# Patient Record
Sex: MALE | Race: WHITE | NOT HISPANIC OR LATINO | Employment: OTHER | URBAN - METROPOLITAN AREA
[De-identification: names, ages, dates, MRNs, and addresses within clinical notes are randomized per-mention and may not be internally consistent; named-entity substitution may affect disease eponyms.]

---

## 2017-01-11 ENCOUNTER — AMBULATORY - HEALTHEAST (OUTPATIENT)
Dept: CARDIOLOGY | Facility: CLINIC | Age: 57
End: 2017-01-11

## 2017-01-11 ENCOUNTER — OFFICE VISIT - HEALTHEAST (OUTPATIENT)
Dept: CARDIOLOGY | Facility: CLINIC | Age: 57
End: 2017-01-11

## 2017-01-11 DIAGNOSIS — I10 ESSENTIAL HYPERTENSION WITH GOAL BLOOD PRESSURE LESS THAN 130/80: ICD-10-CM

## 2017-01-11 DIAGNOSIS — I51.7 LEFT VENTRICULAR HYPERTROPHY: ICD-10-CM

## 2017-01-11 DIAGNOSIS — I71.21 ASCENDING AORTIC ANEURYSM (H): ICD-10-CM

## 2017-01-11 DIAGNOSIS — I51.7 ASYMMETRIC SEPTAL HYPERTROPHY: ICD-10-CM

## 2017-01-11 ASSESSMENT — MIFFLIN-ST. JEOR: SCORE: 1453.1

## 2017-02-24 ENCOUNTER — COMMUNICATION - HEALTHEAST (OUTPATIENT)
Dept: CARDIOLOGY | Facility: CLINIC | Age: 57
End: 2017-02-24

## 2017-02-24 DIAGNOSIS — I25.10 CAD (CORONARY ARTERY DISEASE): ICD-10-CM

## 2018-01-09 ENCOUNTER — COMMUNICATION - HEALTHEAST (OUTPATIENT)
Dept: CARDIOLOGY | Facility: CLINIC | Age: 58
End: 2018-01-09

## 2018-01-09 DIAGNOSIS — I25.10 CAD (CORONARY ARTERY DISEASE): ICD-10-CM

## 2018-01-10 ENCOUNTER — COMMUNICATION - HEALTHEAST (OUTPATIENT)
Dept: ADMINISTRATIVE | Facility: CLINIC | Age: 58
End: 2018-01-10

## 2018-01-26 ENCOUNTER — COMMUNICATION - HEALTHEAST (OUTPATIENT)
Dept: CARDIOLOGY | Facility: CLINIC | Age: 58
End: 2018-01-26

## 2018-01-26 DIAGNOSIS — R07.9 CHEST PAIN: ICD-10-CM

## 2018-02-09 ENCOUNTER — COMMUNICATION - HEALTHEAST (OUTPATIENT)
Dept: TELEHEALTH | Facility: CLINIC | Age: 58
End: 2018-02-09

## 2018-02-09 ENCOUNTER — HOSPITAL ENCOUNTER (OUTPATIENT)
Dept: CARDIOLOGY | Facility: HOSPITAL | Age: 58
Discharge: HOME | End: 2018-02-09
Attending: INTERNAL MEDICINE

## 2018-02-09 DIAGNOSIS — R07.9 CHEST PAIN: ICD-10-CM

## 2018-02-24 ENCOUNTER — COMMUNICATION - HEALTHEAST (OUTPATIENT)
Dept: CARDIOLOGY | Facility: CLINIC | Age: 58
End: 2018-02-24

## 2018-02-24 DIAGNOSIS — I25.10 CAD (CORONARY ARTERY DISEASE): ICD-10-CM

## 2018-02-27 ENCOUNTER — COMMUNICATION - HEALTHEAST (OUTPATIENT)
Dept: CARDIOLOGY | Facility: CLINIC | Age: 58
End: 2018-02-27

## 2018-02-27 DIAGNOSIS — I25.10 CAD (CORONARY ARTERY DISEASE): ICD-10-CM

## 2018-03-31 ENCOUNTER — COMMUNICATION - HEALTHEAST (OUTPATIENT)
Dept: CARDIOLOGY | Facility: CLINIC | Age: 58
End: 2018-03-31

## 2018-03-31 DIAGNOSIS — I25.10 CAD (CORONARY ARTERY DISEASE): ICD-10-CM

## 2018-04-03 ENCOUNTER — RECORDS - HEALTHEAST (OUTPATIENT)
Dept: ADMINISTRATIVE | Facility: OTHER | Age: 58
End: 2018-04-03

## 2018-06-12 ENCOUNTER — RECORDS - HEALTHEAST (OUTPATIENT)
Dept: ADMINISTRATIVE | Facility: OTHER | Age: 58
End: 2018-06-12

## 2018-08-22 ENCOUNTER — AMBULATORY - HEALTHEAST (OUTPATIENT)
Dept: PULMONOLOGY | Facility: OTHER | Age: 58
End: 2018-08-22

## 2018-08-22 ENCOUNTER — COMMUNICATION - HEALTHEAST (OUTPATIENT)
Dept: PULMONOLOGY | Facility: OTHER | Age: 58
End: 2018-08-22

## 2018-08-22 DIAGNOSIS — R06.02 SOB (SHORTNESS OF BREATH): ICD-10-CM

## 2018-09-12 ENCOUNTER — HOSPITAL ENCOUNTER (OUTPATIENT)
Dept: RESPIRATORY THERAPY | Facility: HOSPITAL | Age: 58
Discharge: HOME | End: 2018-09-12

## 2018-09-12 DIAGNOSIS — R06.02 SOB (SHORTNESS OF BREATH): ICD-10-CM

## 2018-09-12 LAB — HGB BLD-MCNC: 14.3 G/DL (ref 14–18)

## 2018-09-17 ENCOUNTER — OFFICE VISIT - HEALTHEAST (OUTPATIENT)
Dept: PULMONOLOGY | Facility: OTHER | Age: 58
End: 2018-09-17

## 2018-09-17 DIAGNOSIS — R07.89 ATYPICAL CHEST PAIN: ICD-10-CM

## 2018-09-17 ASSESSMENT — MIFFLIN-ST. JEOR: SCORE: 1403.2

## 2018-09-28 ENCOUNTER — COMMUNICATION - HEALTHEAST (OUTPATIENT)
Dept: INTENSIVE CARE | Facility: CLINIC | Age: 58
End: 2018-09-28

## 2019-01-25 ENCOUNTER — AMBULATORY - HEALTHEAST (OUTPATIENT)
Dept: PULMONOLOGY | Facility: OTHER | Age: 59
End: 2019-01-25

## 2019-01-28 ENCOUNTER — AMBULATORY - HEALTHEAST (OUTPATIENT)
Dept: PULMONOLOGY | Facility: OTHER | Age: 59
End: 2019-01-28

## 2019-01-28 DIAGNOSIS — R09.1 PLEURISY: ICD-10-CM

## 2019-02-12 ENCOUNTER — HOSPITAL ENCOUNTER (OUTPATIENT)
Dept: CT IMAGING | Facility: HOSPITAL | Age: 59
Discharge: HOME | End: 2019-02-12
Attending: INTERNAL MEDICINE

## 2019-02-12 DIAGNOSIS — R09.1 PLEURISY: ICD-10-CM

## 2019-02-15 ENCOUNTER — COMMUNICATION - HEALTHEAST (OUTPATIENT)
Dept: PULMONOLOGY | Facility: OTHER | Age: 59
End: 2019-02-15

## 2019-02-18 ENCOUNTER — COMMUNICATION - HEALTHEAST (OUTPATIENT)
Dept: PULMONOLOGY | Facility: OTHER | Age: 59
End: 2019-02-18

## 2019-11-01 ENCOUNTER — RECORDS - HEALTHEAST (OUTPATIENT)
Dept: ADMINISTRATIVE | Facility: OTHER | Age: 59
End: 2019-11-01

## 2019-11-04 ENCOUNTER — OFFICE VISIT - HEALTHEAST (OUTPATIENT)
Dept: CARDIOLOGY | Facility: CLINIC | Age: 59
End: 2019-11-04

## 2019-11-04 DIAGNOSIS — I71.21 ASCENDING AORTIC ANEURYSM (H): ICD-10-CM

## 2019-11-04 DIAGNOSIS — R07.2 PRECORDIAL PAIN: ICD-10-CM

## 2019-11-04 ASSESSMENT — MIFFLIN-ST. JEOR: SCORE: 1416.81

## 2019-12-01 ENCOUNTER — APPOINTMENT (OUTPATIENT)
Dept: GENERAL RADIOLOGY | Facility: CLINIC | Age: 59
End: 2019-12-01
Attending: EMERGENCY MEDICINE
Payer: COMMERCIAL

## 2019-12-01 ENCOUNTER — HOSPITAL ENCOUNTER (EMERGENCY)
Facility: CLINIC | Age: 59
Discharge: HOME | End: 2019-12-01
Attending: EMERGENCY MEDICINE | Admitting: EMERGENCY MEDICINE
Payer: COMMERCIAL

## 2019-12-01 VITALS
SYSTOLIC BLOOD PRESSURE: 158 MMHG | RESPIRATION RATE: 16 BRPM | TEMPERATURE: 98.2 F | DIASTOLIC BLOOD PRESSURE: 93 MMHG | WEIGHT: 145 LBS | HEART RATE: 90 BPM | OXYGEN SATURATION: 98 %

## 2019-12-01 DIAGNOSIS — H10.31 ACUTE CONJUNCTIVITIS OF RIGHT EYE, UNSPECIFIED ACUTE CONJUNCTIVITIS TYPE: ICD-10-CM

## 2019-12-01 DIAGNOSIS — J18.9 COMMUNITY ACQUIRED PNEUMONIA, UNSPECIFIED LATERALITY: ICD-10-CM

## 2019-12-01 LAB
ALBUMIN SERPL-MCNC: 3.2 G/DL (ref 3.4–5)
ALP SERPL-CCNC: 130 U/L (ref 40–150)
ALT SERPL W P-5'-P-CCNC: 32 U/L (ref 0–70)
ANION GAP SERPL CALCULATED.3IONS-SCNC: 4 MMOL/L (ref 3–14)
AST SERPL W P-5'-P-CCNC: 24 U/L (ref 0–45)
BASOPHILS # BLD AUTO: 0.1 10E9/L (ref 0–0.2)
BASOPHILS NFR BLD AUTO: 0.4 %
BILIRUB SERPL-MCNC: 0.6 MG/DL (ref 0.2–1.3)
BUN SERPL-MCNC: 19 MG/DL (ref 7–30)
CALCIUM SERPL-MCNC: 9.3 MG/DL (ref 8.5–10.1)
CHLORIDE SERPL-SCNC: 99 MMOL/L (ref 94–109)
CO2 SERPL-SCNC: 29 MMOL/L (ref 20–32)
CREAT SERPL-MCNC: 1.15 MG/DL (ref 0.66–1.25)
DEPRECATED S PYO AG THROAT QL EIA: NORMAL
DIFFERENTIAL METHOD BLD: ABNORMAL
EOSINOPHIL # BLD AUTO: 0.1 10E9/L (ref 0–0.7)
EOSINOPHIL NFR BLD AUTO: 0.7 %
ERYTHROCYTE [DISTWIDTH] IN BLOOD BY AUTOMATED COUNT: 11.7 % (ref 10–15)
FLUAV+FLUBV AG SPEC QL: NEGATIVE
FLUAV+FLUBV AG SPEC QL: NEGATIVE
GFR SERPL CREATININE-BSD FRML MDRD: 69 ML/MIN/{1.73_M2}
GLUCOSE SERPL-MCNC: 88 MG/DL (ref 70–99)
HCT VFR BLD AUTO: 40.4 % (ref 40–53)
HGB BLD-MCNC: 13 G/DL (ref 13.3–17.7)
IMM GRANULOCYTES # BLD: 0 10E9/L (ref 0–0.4)
IMM GRANULOCYTES NFR BLD: 0.3 %
LYMPHOCYTES # BLD AUTO: 1.4 10E9/L (ref 0.8–5.3)
LYMPHOCYTES NFR BLD AUTO: 11.6 %
MCH RBC QN AUTO: 30 PG (ref 26.5–33)
MCHC RBC AUTO-ENTMCNC: 32.2 G/DL (ref 31.5–36.5)
MCV RBC AUTO: 93 FL (ref 78–100)
MONOCYTES # BLD AUTO: 1.3 10E9/L (ref 0–1.3)
MONOCYTES NFR BLD AUTO: 11.4 %
NEUTROPHILS # BLD AUTO: 8.9 10E9/L (ref 1.6–8.3)
NEUTROPHILS NFR BLD AUTO: 75.6 %
NRBC # BLD AUTO: 0 10*3/UL
NRBC BLD AUTO-RTO: 0 /100
NT-PROBNP SERPL-MCNC: 158 PG/ML (ref 0–900)
PLATELET # BLD AUTO: 323 10E9/L (ref 150–450)
POTASSIUM SERPL-SCNC: 3.8 MMOL/L (ref 3.4–5.3)
PROT SERPL-MCNC: 8 G/DL (ref 6.8–8.8)
RBC # BLD AUTO: 4.34 10E12/L (ref 4.4–5.9)
SODIUM SERPL-SCNC: 132 MMOL/L (ref 133–144)
SPECIMEN SOURCE: NORMAL
SPECIMEN SOURCE: NORMAL
WBC # BLD AUTO: 11.8 10E9/L (ref 4–11)

## 2019-12-01 PROCEDURE — 99284 EMERGENCY DEPT VISIT MOD MDM: CPT | Mod: Z6 | Performed by: EMERGENCY MEDICINE

## 2019-12-01 PROCEDURE — 80053 COMPREHEN METABOLIC PANEL: CPT | Performed by: EMERGENCY MEDICINE

## 2019-12-01 PROCEDURE — 83880 ASSAY OF NATRIURETIC PEPTIDE: CPT | Performed by: EMERGENCY MEDICINE

## 2019-12-01 PROCEDURE — 87081 CULTURE SCREEN ONLY: CPT | Performed by: EMERGENCY MEDICINE

## 2019-12-01 PROCEDURE — 85025 COMPLETE CBC W/AUTO DIFF WBC: CPT | Performed by: EMERGENCY MEDICINE

## 2019-12-01 PROCEDURE — 87804 INFLUENZA ASSAY W/OPTIC: CPT | Mod: 59 | Performed by: EMERGENCY MEDICINE

## 2019-12-01 PROCEDURE — 99284 EMERGENCY DEPT VISIT MOD MDM: CPT | Mod: 25 | Performed by: EMERGENCY MEDICINE

## 2019-12-01 PROCEDURE — 25800030 ZZH RX IP 258 OP 636: Performed by: EMERGENCY MEDICINE

## 2019-12-01 PROCEDURE — 71046 X-RAY EXAM CHEST 2 VIEWS: CPT

## 2019-12-01 PROCEDURE — 87880 STREP A ASSAY W/OPTIC: CPT | Performed by: EMERGENCY MEDICINE

## 2019-12-01 RX ORDER — LEVOFLOXACIN 500 MG/1
500 TABLET, FILM COATED ORAL DAILY
Qty: 10 TABLET | Refills: 0 | Status: SHIPPED | OUTPATIENT
Start: 2019-12-01 | End: 2019-12-11

## 2019-12-01 RX ORDER — GENTAMICIN SULFATE 3 MG/ML
1-2 SOLUTION/ DROPS OPHTHALMIC 4 TIMES DAILY PRN
Qty: 5 ML | Refills: 0 | Status: SHIPPED | OUTPATIENT
Start: 2019-12-01 | End: 2019-12-08

## 2019-12-01 RX ADMIN — SODIUM CHLORIDE 1000 ML: 9 INJECTION, SOLUTION INTRAVENOUS at 14:54

## 2019-12-01 NOTE — ED PROVIDER NOTES
History     Chief Complaint   Patient presents with     Cough     bodyaches, right side chest pain     Abdominal Pain     HPI  Kiko Booker is a 59 year old male who with several days of URI symptoms with nonproductive cough, nasal congestion and sore throat who has developed right eye redness, mattering and drainage, right-sided chest pain, fatigue and malaise.  No fever. No recent travel or known infectious exposures.  No hemoptysis, leg pain or leg swelling.  He does not wear contact lenses and he has no eye pain, photophobia or visual deficit.  No history of glaucoma or eye disease.  He does not receive influenza vaccinations.  He is also recently been constipated and has had intermittent mild abdominal cramping.  No nausea or vomiting.  No signs or symptoms of GI bleeding.  He started using OTC medications for constipation, but he has in the past for intermittent episodes of constipation.    Allergies:  No Known Allergies    Problem List:    Patient Active Problem List    Diagnosis Date Noted     Adjustment disorder with mixed anxiety and depressed mood 02/28/2014     Priority: Medium     Neck pain 04/24/2013     Priority: Medium     Headache 04/24/2013     Priority: Medium     Problem list name updated by automated process. Provider to review and confirm  Problem list name updated by automated process. Provider to review          Past Medical History:    No past medical history on file.    Past Surgical History:    No past surgical history on file.    Family History:    No family history on file.    Social History:  Marital Status:   [5]  Social History     Tobacco Use     Smoking status: No   Substance Use Topics     Alcohol use: Not on file     Drug use: No        Medications:    gentamicin (GARAMYCIN) 0.3 % ophthalmic solution  levofloxacin (LEVAQUIN) 500 MG tablet        Review of Systems  As mentioned above in the history present illness.  All other systems were reviewed and are  negative.    Physical Exam   BP: (!) 160/87  Pulse: 90  Heart Rate: 94  Temp: 98.2  F (36.8  C)  Resp: 16  Weight: 65.8 kg (145 lb)  SpO2: 97 %      Physical Exam  Vitals signs and nursing note reviewed.   Constitutional:       General: He is not in acute distress.     Appearance: Normal appearance. He is well-developed. He is not ill-appearing or diaphoretic.   HENT:      Head: Normocephalic and atraumatic.      Right Ear: External ear normal.      Left Ear: External ear normal.      Nose: Nose normal.      Mouth/Throat:      Mouth: Mucous membranes are moist.   Eyes:      General: No scleral icterus.     Extraocular Movements: Extraocular movements intact.      Conjunctiva/sclera: Conjunctivae normal.   Neck:      Musculoskeletal: Normal range of motion and neck supple.      Trachea: No tracheal deviation.   Cardiovascular:      Rate and Rhythm: Normal rate and regular rhythm.      Heart sounds: Normal heart sounds. No murmur. No friction rub. No gallop.    Pulmonary:      Effort: Pulmonary effort is normal. No tachypnea, accessory muscle usage, prolonged expiration or respiratory distress.      Breath sounds: No stridor or decreased air movement. Examination of the right-lower field reveals rhonchi and rales. Examination of the left-lower field reveals rhonchi and rales. Rhonchi and rales present. No wheezing.   Abdominal:      General: Bowel sounds are normal. There is no distension.      Palpations: Abdomen is soft.      Tenderness: There is no abdominal tenderness.   Musculoskeletal: Normal range of motion.         General: No tenderness.      Right lower leg: No edema.      Left lower leg: No edema.   Skin:     General: Skin is warm and dry.      Coloration: Skin is not pale.      Findings: No erythema or rash.   Neurological:      General: No focal deficit present.      Mental Status: He is alert and oriented to person, place, and time.      Coordination: Coordination normal.   Psychiatric:         Mood and  Affect: Mood normal.         Behavior: Behavior normal.         ED Course        Procedures              Results for orders placed or performed during the hospital encounter of 12/01/19   CBC with platelets differential     Status: Abnormal   Result Value Ref Range    WBC 11.8 (H) 4.0 - 11.0 10e9/L    RBC Count 4.34 (L) 4.4 - 5.9 10e12/L    Hemoglobin 13.0 (L) 13.3 - 17.7 g/dL    Hematocrit 40.4 40.0 - 53.0 %    MCV 93 78 - 100 fl    MCH 30.0 26.5 - 33.0 pg    MCHC 32.2 31.5 - 36.5 g/dL    RDW 11.7 10.0 - 15.0 %    Platelet Count 323 150 - 450 10e9/L    Diff Method Automated Method     % Neutrophils 75.6 %    % Lymphocytes 11.6 %    % Monocytes 11.4 %    % Eosinophils 0.7 %    % Basophils 0.4 %    % Immature Granulocytes 0.3 %    Nucleated RBCs 0 0 /100    Absolute Neutrophil 8.9 (H) 1.6 - 8.3 10e9/L    Absolute Lymphocytes 1.4 0.8 - 5.3 10e9/L    Absolute Monocytes 1.3 0.0 - 1.3 10e9/L    Absolute Eosinophils 0.1 0.0 - 0.7 10e9/L    Absolute Basophils 0.1 0.0 - 0.2 10e9/L    Abs Immature Granulocytes 0.0 0 - 0.4 10e9/L    Absolute Nucleated RBC 0.0    Comprehensive metabolic panel     Status: Abnormal   Result Value Ref Range    Sodium 132 (L) 133 - 144 mmol/L    Potassium 3.8 3.4 - 5.3 mmol/L    Chloride 99 94 - 109 mmol/L    Carbon Dioxide 29 20 - 32 mmol/L    Anion Gap 4 3 - 14 mmol/L    Glucose 88 70 - 99 mg/dL    Urea Nitrogen 19 7 - 30 mg/dL    Creatinine 1.15 0.66 - 1.25 mg/dL    GFR Estimate 69 >60 mL/min/[1.73_m2]    GFR Estimate If Black 80 >60 mL/min/[1.73_m2]    Calcium 9.3 8.5 - 10.1 mg/dL    Bilirubin Total 0.6 0.2 - 1.3 mg/dL    Albumin 3.2 (L) 3.4 - 5.0 g/dL    Protein Total 8.0 6.8 - 8.8 g/dL    Alkaline Phosphatase 130 40 - 150 U/L    ALT 32 0 - 70 U/L    AST 24 0 - 45 U/L   Nt probnp inpatient     Status: None   Result Value Ref Range    N-Terminal Pro BNP Inpatient 158 0 - 900 pg/mL   Rapid strep screen     Status: None   Result Value Ref Range    Specimen Description Throat     Rapid Strep A  Screen       NEGATIVE: No Group A streptococcal antigen detected by immunoassay, await culture report.   Influenza A/B antigen     Status: None   Result Value Ref Range    Influenza A/B Agn Specimen Nasopharyngeal     Influenza A Negative NEG^Negative    Influenza B Negative NEG^Negative   Beta strep group A culture     Status: None   Result Value Ref Range    Specimen Description Throat     Special Requests Specimen collected in eSwab transport (white cap)     Culture Micro No Beta Streptococcus isolated        CHEST TWO VIEW   12/1/2019 3:11 PM      HISTORY: Cough and dyspnea.     COMPARISON: None.                                                                IMPRESSION: Interstitial opacities seen in the bilateral lower lungs,  likely representing subsegmental atelectasis. Atypical infection would  be less likely but remains in the differential diagnosis, correlate  with clinical symptoms. Tortuous descending thoracic aorta. Enlarged  cardiomediastinal silhouette. Multilevel degenerative changes in the spine.            I independently reviewed the X-rays: Agree with the Radiologist's interpretation, bibasilar infiltrates.    Medications   0.9% sodium chloride BOLUS (0 mLs Intravenous Stopped 12/1/19 5760)       Assessments & Plan (with Medical Decision Making)   59-year-old male with uncomplicated community-acquired pneumonia without respiratory distress or hypoxia.  He appears stable for discharge home with Rx Levaquin for 10 days and gentamicin ophthalmic drops for right conjunctivitis.  He was provided instructions for supportive care and will return as needed for worsened condition or worsening symptoms, or new problems or concerns.  Recommended recheck in his primary care clinic in the near future and provided him a copy of his chest x-ray on disc to facilitate his outpatient follow-up.      I have reviewed the nursing notes.    I have reviewed the findings, diagnosis, plan and need for follow up with the  patient.    Discharge Medication List as of 12/1/2019  3:44 PM      START taking these medications    Details   gentamicin (GARAMYCIN) 0.3 % ophthalmic solution Place 1-2 drops into the right eye 4 times daily as needed (for 5-7 days as needed until symptoms resolve), Disp-5 mL, R-0, E-PrescribePlease dispense 7-day supply      levofloxacin (LEVAQUIN) 500 MG tablet Take 1 tablet (500 mg) by mouth daily for 10 days, Disp-10 tablet, R-0, E-Prescribe             Final diagnoses:   Community acquired pneumonia, unspecified laterality   Acute conjunctivitis of right eye, unspecified acute conjunctivitis type       12/1/2019   [facility] EMERGENCY DEPARTMENT     Jaciel Keene MD  12/05/19 5595

## 2019-12-01 NOTE — ED NOTES
IV discontinued, Pt d/c instructions reviewed and received. There are no unanswered questions at the time of discharge. Pt escorted to lobby for discharge.

## 2019-12-01 NOTE — ED NOTES
"Pt comes in with wife for urgent care. Started to not feel Weds. Developed sore throat on Thursday, had ear pain at one point, but not currently. Today has \" goopy\" right eye. Denies vision changes. Also notes abd discomfort but does not recall last BM and states \" could be due to that\" Has had cough, nonproductive, but worse with talking. Did not get influenza vaccine. Pt in room, oriented to room and call light. Call light within reach.  "

## 2019-12-01 NOTE — ED AVS SNAPSHOT
[facility] Emergency Department  [address] [address]  Phone:  [phone]  Fax:  [phone]                                    Kiko Booker   MRN: [id number]    Department:  [facility] Emergency Department   Date of Visit:  12/1/2019           After Visit Summary Signature Page    I have received my discharge instructions, and my questions have been answered. I have discussed any challenges I see with this plan with the nurse or doctor.    ..........................................................................................................................................  Patient/Patient Representative Signature      ..........................................................................................................................................  Patient Representative Print Name and Relationship to Patient    ..................................................               ................................................  Date                                   Time    ..........................................................................................................................................  Reviewed by Signature/Title    ...................................................              ..............................................  Date                                               Time          22EPIC Rev 08/18

## 2019-12-03 LAB
BACTERIA SPEC CULT: NORMAL
Lab: NORMAL
SPECIMEN SOURCE: NORMAL

## 2019-12-06 ENCOUNTER — RECORDS - HEALTHEAST (OUTPATIENT)
Dept: ADMINISTRATIVE | Facility: OTHER | Age: 59
End: 2019-12-06

## 2019-12-28 ENCOUNTER — COMMUNICATION - HEALTHEAST (OUTPATIENT)
Dept: FAMILY MEDICINE | Facility: CLINIC | Age: 59
End: 2019-12-28

## 2019-12-30 ENCOUNTER — COMMUNICATION - HEALTHEAST (OUTPATIENT)
Dept: CARDIOLOGY | Facility: CLINIC | Age: 59
End: 2019-12-30

## 2020-01-08 ENCOUNTER — HOSPITAL ENCOUNTER (OUTPATIENT)
Dept: CARDIOLOGY | Facility: HOSPITAL | Age: 60
Discharge: HOME | End: 2020-01-08

## 2020-01-08 DIAGNOSIS — I51.7 ENLARGED CARDIAC VENTRICLE: ICD-10-CM

## 2020-01-08 LAB
AORTIC ROOT: 4 CM
AORTIC VALVE MEAN VELOCITY: 132 CM/S
AV CUSP SEPERATION: 1.9 CM
AV CUSP SEPERATION: 1.9 CM
AV DIMENSIONLESS INDEX VTI: 0.8
AV MEAN GRADIENT: 7 MMHG
AV PEAK GRADIENT: 10.4 MMHG
AV VALVE AREA: 3.2 CM2
BSA FOR ECHO PROCEDURE: 1.77 M2
CV ECHO HEIGHT: 65 IN
CV ECHO WEIGHT: 151 LBS
DOP CALC AO PEAK VEL: 161 CM/S
DOP CALC AO VTI: 35.8 CM
DOP CALC LVOT AREA: 4.15 CM2
DOP CALC LVOT DIAMETER: 2.3 CM
DOP CALC LVOT STROKE VOLUME: 114.2 CM3
DOP CALC MV VTI: 21.4 CM
DOP CALCLVOT PEAK VEL VTI: 27.5 CM
EJECTION FRACTION: 73 % (ref 55–75)
FRACTIONAL SHORTENING: 25 % (ref 28–44)
INTERVENTRICULAR SEPTUM IN END DIASTOLE: 1.4 CM (ref 0.6–1)
IVS/PW RATIO: 1.2
LA AREA 1: 18.4 CM2
LA AREA 2: 21.7 CM2
LEFT ATRIUM LENGTH: 4.46 CM
LEFT ATRIUM SIZE: 3.2 CM
LEFT ATRIUM VOLUME INDEX: 43 ML/M2
LEFT ATRIUM VOLUME: 76.1 ML
LEFT VENTRICLE CARDIAC INDEX: 5.3 L/MIN/M2
LEFT VENTRICLE CARDIAC OUTPUT: 9.4 L/MIN
LEFT VENTRICLE DIASTOLIC VOLUME INDEX: 35 CM3/M2 (ref 34–74)
LEFT VENTRICLE DIASTOLIC VOLUME: 62 CM3 (ref 62–150)
LEFT VENTRICLE HEART RATE: 82 BPM
LEFT VENTRICLE MASS INDEX: 90.4 G/M2
LEFT VENTRICLE SYSTOLIC VOLUME INDEX: 9.6 CM3/M2 (ref 11–31)
LEFT VENTRICLE SYSTOLIC VOLUME: 17 CM3 (ref 21–61)
LEFT VENTRICULAR INTERNAL DIMENSION IN DIASTOLE: 3.6 CM (ref 4.2–5.8)
LEFT VENTRICULAR INTERNAL DIMENSION IN SYSTOLE: 2.7 CM (ref 2.5–4)
LEFT VENTRICULAR MASS: 160.1 G
LEFT VENTRICULAR OUTFLOW TRACT MEAN GRADIENT: 5 MMHG
LEFT VENTRICULAR OUTFLOW TRACT MEAN VELOCITY: 109 CM/S
LEFT VENTRICULAR POSTERIOR WALL IN END DIASTOLE: 1.2 CM (ref 0.6–1)
LV STROKE VOLUME INDEX: 64.5 ML/M2
MITRAL VALVE DECELERATION SLOPE: 6980 MM/S2
MITRAL VALVE E/A RATIO: 0.8
MITRAL VALVE MEAN INFLOW VELOCITY: 56.1 CM/S
MITRAL VALVE PEAK VELOCITY: 102 CM/S
MITRAL VALVE PRESSURE HALF-TIME: 43 MS
MV AREA VTI: 5.34 CM2
MV AVERAGE E/E' RATIO: 9.9 CM/S
MV DECELERATION TIME: 169 MS
MV E'TISSUE VEL-LAT: 7.8 CM/S
MV E'TISSUE VEL-MED: 6.34 CM/S
MV LATERAL E/E' RATIO: 9
MV MEAN GRADIENT: 2 MMHG
MV MEDIAL E/E' RATIO: 11.1
MV PEAK A VELOCITY: 93.3 CM/S
MV PEAK E VELOCITY: 70.1 CM/S
MV PEAK GRADIENT: 4.2 MMHG
MV VALVE AREA BY CONTINUITY EQUATION: 5.3 CM2
MV VALVE AREA PRESSURE 1/2 METHOD: 5.1 CM2
NUC REST DIASTOLIC VOLUME INDEX: 2416 LBS
NUC REST SYSTOLIC VOLUME INDEX: 65 IN
TRICUSPID VALVE ANULAR PLANE SYSTOLIC EXCURSION: 2.9 CM

## 2020-01-08 ASSESSMENT — MIFFLIN-ST. JEOR: SCORE: 1416.81

## 2020-01-27 ENCOUNTER — COMMUNICATION - HEALTHEAST (OUTPATIENT)
Dept: CARDIOLOGY | Facility: CLINIC | Age: 60
End: 2020-01-27

## 2020-02-03 ENCOUNTER — RECORDS - HEALTHEAST (OUTPATIENT)
Dept: ADMINISTRATIVE | Facility: OTHER | Age: 60
End: 2020-02-03

## 2020-02-03 ENCOUNTER — AMBULATORY - HEALTHEAST (OUTPATIENT)
Dept: CARDIOLOGY | Facility: CLINIC | Age: 60
End: 2020-02-03

## 2020-02-10 ENCOUNTER — RECORDS - HEALTHEAST (OUTPATIENT)
Dept: RADIOLOGY | Facility: CLINIC | Age: 60
End: 2020-02-10

## 2020-02-11 ENCOUNTER — OFFICE VISIT - HEALTHEAST (OUTPATIENT)
Dept: CARDIOLOGY | Facility: CLINIC | Age: 60
End: 2020-02-11

## 2020-02-11 DIAGNOSIS — R07.9 NONSPECIFIC CHEST PAIN: ICD-10-CM

## 2020-02-11 DIAGNOSIS — I71.21 ASCENDING AORTIC ANEURYSM (H): ICD-10-CM

## 2020-02-11 LAB
ATRIAL RATE - MUSE: 72 BPM
DIASTOLIC BLOOD PRESSURE - MUSE: NORMAL
INTERPRETATION ECG - MUSE: NORMAL
P AXIS - MUSE: 35 DEGREES
PR INTERVAL - MUSE: 162 MS
QRS DURATION - MUSE: 86 MS
QT - MUSE: 370 MS
QTC - MUSE: 405 MS
R AXIS - MUSE: -19 DEGREES
SYSTOLIC BLOOD PRESSURE - MUSE: NORMAL
T AXIS - MUSE: 44 DEGREES
VENTRICULAR RATE- MUSE: 72 BPM

## 2020-02-11 ASSESSMENT — MIFFLIN-ST. JEOR: SCORE: 1421.35

## 2020-02-12 ENCOUNTER — AMBULATORY - HEALTHEAST (OUTPATIENT)
Dept: CARDIOLOGY | Facility: CLINIC | Age: 60
End: 2020-02-12

## 2020-02-12 ENCOUNTER — HOSPITAL ENCOUNTER (OUTPATIENT)
Dept: CARDIOLOGY | Facility: HOSPITAL | Age: 60
Discharge: HOME | End: 2020-02-12
Attending: INTERNAL MEDICINE

## 2020-02-12 DIAGNOSIS — I10 ESSENTIAL HYPERTENSION: ICD-10-CM

## 2020-02-12 LAB
CV STRESS CURRENT BP HE: NORMAL
CV STRESS CURRENT HR HE: 102
CV STRESS CURRENT HR HE: 103
CV STRESS CURRENT HR HE: 104
CV STRESS CURRENT HR HE: 111
CV STRESS CURRENT HR HE: 113
CV STRESS CURRENT HR HE: 113
CV STRESS CURRENT HR HE: 115
CV STRESS CURRENT HR HE: 117
CV STRESS CURRENT HR HE: 118
CV STRESS CURRENT HR HE: 122
CV STRESS CURRENT HR HE: 126
CV STRESS CURRENT HR HE: 128
CV STRESS CURRENT HR HE: 129
CV STRESS CURRENT HR HE: 132
CV STRESS CURRENT HR HE: 133
CV STRESS CURRENT HR HE: 135
CV STRESS CURRENT HR HE: 137
CV STRESS CURRENT HR HE: 139
CV STRESS CURRENT HR HE: 140
CV STRESS CURRENT HR HE: 141
CV STRESS CURRENT HR HE: 143
CV STRESS CURRENT HR HE: 144
CV STRESS CURRENT HR HE: 144
CV STRESS CURRENT HR HE: 145
CV STRESS CURRENT HR HE: 84
CV STRESS CURRENT HR HE: 86
CV STRESS CURRENT HR HE: 90
CV STRESS CURRENT HR HE: 90
CV STRESS CURRENT HR HE: 91
CV STRESS CURRENT HR HE: 91
CV STRESS CURRENT HR HE: 95
CV STRESS CURRENT HR HE: 96
CV STRESS CURRENT HR HE: 98
CV STRESS DEVIATION TIME HE: NORMAL
CV STRESS ECHO PERCENT HR HE: NORMAL
CV STRESS EXERCISE STAGE HE: NORMAL
CV STRESS FINAL RESTING BP HE: NORMAL
CV STRESS FINAL RESTING HR HE: 91
CV STRESS MAX HR HE: 145
CV STRESS MAX TREADMILL GRADE HE: 14
CV STRESS MAX TREADMILL SPEED HE: 3.4
CV STRESS PEAK DIA BP HE: NORMAL
CV STRESS PEAK SYS BP HE: NORMAL
CV STRESS PHASE HE: NORMAL
CV STRESS PROTOCOL HE: NORMAL
CV STRESS RESTING PT POSITION HE: NORMAL
CV STRESS RESTING PT POSITION HE: NORMAL
CV STRESS ST DEVIATION AMOUNT HE: NORMAL
CV STRESS ST DEVIATION ELEVATION HE: NORMAL
CV STRESS ST EVELATION AMOUNT HE: NORMAL
CV STRESS TEST TYPE HE: NORMAL
CV STRESS TOTAL STAGE TIME MIN 1 HE: NORMAL
ECHO EJECTION FRACTION ESTIMATED: 65 %
RATE PRESSURE PRODUCT: NORMAL
STRESS ECHO BASELINE DIASTOLIC HE: 94
STRESS ECHO BASELINE HR: 86
STRESS ECHO BASELINE SYSTOLIC BP: 172
STRESS ECHO CALCULATED PERCENT HR: 90 %
STRESS ECHO LAST STRESS DIASTOLIC BP: 88
STRESS ECHO LAST STRESS HR: 144
STRESS ECHO LAST STRESS SYSTOLIC BP: 214
STRESS ECHO POST ESTIMATED WORKLOAD: 10.3
STRESS ECHO POST EXERCISE DUR MIN: 8
STRESS ECHO POST EXERCISE DUR SEC: 58
STRESS ECHO TARGET HR: 161

## 2020-02-17 ENCOUNTER — OFFICE VISIT - HEALTHEAST (OUTPATIENT)
Dept: PULMONOLOGY | Facility: OTHER | Age: 60
End: 2020-02-17

## 2020-02-17 ENCOUNTER — RECORDS - HEALTHEAST (OUTPATIENT)
Dept: RADIOLOGY | Facility: CLINIC | Age: 60
End: 2020-02-17

## 2020-02-17 DIAGNOSIS — J18.9 COMMUNITY ACQUIRED PNEUMONIA, UNSPECIFIED LATERALITY: ICD-10-CM

## 2020-02-17 DIAGNOSIS — R93.89 ABNORMAL CT SCAN: ICD-10-CM

## 2020-02-19 ENCOUNTER — COMMUNICATION - HEALTHEAST (OUTPATIENT)
Dept: PULMONOLOGY | Facility: OTHER | Age: 60
End: 2020-02-19

## 2020-07-02 ENCOUNTER — RECORDS - HEALTHEAST (OUTPATIENT)
Dept: ADMINISTRATIVE | Facility: OTHER | Age: 60
End: 2020-07-02

## 2020-07-10 ENCOUNTER — RECORDS - HEALTHEAST (OUTPATIENT)
Dept: LAB | Facility: HOSPITAL | Age: 60
End: 2020-07-10

## 2020-07-10 ENCOUNTER — HOSPITAL ENCOUNTER (OUTPATIENT)
Dept: ULTRASOUND IMAGING | Facility: HOSPITAL | Age: 60
Discharge: HOME | End: 2020-07-10
Attending: INTERNAL MEDICINE

## 2020-07-10 DIAGNOSIS — I10 ESSENTIAL HYPERTENSION: ICD-10-CM

## 2020-07-10 DIAGNOSIS — N18.30 CHRONIC KIDNEY DISEASE, STAGE 3 (MODERATE): ICD-10-CM

## 2020-07-10 DIAGNOSIS — N17.8 OTHER ACUTE KIDNEY FAILURE (H): ICD-10-CM

## 2020-07-10 DIAGNOSIS — I71.21 ANEURYSM OF ASCENDING AORTA (H): ICD-10-CM

## 2020-07-10 DIAGNOSIS — R79.89 SERUM CREATININE RAISED: ICD-10-CM

## 2020-07-10 LAB
25(OH)D3 SERPL-MCNC: 67.4 NG/ML (ref 30–80)
ALBUMIN SERPL-MCNC: 4.3 G/DL (ref 3.5–5)
ALBUMIN SERPL-MCNC: 4.3 G/DL (ref 3.5–5)
ALBUMIN UR-MCNC: NEGATIVE MG/DL
ANION GAP SERPL CALCULATED.3IONS-SCNC: 10 MMOL/L (ref 5–18)
APPEARANCE UR: CLEAR
BILIRUB UR QL STRIP: NEGATIVE
BUN SERPL-MCNC: 24 MG/DL (ref 8–22)
CALCIUM SERPL-MCNC: 9.3 MG/DL (ref 8.5–10.5)
CHLORIDE BLD-SCNC: 103 MMOL/L (ref 98–107)
CO2 SERPL-SCNC: 26 MMOL/L (ref 22–31)
COLOR UR AUTO: COLORLESS
CREAT SERPL-MCNC: 1.67 MG/DL (ref 0.7–1.3)
CREAT UR-MCNC: 15.4 MG/DL
CREAT UR-MCNC: 16.3 MG/DL
GFR SERPL CREATININE-BSD FRML MDRD: 42 ML/MIN/1.73M2
GLUCOSE BLD-MCNC: 94 MG/DL (ref 70–125)
GLUCOSE UR STRIP-MCNC: NEGATIVE MG/DL
HGB BLD-MCNC: 13.5 G/DL (ref 14–18)
HGB UR QL STRIP: NEGATIVE
KETONES UR STRIP-MCNC: NEGATIVE MG/DL
LEUKOCYTE ESTERASE UR QL STRIP: NEGATIVE
MICROALBUMIN UR-MCNC: <0.5 MG/DL (ref 0–1.99)
MICROALBUMIN/CREAT UR: NORMAL MG/G{CREAT}
NITRATE UR QL: NEGATIVE
PH UR STRIP: 6 [PH] (ref 4.5–8)
PHOSPHATE SERPL-MCNC: 3.3 MG/DL (ref 2.5–4.5)
POTASSIUM BLD-SCNC: 4.5 MMOL/L (ref 3.5–5)
PROTEIN, RANDOM URINE - HISTORICAL: <7 MG/DL
PROTEIN/CREAT RATIO, RANDOM UR: NORMAL
PTH-INTACT SERPL-MCNC: 136 PG/ML (ref 10–86)
SODIUM SERPL-SCNC: 139 MMOL/L (ref 136–145)
SP GR UR STRIP: 1 (ref 1–1.03)
UROBILINOGEN UR STRIP-ACNC: NORMAL

## 2021-04-18 ENCOUNTER — HEALTH MAINTENANCE LETTER (OUTPATIENT)
Age: 61
End: 2021-04-18

## 2021-04-23 ENCOUNTER — IMMUNIZATION (OUTPATIENT)
Dept: FAMILY MEDICINE | Facility: CLINIC | Age: 61
End: 2021-04-23
Payer: COMMERCIAL

## 2021-04-23 PROCEDURE — 91301 PR COVID VAC MODERNA 100 MCG/0.5 ML IM: CPT

## 2021-04-23 PROCEDURE — 0011A PR COVID VAC MODERNA 100 MCG/0.5 ML IM: CPT

## 2021-05-14 ENCOUNTER — COMMUNICATION - HEALTHEAST (OUTPATIENT)
Dept: CARDIOLOGY | Facility: CLINIC | Age: 61
End: 2021-05-14

## 2021-05-18 ENCOUNTER — RECORDS - HEALTHEAST (OUTPATIENT)
Dept: ADMINISTRATIVE | Facility: OTHER | Age: 61
End: 2021-05-18

## 2021-05-21 ENCOUNTER — IMMUNIZATION (OUTPATIENT)
Dept: FAMILY MEDICINE | Facility: CLINIC | Age: 61
End: 2021-05-21
Attending: FAMILY MEDICINE
Payer: COMMERCIAL

## 2021-05-21 PROCEDURE — 0012A PR COVID VAC MODERNA 100 MCG/0.5 ML IM: CPT

## 2021-05-21 PROCEDURE — 91301 PR COVID VAC MODERNA 100 MCG/0.5 ML IM: CPT

## 2021-05-28 ENCOUNTER — RECORDS - HEALTHEAST (OUTPATIENT)
Dept: CARDIOLOGY | Facility: CLINIC | Age: 61
End: 2021-05-28

## 2021-05-28 ENCOUNTER — RECORDS - HEALTHEAST (OUTPATIENT)
Dept: ADMINISTRATIVE | Facility: OTHER | Age: 61
End: 2021-05-28

## 2021-05-30 VITALS — BODY MASS INDEX: 26.49 KG/M2 | HEIGHT: 65 IN | WEIGHT: 159 LBS

## 2021-06-01 ENCOUNTER — RECORDS - HEALTHEAST (OUTPATIENT)
Dept: ADMINISTRATIVE | Facility: CLINIC | Age: 61
End: 2021-06-01

## 2021-06-01 VITALS — HEIGHT: 65 IN | WEIGHT: 148 LBS | BODY MASS INDEX: 24.66 KG/M2

## 2021-06-02 ENCOUNTER — OFFICE VISIT - HEALTHEAST (OUTPATIENT)
Dept: CARDIOLOGY | Facility: CLINIC | Age: 61
End: 2021-06-02

## 2021-06-02 DIAGNOSIS — I42.2 HYPERTROPHIC CARDIOMYOPATHY (H): ICD-10-CM

## 2021-06-02 DIAGNOSIS — E78.5 DYSLIPIDEMIA, GOAL LDL BELOW 100: ICD-10-CM

## 2021-06-02 DIAGNOSIS — R07.9 EXERTIONAL CHEST PAIN: ICD-10-CM

## 2021-06-02 DIAGNOSIS — I10 ESSENTIAL HYPERTENSION: ICD-10-CM

## 2021-06-02 DIAGNOSIS — I71.21 ASCENDING AORTIC ANEURYSM (H): ICD-10-CM

## 2021-06-02 ASSESSMENT — MIFFLIN-ST. JEOR: SCORE: 1439.49

## 2021-06-03 VITALS — WEIGHT: 151 LBS | HEIGHT: 65 IN | BODY MASS INDEX: 25.16 KG/M2

## 2021-06-03 VITALS
RESPIRATION RATE: 16 BRPM | HEIGHT: 65 IN | HEART RATE: 80 BPM | SYSTOLIC BLOOD PRESSURE: 146 MMHG | BODY MASS INDEX: 25.16 KG/M2 | DIASTOLIC BLOOD PRESSURE: 82 MMHG | WEIGHT: 151 LBS

## 2021-06-03 NOTE — PATIENT INSTRUCTIONS - HE
Kiko,    It was a pleasure to see you today at [facility].    Please take isordil dinitrate 5 mg by mouth an hour or more before you exercise.     My nurse or I will call you with the stress test results; please don't take the isordil that day.    Please call us if you have any questions or concerns about your heart.      Uriah Dinh M.D.

## 2021-06-04 VITALS
DIASTOLIC BLOOD PRESSURE: 92 MMHG | HEART RATE: 88 BPM | HEIGHT: 65 IN | SYSTOLIC BLOOD PRESSURE: 148 MMHG | RESPIRATION RATE: 16 BRPM | WEIGHT: 152 LBS | BODY MASS INDEX: 25.33 KG/M2

## 2021-06-04 VITALS
RESPIRATION RATE: 20 BRPM | BODY MASS INDEX: 25.94 KG/M2 | OXYGEN SATURATION: 99 % | DIASTOLIC BLOOD PRESSURE: 84 MMHG | HEART RATE: 80 BPM | SYSTOLIC BLOOD PRESSURE: 138 MMHG | WEIGHT: 155.9 LBS

## 2021-06-04 NOTE — TELEPHONE ENCOUNTER
Left detailed message for patient informing him of Dr. Dinh's response and requested call back if he would like to proceed with stress test and/or has additional questions.  mg

## 2021-06-04 NOTE — TELEPHONE ENCOUNTER
"Rec'd return call from patient who stated he attempted to use Isordil for \"chest pains\" prior to exercising as recommended by Dr. Dinh but sx did not change or relieved.  Patient attempted 6 days in a row with no changes, only developed headaches which were intolerable.  Patient stated he has had sx \"for a few years\" which do resolve within 1-3min after stopping exercise.     Informed patient that update would be forwarded to Dr. Dinh and he would be contacted with any new recommendations - understanding verbalized.    Please review patient update r.e. trial of Isordil - no f/u sched or pending - per your 11-4-19 visit note \"Exercise nuclear stress test now\",  but no order placed - any new orders?  mg  "

## 2021-06-04 NOTE — TELEPHONE ENCOUNTER
Medication Request    Medication name:   Ventolin HFA 90 MCG Inh  2 puffs PRN Q 6 hr #36 w/refills    Pharmacy Name and Location: [facility]/PHARMACY #[id number] - [place] [id number]    Reason for request:   Pharmacy requesting this med and is it not listed on patients active med list.  Please advise if refill is appropriate and notify the pharmacy of the outcome.    When did you use medication last?:  Unknown    Patient offered appointment:    med request.     Okay to leave a detailed message: yes

## 2021-06-04 NOTE — TELEPHONE ENCOUNTER
This patient is not a [facility] Patient. Reached out to the pharmacy and left a message asking that they submit request to the correct provider.  Karolyn Trent

## 2021-06-04 NOTE — TELEPHONE ENCOUNTER
Message rec'd 12-31-19 @ 1545:        Please remove the isordil from his med list and add it to the intolerance list with headache as the side effect.     I am not sure why we did not do the stress test; perhaps he declined and I removed the order but did not update my note. If he would like to do a stress test now, please put in the order. The good news is that his CTA in 2016 showed only mild plaquing.     Gerson Cedeno MD

## 2021-06-04 NOTE — TELEPHONE ENCOUNTER
----- Message from Patricia Oshea sent at 12/30/2019  1:42 PM CST -----  General phone call:    Caller: Patient  Primary cardiologist: Dr. Dinh  Detailed reason for call: Medication to trial Isosorbide patient called in and indicates it isn't helping.  Patient was having headaches while medication.  The longest period he took medication was 6 days   New or active symptoms?   Best phone number: [phone]  Best time to contact: Anytime  Ok to leave a detailed message? Yes  Device? no    Additional Info:

## 2021-06-04 NOTE — TELEPHONE ENCOUNTER
"Per Dr. Dinh's 11-4-19 visit note \"Trial of isosorbide dinitrate 5 mg one hour prior to exercising. Exercise nuclear stress test now.\" - does not appear that no order was placed at visit.    Left message for patient requesting call back with further information.  mg  "

## 2021-06-05 NOTE — TELEPHONE ENCOUNTER
----- Message from Ela Lord sent at 1/27/2020 11:24 AM CST -----  General phone call:  PATIENT IS CALLING FOR HIS ECHO RESULTS, PLEASE CALL  Caller:patient  Primary cardiologist: Dr Dinh  Detailed reason for call: see above  New or active symptoms? no  Best phone number: [phone]  Best time to contact: any time  Ok to leave a detailed message? yes  Device? no    Additional Info:

## 2021-06-05 NOTE — TELEPHONE ENCOUNTER
Return call to patient - informed him that Lori Mejias NP ordered echo not Dr. Dinh so he would need to contact her for results - patient verbalized understanding.  mg

## 2021-06-06 NOTE — PATIENT INSTRUCTIONS - HE
It was a pleasure seeing you today.    I am glad that your feeling better after the recent pneumonia diagnosis.     The follow-up CT scan shows mild resolving areas from the prior pneumonia, and 2 mm lung nodules - these do not require follow-up. These changes are so mild I do not feel obligated to repeat a CT scan at this time, the more important thing is that clinically you are feeling better.    I can see you back as needed.      Fanny Caicedo MD  Pulmonary and Critical Care Medicine  [facility]  Office: [phone]  Pager: [phone]

## 2021-06-06 NOTE — PROGRESS NOTES
Pulmonary Clinic Outpatient Consultation    Assessment and Plan:   59 year old male with a history of reflux/GERD, previously evaluated for atypical chest pain with exercise relieved by rest and NSAIDs. He has had thorough cardiac and GI evaluations that have been normal/re-assuring. He had normal lung function tests, and a normal CT PE scan - normal lung parenchyma, no PE.    Today he was re-referred for follow-up after diagnosis and treatment of CAP. Follow-up CT scan was reviewed, with a few scattered areas of very scant ground glass opacities and 2 mm lung nodules. He has clinically improved to baseline from a pneumonia standpoint. Continues to have the atypical exertional CP.    PLAN:    CT scan consistent with resolving PNA with only mild residual changes. Lung nodules at 2 mm in a low risk patient do not require follow-up. I do not feel the very mild changes from resolving infection that has clinically responded to treatment are compelling to warrant repeat CT scan at this time. Discussed that option with the patient, he is comfortable forgoing this.    Has had full pulmonary evaluation for chest pain prior, which is normal. Unless new clinical symptoms develop, there is no additional testing to be done at this time. Though understandably he is frustrated, it is reassuring that he has had normal cardiac and pulmonary evaluations. Clinically this seems more consistent w a musculoskeletal process given its response to NSAIDs.      I can see him back as needed.      Fanny Caicedo MD  Pulmonary and Critical Care Medicine  [facility]  Office: [phone]  Pager: [phone]    -------------------------    CCx: Follow-up after CAP    HPI:   Last seen by me 9/2018. Sent back for follow-up after treatment for CAP in Dec, when he had cough, low grade temps, treated with 10 days levaquin.    Had f/u CT scan 1/14/20, which had mild abnormalities - reviewed below.    Today he notes he feels back to  "baseline, has recovered from PNA. He continues to have the atypical exertional CP. He has since had additional cardiology testing and follow-up, all of which has been negative/re-assuring.    ---------------  58 year old male with a history of reflux/GERD, presenting for evaluation of chronic atypical chest pain with exercise. He has had thorough cardiac and GI evaluations that have not revealed a causative etiology.    From a pulmonary standpoint, he has had normal pulmonary function tests, prior imaging in 2016 (CT scan) was also normal. Given his persistent chest pain with exertion, with difficulty teasing out a pleuritic component, or associated shortness of breath, I think it would be prudent to rule out any pulmonary emboli with a CT PE. This would also allow another look at the lung parenchyma, since he has not been imaged in over 2 years and symptoms persist.     ROS:  A 12-system review was obtained and was negative with the exception of the symptoms endorsed in the history of present illness.    PMH:  Past Medical History:   Diagnosis Date     ASHLEY positive 12/29/2014     Ascending aortic aneurysm; 4.0 cm by MRA 12/06/2016    3.8 cm by CTA March 12, 2019     Asymmetric septal hypertrophy (H) 01/11/2017    Mild by MRI; no further workup needed per Dr. Gardner.\"The findings indicates a focal hypertrophic cardiomyopathy in basal anteroseptal wall (1.9 cm).\"     Coronary atherosclerosis with mild plaquing on CTA 2/11/2016     Essential hypertension 1/11/2017     GE reflux      Lyme disease 2010    he had an iv port to treat Lyme disease wt chronic antibiotics for six months by Dr. Lilian Alexander at [facility] [place] with chronic lyme disease - prior long term IV abx    Physical Exam:  /84   Pulse 80   Resp 20   Wt 155 lb 14.4 oz (70.7 kg)   SpO2 99% Comment: RA  BMI 25.94 kg/m    Gen: Alert, oriented, no distress  HEENT: nasal turbinates are unremarkable, no oropharyngeal lesions, " no cervical or supraclavicular lymphadenopathy  CV: RRR, no M/G/R  Resp: CTAB, no focal crackles or wheezes  Abd: soft, nontender, no palpable organomegaly  Skin: no apparent rashes  Ext: no cyanosis, clubbing or edema  Neuro: alert, nonfocal    Labs:  Reviewed    Imaging studies:  Reviewed and interpreted:    1/14/20 CT Chest:   Images reviewed in NIL, report reviewed: notable for several 2 mm lung nodules, mild bronchiectasis, several areas of very faint ground glass opacity, barely appreciable. Read as resolving bronchitis/bronchiolitis. No adenopathy.    2/12/19 CT PE previously reviewed and unremarkable.    2016 Cardiac Stress:  EF 62% normal RV  Negative for ischemia  Focal hypertrophic cardiomyopathy in anteroseptal wall    9/12/18 PFT's   FEV1/FVC is 80 and is normal.  FEV1 is 101% predicted and is normal.  FVC is 99% predicted and normal.  There was no improvement in spirometry after a single inhaled dose of bronchodilator.  TLC is 102% predicted and is normal.  RV is 103% predicted and is normal.  DLCO is 111% predicted and is normal when it   is corrected for hemoglobin.     Impression:  Full Pulmonary Function Test is normal.

## 2021-06-06 NOTE — TELEPHONE ENCOUNTER
Was not able to reach [place] brennan in person. Left detailed VM message with my phone number to call back if questions.    Per : No, he has no indication to take inhaled steroids or any inhalers for his atypical chest pain. He had normal PFTs, no diagnosis of asthma or COPD.

## 2021-06-06 NOTE — TELEPHONE ENCOUNTER
Call from Kiko asking if he should continue to take the Dulera he has been using at home??  He still has some left if he should continue this?

## 2021-06-06 NOTE — PATIENT INSTRUCTIONS - HE
Kiko,    It was a pleasure to see you today at [facility].    Please check your blood pressure once a month with an automated cuff or with a medical provider. If it continues to be elevated you should resume treatment for it with Dr. Gonzales.    My nurse or I will call you with the newly ordered test results.    Please call us if you have any questions or concerns about your heart.      Uriah Dinh M.D.

## 2021-06-11 ENCOUNTER — RECORDS - HEALTHEAST (OUTPATIENT)
Dept: CARDIOLOGY | Facility: CLINIC | Age: 61
End: 2021-06-11

## 2021-06-17 NOTE — TELEPHONE ENCOUNTER
Telephone Encounter by Ria Quevedo RN at 5/14/2021  4:21 PM     Author: Ria Quevedo RN Service: -- Author Type: Registered Nurse    Filed: 5/14/2021  4:26 PM Encounter Date: 5/14/2021 Status: Signed    : Ria Quevedo RN (Registered Nurse)       Noted. Message sent to scheduling to call patient and arrange consult.    ----------------------------------------------------------  Gerson Dinh MD  You 3 minutes ago (4:16 PM)     I spoke with Kiko by telephone. He said that his cor CTA was normal. He does have very mild hypertrophic changes on MRI, so please set him up as a new consult with Dr. Dave or Dr. De La Vega for hypertrophic cardiomyopathy and chest pain. After speaking with me, he does not wish to go to the [facility] first off.     S    Message text       You  Gerson Dinh MD 47 minutes ago (3:32 PM)     Sorry about that. He would like to see someone at the [facility].   Thank you - Ria    Message text       Gerson Dinh MD  You 57 minutes ago (3:23 PM)     I would be happy to review the cor CTA report from [facility]; does he want to see someone other than me? Such as here in the [facility] or someone at the [facility]?     S

## 2021-06-17 NOTE — TELEPHONE ENCOUNTER
Left detailed message with the clinic direct number to call with questions. Follow up is planned March 2022 with MRI prior.

## 2021-06-17 NOTE — TELEPHONE ENCOUNTER
"Dr. Dinh ,  Do you have any specific recommendations or provider suggestions for patient to have a second opinion?  Thank you - Ria  ----------------------------------------------------------------------  Contacted patient for his questions/concerns.  Patient has had intermittent chest pain for 5 to 10 years.  Chest pain occurs while exercising and he cannot find out why - all diagnostic tests are unremarkable.  Patient had a CorCTA 5/13/21 at [facility] that was ordered by his PCP. Informed patient he will need to request the results be forwarded for review to Dr. Dinh if he wishes.  Patient is seeking a provider for a second opinion and wonders if Dr. Dinh has any suggestions. Patient states he is a \"hard case to crack\". Assured patient an update will be sent to Dr. Dinh and patient will be contacted if any new recommendations or suggestions are given.    "

## 2021-06-17 NOTE — TELEPHONE ENCOUNTER
----- Message from Ashley Ying V sent at 5/14/2021 12:28 PM CDT -----  General phone call: WOULD LIKE SUGGESTION OF ANOTHER CARDIOLOGIST TO GET SECOND OPINION ON HEART PROBLE.  PLEASE CALL BACK    Caller: NATALIE  Primary cardiologist: LUIS  Detailed reason for call: SEE ABOVE  New or active symptoms? NO  Best phone number: [phone]  Best time to contact: ANYTIME  Ok to leave a detailed message? YES  Device? NO    Additional Info:

## 2021-06-20 NOTE — LETTER
Letter by Fanny Caicedo MD at      Author: Fanny Caicedo MD Service: -- Author Type: --    Filed:  Encounter Date: 2/17/2020 Status: (Other)         Lissette Gonzales MD  [address]  [place] [address]                                  February 17, 2020    Patient: Kiko Tovar   MR Number: [id number]   YOB: 1960   Date of Visit: 2/17/2020     Dear Dr. Christian MD:    Thank you for referring Kiko Tovar to me for evaluation. Below are the relevant portions of my assessment and plan of care.    If you have questions, please do not hesitate to call me. I look forward to following Kiko along with you.    Sincerely,        Fanny Caicedo MD          CC  No Recipients  Fanny Caicedo MD  2/17/2020 12:22 PM  Sign when Signing Visit  Pulmonary Clinic Outpatient Consultation    Assessment and Plan:   59 year old male with a history of reflux/GERD, previously evaluated for atypical chest pain with exercise relieved by rest and NSAIDs. He has had thorough cardiac and GI evaluations that have been normal/re-assuring. He had normal lung function tests, and a normal CT PE scan - normal lung parenchyma, no PE.    Today he was re-referred for follow-up after diagnosis and treatment of CAP. Follow-up CT scan was reviewed, with a few scattered areas of very scant ground glass opacities and 2 mm lung nodules. He has clinically improved to baseline from a pneumonia standpoint. Continues to have the atypical exertional CP.    PLAN:    CT scan consistent with resolving PNA with only mild residual changes. Lung nodules at 2 mm in a low risk patient do not require follow-up. I do not feel the very mild changes from resolving infection that has clinically responded to treatment are compelling to warrant repeat CT scan at this time. Discussed that option with the patient, he is comfortable forgoing this.    Has had full pulmonary evaluation for chest pain prior, which is normal. Unless  new clinical symptoms develop, there is no additional testing to be done at this time. Though understandably he is frustrated, it is reassuring that he has had normal cardiac and pulmonary evaluations. Clinically this seems more consistent w a musculoskeletal process given its response to NSAIDs.      I can see him back as needed.      Fanny Caicedo MD  Pulmonary and Critical Care Medicine  [facility]  Office: [phone]  Pager: [phone]    -------------------------    CCx: Follow-up after CAP    HPI:   Last seen by me 9/2018. Sent back for follow-up after treatment for CAP in Dec, when he had cough, low grade temps, treated with 10 days levaquin.    Had f/u CT scan 1/14/20, which had mild abnormalities - reviewed below.    Today he notes he feels back to baseline, has recovered from PNA. He continues to have the atypical exertional CP. He has since had additional cardiology testing and follow-up, all of which has been negative/re-assuring.    ---------------  58 year old male with a history of reflux/GERD, presenting for evaluation of chronic atypical chest pain with exercise. He has had thorough cardiac and GI evaluations that have not revealed a causative etiology.    From a pulmonary standpoint, he has had normal pulmonary function tests, prior imaging in 2016 (CT scan) was also normal. Given his persistent chest pain with exertion, with difficulty teasing out a pleuritic component, or associated shortness of breath, I think it would be prudent to rule out any pulmonary emboli with a CT PE. This would also allow another look at the lung parenchyma, since he has not been imaged in over 2 years and symptoms persist.     ROS:  A 12-system review was obtained and was negative with the exception of the symptoms endorsed in the history of present illness.    PMH:  Past Medical History:   Diagnosis Date   ? ASHLEY positive 12/29/2014   ? Ascending aortic aneurysm; 4.0 cm by MRA 12/06/2016    3.8 cm by  "CTA March 12, 2019   ? Asymmetric septal hypertrophy (H) 01/11/2017    Mild by MRI; no further workup needed per Dr. Gardner.\"The findings indicates a focal hypertrophic cardiomyopathy in basal anteroseptal wall (1.9 cm).\"   ? Coronary atherosclerosis with mild plaquing on CTA 2/11/2016   ? Essential hypertension 1/11/2017   ? GE reflux    ? Lyme disease 2010    he [place] port to treat L[place] disease wth chronic antibiotics [place] six months [place] [facility] [place] - prior long term IV abx    Physical Exam:  /84   Pulse 80   Resp 20   Wt 155 lb 14.4 oz (70.7 kg)   SpO2 99% Comment: RA  BMI 25.94 kg/m     Gen: Alert, oriented, no distress  HEENT: nasal turbinates are unremarkable, no oropharyngeal lesions, no cervical or supraclavicular lymphadenopathy  CV: RRR, no M/G/R  Resp: CTAB, no focal crackles or wheezes  Abd: soft, nontender, no palpable organomegaly  Skin: no apparent rashes  Ext: no cyanosis, clubbing or edema  Neuro: alert, nonfocal    Labs:  Reviewed    Imaging studies:  Reviewed and interpreted:    1/14/20 CT Chest:   Images reviewed in [facility]IL, report reviewed: notable for several 2 mm lung nodules, mild bronchiectasis, several areas of very faint ground glass opacity, barely appreciable. Read as resolving bronchitis/bronchiolitis. No adenopathy.    2/12/19 CT PE previously reviewed and unremarkable.    2016 Cardiac Stress:  EF 62% normal RV  Negative for ischemia  Focal hypertrophic cardiomyopathy in anteroseptal wall    9/12/18 PFT's   FEV1/FVC is 80 and is normal.  FEV1 is 101% predicted and is normal.  FVC is 99% predicted and normal.  There was no improvement in spirometry after a single inhaled dose of bronchodilator.  TLC is 102% predicted and is normal.  RV is 103% predicted and is normal.  DLCO is 111% predicted and is normal when it   is corrected for hemoglobin.     Impression:  Full Pulmonary Function Test is " normal.

## 2021-06-20 NOTE — PROGRESS NOTES
Cpft with pre and post spirometry done using albuterol 2.5 mg neb.  Ats standards met, patient mg well, results scanned to patients chart

## 2021-06-20 NOTE — PROGRESS NOTES
Pulmonary Clinic Outpatient Consultation    Assessment and Plan:   58 year old male with a history of reflux, presenting for evaluation of chronic atypical chest pain with exercise. He has had thorough cardiac and GI evaluations that have not revealed a causative etiology.    From a pulmonary standpoint, he has had normal pulmonary function tests, prior imaging in 2016 (CT scan) was also normal. Given his persistent chest pain with exertion, with difficulty teasing out a pleuritic component, or associated shortness of breath, I think it would be prudent to rule out any pulmonary emboli with a CT PE. This would also allow another look at the lung parenchyma, since he has not been imaged in over 2 years and symptoms persist. Other considerations include atypical presentation of bronchospasm, or exercise-induced vocal cord dysfunction.     We discussed today my recommendation to obtain a CT PE as a first step. My suspicion for PE is not high, but I think this diagnosis should be ruled out. He is concerned about the cost of any additional testing, as he has not met his insurance deductible. He would like to call his insurance company before proceeding with any tests. I recommended that we schedule a follow-up appointment, but he declined, and would like to first consult with his naturopathic provider and trial any other medicines for his symptoms before returning to see me.     PLAN:    I recommended CT PE - he would prefer to discuss costs with his insurance prior to proceeding with testing    He declined follow-up appointment    He will call me if he wants to proceed with CT scan, and follow-up for further evaluation        I appreciate the opportunity to participate in the care of Mr. Tovar.  Please feel free to contact me at any time.    Fanny Caicedo MD  Pulmonary and Critical Care Medicine  [facility]  Office: [phone]  Pager: [phone]    -------------------------    CCx: Chest pain    HPI:    He notes that he started having pain in the chest with exercise (hiking) 2-3 years ago. This occurs with nearly every hike - pain is central-left sided, dull in nature, can extend to the throat area but otherwise non-radiating. The pain does not occur at rest. Has had to stop hiking due to pain, it is usually relieved by rest, has no other alleviating/exacerbating factors. He is uncertain if it is pleuritic in nature, does not think there is associated shortness of breath. Feels the symptoms have worsened over the last 2-3 years. He has not tried/is uncertain if the pain occurs with other activities, generally only occurs with hiking. Denies wheeze, denies cough but notes frequent throat coughing, but no post-nasal drip.    He had previous full cardiac, and GI evaluations that were unremarkable/did not reveal an etiology for the chest pain. Has no personal hx of tobacco, some 2nd hand exposure, could not quantify. No personal or family history of blood clots.  The pain is causing him to limit exercise, wants to be able to run/exercise.      ROS:  A 12-system review was obtained and was negative with the exception of the symptoms endorsed in the history of present illness.    PMH:  Past Medical History:   Diagnosis Date     ASHLEY positive 12/29/2014     Ascending aortic aneurysm; 4.0 cm by MRA 12/6/2016     Coronary atherosclerosis with mild plaquing on CTA 2/11/2016     Essential hypertension 1/11/2017     GE reflux    Dx with chronic lyme disease - prior long term IV abx    PSH:  No past surgical history on file.     Allergies:  No Known Allergies    Family HX:  Family History   Problem Relation Age of Onset     Chronic Kidney Disease Mother      Hypertension Father      Peripheral vascular disease Father 82     No Medical Problems Sister      Cancer Brother      laryngeal and esophageal cancer     No Medical Problems Brother      No Medical Problems Brother      Social Hx:  Social History     Social History      Marital status:      Spouse name: N/A     Number of children: 0     Years of education: 16     Occupational History     private , composer and musician Self Employed     Social History Main Topics     Smoking status: Never Smoker     Smokeless tobacco: Never Used     Alcohol use Yes      Comment: rarely     Drug use: Not on file     Sexual activity: Not on file     Other Topics Concern     Not on file     Social History Narrative    Lives alone; bachelors in music from [employer]. He swims for 35 minutes or hikes 30-45 minutes three times a week.   No known environmental or occupational exposures    Current Meds:  Current Outpatient Prescriptions   Medication Sig Dispense Refill     amitriptyline (ELAVIL) 100 MG tablet Take 100 mg by mouth bedtime.       atorvastatin (LIPITOR) 20 MG tablet Take 1 tablet (20 mg total) by mouth daily. 30 tablet 0     cholecalciferol, vitamin D3, 5,000 unit Tab Take 1 tablet by mouth daily. 1-2 tablets per day per chiropractors recommendations       gabapentin (NEURONTIN) 300 MG capsule Take 300 mg by mouth daily.       MAGNESIUM ORAL Take 1 tablet by mouth daily. Takes 1 tsp daily of magnesium chl sol 12.5%       OM-3-EPA-DHA-FISH OIL-FLAX-E ORAL Take 2 tablets by mouth 2 (two) times a day. 860mg and 580mg respectively       No current facility-administered medications for this visit.        Physical Exam:  There were no vitals taken for this visit.  Gen: Alert, oriented, no distress  HEENT: nasal turbinates are unremarkable, no oropharyngeal lesions, no cervical or supraclavicular lymphadenopathy  CV: RRR, no M/G/R. No palpable/reproducible chest pain  Resp: CTAB, no focal crackles or wheezes  Abd: soft, nontender, no palpable organomegaly  Skin: no apparent rashes  Ext: no cyanosis, clubbing or edema  Neuro: alert, nonfocal    Labs:  Reviewed  Hgb 14    Imaging studies:  No recent chest imaging -     2/2016 CT Chest:  Normal study    2016 Cardiac  Stress:  EF 62% normal RV  Negative for ischemia  Focal hypertrophic cardiomyopathy in anteroseptal wall    9/12/18 PFT's   FEV1/FVC is 80 and is normal.  FEV1 is 101% predicted and is normal.  FVC is 99% predicted and normal.  There was no improvement in spirometry after a single inhaled dose of bronchodilator.  TLC is 102% predicted and is normal.  RV is 103% predicted and is normal.  DLCO is 111% predicted and is normal when it   is corrected for hemoglobin.     Impression:  Full Pulmonary Function Test is normal.

## 2021-06-24 NOTE — TELEPHONE ENCOUNTER
Called Kiko to let him know his CT scan did not show any blood clots. The lungs looked fine. There is no explanation for his symptoms based on this CT scan. He should follow up with Dr. guerrero as scheduled to discuss next steps.   He is not sure what  he next steps are and if it is worth following up with pulmonary. Would like to know what else Dr. Guerrero may do at a follow up. Will update Dr. Guerrero.

## 2021-06-24 NOTE — TELEPHONE ENCOUNTER
Reviewed results of recent, negative CT scan. These were  communicated to the patient by Dr. Harris. He has no scheduled follow-up with me at this time (had declined). Called to offer follow-up to discuss symptoms any additional testing, unable to reach patient and left VM requesting call back.        Fanny Caicedo MD  Pulmonary and Critical Care Medicine  [facility]  Office: [phone]  Pager: [phone]

## 2021-06-25 NOTE — PROGRESS NOTES
Progress Notes by Aldo Gardner MD at 1/11/2017 10:30 AM     Author: Aldo Gardner MD Service: -- Author Type: Physician    Filed: 1/11/2017 11:58 AM Encounter Date: 1/11/2017 Status: Signed    : Aldo Gardner MD (Physician)           Click to link to [facility] HEART CARE CONSULTATION NOTE    Thank you, Dr. Gonzales, for asking the [facility] team to see Kiko Messina in consultation at [facility] to evaluate chest pain.      Assessment/Recommendations   Assessment:    1.  Chest pain, atypical for angina with no evidence of significant ischemia induced with stress testing or MRMichael  Unlikely to be arrhythmic in nature.  He does have esophageal reflux and this may represent exercise-induced bronchospasm.  We will check a 24-hour Holter monitor to make sure there are no arrhythmias associated with his symptoms.  2.  Hypertension, borderline, with mild dilatation of the aortic root.  I would favor initiating treatment with propranolol LA 60 mg daily.   3.  Asymmetric septal hypertrophy.  This is mild and with only mild suggested by cardiac MR, does not merit further intervention at this time.    Plan:  1.  Advised continue moderate regular exercise    2.  24-hour Holter monitor to look for arrhythmias associated with symptoms  3.  After Holter monitor, start propranolol LA 60 mg daily    Follow-up 6 months       History of Present Illness    Mr. Kiko Messina is a 56 y.o. male presenting with a 2-3 year history of substernal chest pain with throat burning.  This pain has occurred with hiking or brisk walks it is consistently related to exertion and may be more frequent than what it was 2 years ago.  4 Evaluation of this had a number of tests performed.    First was a graded exercise test, on which he was able to walk 13 minutes with no chest pain.  There was no electrographic evidence of ischemia.  He subsequently had a CT coronary angiogram  that showed no significant plaque, but a mild dilatation of the ascending aorta.  This was confirmed on echocardiogram that also showed a mild amount of basal septal hypertrophy.  In December a cardiac MRI again showed no evidence of infarction or ischemia, and only a mild amount of late gadolinium enhancement suggesting a mild amount of patchy scar was demonstrated in the left ventricular septum.  This is a low risk for arrhythmic events.    He has a timesthis with this little activity as climbing steps.  The discomfort resolves within a minute.  He said no syncope or near-syncopal spells.  He did have syncopal episode 15 or 20 years ago associated with emotional stress.    There is no family history of sudden cardiac death.  He has gained about 19 pounds in the last year although he is dressed more warmly today than on previous measurements.  He works as a percussion composer,  , and musician.  He seldom has stage fright      Exercise: Aerobic work out 3-4 times a week  Cardiodiagnostics:   Echocardiogram 3/2016: Summary  Regular rhythm at 60 beats per minute.  Normal left ventricular cavity size and systolic function.  Left ventricular ejection fraction is visually estimated to be 55%.  Asymmetric left ventricular septal hypertrophy.  Aortic root is borderline enlarged.    Graded exercise test 8/2015:13 minutes on a Finn protocol  CONCLUSION:  1. Very good exercise tolerance for age.  2. Very mild chest discomfort noted with exercise.  3. Stress electrocardiogram was negative for inducible ischemia.    2/2016: Coronary CT angiogram   CONCLUSIONS:  1. Total Agatston calcium score of 20 suggesting a low risk of future coronary events.  2. Mild coronary atherosclerosis with no obstructive lesions identified.  3. Mildly dilated ascending aorta.    Cardiac MR 12/2016:  IMPRESSIONS:   1. Lexiscan stress cardiac MRI is negative for inducible myocardial ischemia.   2. Lexiscan stress ECG is negative for  inducible myocardial ischemia.  3. No previous myocardial infarction is identified.  4. Cardiac MRI images indicated that there is a focal hypertrophic cardiomyopathy at basal anterior septal wall (1.9 cm in thickness) with mild patchy gadolinium enhancement.   5. Normal left ventricular size and function. The calculated left ventricular ejection fraction is 62%.  6. Normal right ventricular size and function.  7. No obvious valvular disease.  8. Black blood images indicated mildly enlarged mid ascending aorta, measured 4.0 cm in size  Comments: Based cardiac MRI image findings, there is a low risk of cardiac event from mild patchy enhancement. Hypertrophic cardiomyopathy is also focus in basal anteroseptal wall.    ECG: Personally reviewed. {5 December 2016: Normal Sinus rhythm, normal ECG      Physical Examination Review of Systems   Vitals:    01/11/17 1042   BP: 130/78   Pulse: 94   Resp: 16     Body mass index is 26.46 kg/(m^2).  Wt Readings from Last 3 Encounters:   01/11/17 159 lb (72.1 kg)   11/02/16 155 lb (70.3 kg)   02/24/16 145 lb (65.8 kg)     General Appearance:   no distress, normal body habitus   ENT/Mouth: membranes moist, no oral lesions or bleeding gums.      EYES:  no scleral icterus, normal conjunctivae   Neck: no carotid bruits or thyromegaly   Chest/Lungs:   lungs are clear to auscultation, no rales or wheezing, equal chest wall expansion    Cardiovascular:   Regular. Normal first and second heart sounds with no murmurs, rubs, or gallops; the carotid, radial and posterior tibial pulses are intact    Abdomen:  no organomegaly, masses, bruits, or tenderness; bowel sounds are present   Extremities: no cyanosis or clubbing, edema    Skin: no xanthelasma, warm.    Neurologic: normal gait, normal  bilateral, no tremors     Psychiatric: alert and oriented x3, calm     General: WNL  Eyes: WNL  Ears/Nose/Throat: WNL  Lungs: WNL  Heart: WNL  Stomach: WNL  Bladder: WNL  Muscle/Joints: WNL  Skin:  WNL  Nervous System: WNL  Mental Health: WNL     Blood: WNL     Medical History  Surgical History Family History Social History   Past Medical History   Diagnosis Date   ? ASHLEY positive 12/29/2014   ? Ascending aortic aneurysm; 4.0 cm by MRA 12/6/2016   ? Coronary atherosclerosis with mild plaquing on CTA 2/11/2016   ? GE reflux     History reviewed. No pertinent past surgical history. Family History   Problem Relation Age of Onset   ? Chronic Kidney Disease Mother    ? Hypertension Father    ? No Medical Problems Sister    ? Cancer Brother      laryngeal and esophageal cancer   ? No Medical Problems Brother    ? No Medical Problems Brother     Social History     Social History   ? Marital status:      Spouse name: N/A   ? Number of children: 0   ? Years of education: 16     Occupational History   ? private , composer and musician Self Employed     Social History Main Topics   ? Smoking status: Never Smoker   ? Smokeless tobacco: Never Used   ? Alcohol use Yes      Comment: rarely   ? Drug use: Not on file   ? Sexual activity: Not on file     Other Topics Concern   ? Not on file     Social History Narrative    Lives alone; bachelors in music from [employer] [place]. He swims for 35 minutes or hikes 30-45 minutes three times a week.          Medications  Allergies   Current Outpatient Prescriptions   Medication Sig Dispense Refill   ? amitriptyline (ELAVIL) 100 MG tablet Take 100 mg by mouth bedtime.     ? aspirin 81 MG EC tablet Take 1 tablet (81 mg total) by mouth daily. 100 tablet 3   ? atorvastatin (LIPITOR) 20 MG tablet TAKE 1 TABLET (20 MG TOTAL) BY MOUTH BEDTIME. *NEED LABS FOR REFILLS 90 tablet 2   ? cholecalciferol, vitamin D3, 5,000 unit Tab Take 1 tablet by mouth daily. 1-2 tablets per day per chiropractors recommendations     ? clonazePAM (KLONOPIN) 0.5 MG tablet Take 0.5 mg by mouth daily.     ? gabapentin (NEURONTIN) 300 MG capsule Take 300 mg by mouth daily.     ? MAGNESIUM ORAL  Take 1 tablet by mouth daily. Takes 1 tsp daily of magnesium chl sol 12.5%     ? OM-3-EPA-DHA-FISH OIL-FLAX-E ORAL Take 2 tablets by mouth 2 (two) times a day. 860mg and 580mg respectively       No current facility-administered medications for this visit.       No Known Allergies      Lab Results    Chemistry/lipid CBC Cardiac Enzymes/BNP/TSH   Lab Results   Component Value Date    CHOL 226 (H) 03/10/2016    HDL 55 03/10/2016    LDLCALC 157 (H) 03/10/2016    TRIG 72 03/10/2016    CREATININE 1.00 12/29/2014    Lab Results   Component Value Date    WBC 5.0 12/29/2014    HGB 13.2 (L) 10/17/2016    HCT 46.5 12/29/2014    MCV 92 12/29/2014     12/29/2014    No results found for: CKTOTAL, CKMB, CKMBINDEX, TROPONINI, BNP, TSH       Aldo Gardner MD [facility]

## 2021-06-28 NOTE — PROGRESS NOTES
Progress Notes by Gerson Dinh MD at 2/11/2020 10:30 AM     Author: Gerson Dinh MD Service: -- Author Type: Physician    Filed: 2/11/2020 11:28 AM Encounter Date: 2/11/2020 Status: Signed    : Gerson Dinh MD (Physician)             Assessment:    1. Nonspecific chest pain  Echo Stress Exercise    ECG Clinic - Today   2. Ascending aortic aneurysm; 4.0 cm by MRA         Plan:    1. Kiko stopped propranolol for hypertension a few years ago; I advised him to check his blood pressure once a month with an automated cuff at home or with a medical provider and to resume treatment for hypertension under the supervision of Dr. Lissette Gonzales if his blood pressure is persistently elevated.  2. He did not schedule the exercise nuclear stress test that I recommended in November; he agrees to schedule an exercise echo stress test at this time.  I counseled him that I was optimistic it would once again be normal.  If it is, I do not have any other cardiac testing to recommend at this time.    An After Visit Summary was printed and given to the patient.    This visit comprised 40 minutes of time of which more than 50% was spent in counseling and care coordination.    Subjective:    Kiko Tovar returned for an unplanned follow up visit.  His friend, Fani, accompanied him to the visit.    Kiko continues to exercise by hiking vigorously for 35 minutes for 5 times a week.  He states he has a high resting heart rate.  He wonders if his heart rate or mildly enlarged thoracic aorta are causing his discomfort.  He is on both Elavil and Neurontin for sleep.  He states they were prescribed by Dr. Toscano, a sleep specialist, of the [facility].  He recalls being on ibuprofen in the past and states his chest pains were better.  The chest pain occurs mostly with exertion and he points to the left upper anterior pectoral area.  He states they resolve with sitting.  There are no associated symptoms such as nausea,  "diaphoresis or dyspnea.  He was evaluated for the same problem a few years ago and had a negative stress test and then underwent CT coronary angiography which showed only mild plaquing.  He was placed on a statin and low-dose aspirin at that time.    Past Medical History:    Patient Active Problem List   Diagnosis   ? Ascending aortic aneurysm; 4.0 cm by MRA   ? Essential hypertension   ? Adjustment disorder with mixed anxiety and depressed mood       Past Medical History:   Diagnosis Date   ? ASHLEY positive 2014   ? Ascending aortic aneurysm; 4.0 cm by MRA 2016   ? Asymmetric septal hypertrophy (H) 2017    Mild by MRI; no further workup needed per Dr. Gardner.\"The findings indicates a focal hypertrophic cardiomyopathy in basal anteroseptal wall (1.9 cm).\"   ? Coronary atherosclerosis with mild plaquing on CTA 2016   ? Essential hypertension 2017   ? GE reflux    ? Lyme disease     he had an iv port to treat Lyme disease wth chronic antibiotics for six months by Dr. Lilian Alexander at [facility]       Past Surgical History:   Procedure Laterality Date   ? IR PORT PLACEMENT >5 YEARS     ? IR PORT REMOVAL          reports that he has never smoked. He has never used smokeless tobacco. He reports current alcohol use.    Family History   Problem Relation Age of Onset   ? Chronic Kidney Disease Mother    ? Hypertension Father    ? Peripheral vascular disease Father 82   ? Dementia Father    ? Other Father 82         one week after a fall at home;  in    ? No Medical Problems Sister    ? Cancer Brother         laryngeal and esophageal cancer   ? No Medical Problems Brother    ? No Medical Problems Brother        Outpatient Encounter Medications as of 2020   Medication Sig Dispense Refill   ? amitriptyline (ELAVIL) 100 MG tablet Take 100 mg by mouth bedtime.     ? aspirin 81 MG EC tablet Take 81 mg by mouth daily.     ? atorvastatin (LIPITOR) 20 MG " "tablet Take 1 tablet (20 mg total) by mouth daily. 30 tablet 0   ? DULERA 100-5 mcg/actuation HFAA inhaler Inhale 1 puff 2 (two) times a day.     ? flaxseed oil Oil Take by mouth daily.     ? gabapentin (NEURONTIN) 100 MG capsule Take 100 mg by mouth at bedtime. Take with 600 mg capsule to equal 700 mg at bedtime     ? gabapentin (NEURONTIN) 300 MG capsule Take 600 mg by mouth at bedtime. Take with 100 mg capsule to equal 700 mg at bedtime           ? LUTEIN ORAL Take by mouth daily.     ? MAGNESIUM CHLORIDE ORAL Take by mouth daily.     ? melatonin 1 mg Tab tablet Take 1 mg by mouth at bedtime as needed.     ? [DISCONTINUED] cholecalciferol, vitamin D3, 5,000 unit Tab Take 1 tablet by mouth daily. 1-2 tablets per day per chiropractors recommendations     ? [DISCONTINUED] glutamine, bulk, (L-GLUTAMINE) Powd Use As Directed. 5-10 grams     ? [DISCONTINUED] MAGNESIUM ORAL Take 1 tablet by mouth daily. Takes 1 tsp daily of magnesium chl sol 12.5%     ? [DISCONTINUED] OM-3-EPA-DHA-FISH OIL-FLAX-E ORAL Take 2 tablets by mouth 2 (two) times a day. 860mg and 580mg respectively       No facility-administered encounter medications on file as of 2/11/2020.        Review of Systems:     General: WNL  Eyes: WNL  Ears/Nose/Throat: WNL  Lungs: WNL  Heart: Chest Pain, Arm Pain  Stomach: WNL  Bladder: Frequent Urination at Night  Muscle/Joints: Joint Pain  Skin: WNL  Nervous System: WNL  Mental Health: Confusion     Blood: WNL    Objective:     BP (!) 148/92 (Patient Site: Left Arm, Patient Position: Sitting, Cuff Size: Adult Regular)   Pulse 88   Resp 16   Ht 5' 5\" (1.651 m)   Wt 152 lb (68.9 kg)   BMI 25.29 kg/m    Wt Readings from Last 5 Encounters:   02/11/20 152 lb (68.9 kg)   01/08/20 151 lb (68.5 kg)   11/04/19 151 lb (68.5 kg)   09/17/18 148 lb (67.1 kg)   01/11/17 159 lb (72.1 kg)        Physical Exam:    GENERAL APPEARANCE: alert, no apparent distress  EYES: no scleral icterus  NECK: no carotid bruits or adenopathy, " jugular venous pressure is less than 5 cm  CHEST: symmetric, the lungs are clear to auscultation  CARDIOVASCULAR: regular rhythm without murmur or gallop  EXTREMITIES: no cyanosis, clubbing or edema  SKIN: no xanthelasma  NEUROLOGIC: normal gait and coordination  PSYCHIATRIC: affect is lightly flat and mood is discerned    Cardiac Testing:    EKG: Sinus rhythm, normal EKG per my personal review.    MR Cardiac Stress With and Without Contr (Order [id number])   Imaging   Date: 2016 Department: [facility] Ordering/Authorizing: Gerson Dinh MD   Study Result     [facility] Cardiac MRI Report  Cardiac MRI Lexiscan Stress with and without Contrast        Patient: Kiko Messina   MRN: [id number]  : 1960  Primary Care Provider: Lissette Gonzales MD  Ordering Physician: Gerson Dinh MD  Date of Service: 2016  Location: [facility]      Indication: Chronic chest pain     TECHNIQUE:  ECG gated MRI of the heart without and with IV contrast (30 mL Magnevist of IV gadolinium) performed on a Siemens Aera 1.5 Belkis MRI scanner.  After obtaining  images, cardiac imaging planes in the short axis and four-chamber axis were obtained using stress perfusion, resting perfusion and cine sequencing using stress MRI protocol.  Intravenous gadolinium was injected during stress (0.075 mmol/Kg) and resting perfusion imaging (0.075 mmol/Kg) followed by delayed enhancement imaging.  0.4 mg of intravenous Lexiscan was administered over 15 seconds and stress images were preformed.  The patient's heart rate varied from 68 beat per minute to 120 beats per minute.  Blood pressure varied from 138/86 to 96/71 mm Hg.  Post stress ECG was preformed with no inducible ischemia noted.       Axial HASTE and steady state free precession images, multiplanar steady-state free procession cine images, multiplanar gradient echo cine images and T1 and T2 Turbo spin-echo images with and without Fat  saturation suppression and late delayed gadolinium enhancement images were also obtained.  Physical myocardial measurements were obtained by the physician using [employer] workstation.     FINDINGS:  LEFT VENTRICLE:  Left ventricle is in normal size with a focal thickened myocardium in basal anteroseptal wall with maximal measurement of 1.9 cm. The rest wall is in normal range.  The tagging images indicated less deformity of thickened myocardium when compared normal wall.   Normal myocardial mass or thrombus.  No obvious regional wall motion abnormalities.       Lexiscan stress cardiac MRI demonstrated that there is no perfusion defect on both stress and rest images. There is no new segmental wall motion abnormality on stress images.  There is no indication of inducible myocardial ischemia.     The late delayed gadolinium enhancement images demonstrated that there is weak gadolinium enhancement in thickened myocardium.     The findings indicates a focal hypertrophic cardiomyopathy in basal anteroseptal wall.      LV EF:  62 % (normal 56 to 78%)     Absolute Volume:   LV EDV:  110 mL (normal 77 to 195 mL)  LV ESV:  42 mL (normal 19 to 72 mL)   LV SV:  68 mL (normal  51 to 133 mL)   CO:  4.5 L/minute (normal 2.8 to 8.8 L/minute)  Myocardial Mass:  112 g (normal 118 to 238 g)  Index Volumes: Height: 68 inches, Weight: 149 pounds  LV EDV Index: 61 mL/sq-m (normal 47 to 92 mL/sq-m)  LV ESV Index: 23 mL/sq-m (normal 13 to 30 mL/sq-m)  LV SV Index:  38 mL/sq-m (normal 32 to 62 ml/sq-m)  Cl:  2.5 L/minute/sq-m (normal 1.7 to 4.2 L/minute/sq-m)  Myocardial Mass:  62 (70 to 113 g/m2)     RIGHT VENTRICLE:  Right ventricle is normal size with normal right ventricular systolic function.  No aneurysmal wall or regional wall motion abnormalities.      ATRIA:  Normal left atrial size. Normal right atrial size.  No atrial septal defect.   AORTIC VALVE:  Tricuspid valve.  No aortic insufficiency. No significant aortic stenosis.  MITRAL  VALVE:  Normal structure. No significant mitral valve stenosis.  No mitral regurgitation.  TRICUSPID VALVE:  No structure abnormality. No tricuspid regurgitation.     PULMONIC VALVE:  No significant pulmonic stenosis or regurgitation.    PERICARDIUM:  No pericardial effusion.    CORONARY ARTERIES:  Normal right coronary and left coronary artery origin.   AORTA:  Block blood images indicated mildly enlarged mid ascending aorta, measured 4.0 cm in size.       EXTRACARDIAC STRUCTURES:  Please refer to radiology interpretation for extracardiac structures     IMPRESSIONS:       1.  Lexiscan stress cardiac MRI is negative for inducible myocardial ischemia.   2.  Lexiscan stress ECG is negative for inducible myocardial ischemia.  3.  No previous myocardial infarction is identified.  4.  Cardiac MRI images indicated that there is a focal hypertrophic cardiomyopathy at basal anterior septal wall (1.9 cm in thickness) with mild patchy gadolinium enhancement.   5.  Normal left ventricular size and function. The calculated left ventricular ejection fraction is 62%.  6.  Normal right ventricular size and function.  7.  No obvious valvular disease.  8.  Black blood images indicated mildly enlarged mid ascending aorta, measured 4.0 cm in size        Comments: Based cardiac MRI image findings, there is a low risk of cardiac event from mild patchy enhancement. Hypertrophic cardiomyopathy is also focus in basal anteroseptal wall.         Echocardiogram:   Results for orders placed during the hospital encounter of 01/08/20   Echo Complete [ECH10] 01/08/2020    Narrative 1. Normal left ventricular size and systolic performance with a visually   estimated ejection fraction of 65-70%.   2. There is mild concentric increase in left ventricular wall thickness.   3. No significant valvular heart disease is identified on this study.   4. The left atrium is of normal size.     When compared to the prior real-time echocardiogram dated 10 March  "2016,   there has been little appreciable interval change.           Results for orders placed during the hospital encounter of 08/21/15   Stress Test Graded [[id number]] 08/21/2015    Narrative PRIMARY CARE PHYSICIAN:  Lissette Bahena MD    ORDERING PHYSICIAN:  Lissette Bahena MD    INDICATION:  Chest pain.    PROCEDURE:  Graded exercise stress test.    STRESS SUMMARY:  The patient exercised for 13 minutes and 0 seconds   according to the  Finn protocol, stopping due to dyspnea and fatigue.  He reported   \\"flashes of mild  chest discomfort\\" during exercise, rated 1/10.  The resting heart rate   was 76 beats  per minute and increased to 156 beats per minute at peak exercise,   achieving 95% of  his maximum predicted heart rate.  The resting blood pressure was 138/86,   reaching  158/78 at peak exercise.  The resting EKG demonstrates normal sinus rhythm   without  ST abnormalities.  There were no ST changes with exercise.  No   stress-induced  arrhythmias.    CONCLUSION:  1.  Very good exercise tolerance for age.  2.  Very mild chest discomfort noted with exercise.  3.  Stress electrocardiogram was negative for inducible ischemia.      VIJAY GONZALEZ MD  ta  D 08/21/2015 16:54:21  T 08/21/2015 19:22:32  R 08/21/2015 19:22:32  [id number]      cc: LISSETTE BAHENA MD          Imaging:    No results found.    Lab Results:    Lab Results   Component Value Date    CREATININE 1.00 12/29/2014     Lab Results   Component Value Date    CHOL 226 (H) 03/10/2016    TRIG 72 03/10/2016    HDL 55 03/10/2016     Creatinine (mg/dL)   Date Value   12/29/2014 1.00     LDL Calculated (mg/dL)   Date Value   03/10/2016 157 (H)     Lab Results   Component Value Date    WBC 5.0 12/29/2014    HGB 14.3 09/12/2018    HCT 46.5 12/29/2014    MCV 92 12/29/2014     12/29/2014           Much or all of the text in this note was generated through the use of the Dragon Dictate voice-to-text software. Errors in spelling or words " which seem out of context are unintentional. Sound alike errors, in particular, may have escaped editing.

## 2021-06-28 NOTE — PROGRESS NOTES
Progress Notes by Gerson Dinh MD at 11/4/2019  1:10 PM     Author: Gerson Dinh MD Service: -- Author Type: Physician    Filed: 11/4/2019  1:48 PM Encounter Date: 11/4/2019 Status: Signed    : Gerson Dinh MD (Physician)             Assessment:    1. Ascending aortic aneurysm; 4.0 cm by MRA     2. Precordial pain  NM Exercise Stress Test    isosorbide dinitrate (ISORDIL) 5 MG tablet       Plan:    1. Trial of isosorbide dinitrate 5 mg one hour prior to exercising.  2. Exercise nuclear stress test now.    An After Visit Summary was printed and given to the patient.    Subjective:    Kiko Tovar returned for a planned triannual follow up visit.    His CT scan done for other reasons earlier this year showed stable mild dilation of the ascending aorta.     He still has exertional chest pain that is substernal and may radiate to the lower neck. There are no associated symptoms such as nausea or dyspnea. The pain stops within one minute of stopping exercise. His heart rate may rise as high as 145 bpm.  The discomfort never occurs at rest nor does it awaken him from sleep.    Past Medical History:    Patient Active Problem List   Diagnosis   ? Ascending aortic aneurysm; 4.0 cm by MRA   ? Essential hypertension   ? Adjustment disorder with mixed anxiety and depressed mood       Past Medical History:   Diagnosis Date   ? ASHLEY positive 12/29/2014   ? Ascending aortic aneurysm; 4.0 cm by MRA 12/6/2016   ? Asymmetric septal hypertrophy (H) 1/11/2017    Mild by MRI; no further workup needed per Dr. Gardner.   ? Coronary atherosclerosis with mild plaquing on CTA 2/11/2016   ? Essential hypertension 1/11/2017   ? GE reflux    ? Lyme disease 2010    he had an iv port to treat Lyme disease [facility] chronic antibiotics for six months by Dr. Lilian Alexander at [facility]       Past Surgical History:   Procedure Laterality Date   ? IR PORT PLACEMENT >5 YEARS  2010   ? IR PORT REMOVAL  2011         reports that he has never smoked. He has never used smokeless tobacco. He reports current alcohol use.    Family History   Problem Relation Age of Onset   ? Chronic Kidney Disease Mother    ? Hypertension Father    ? Peripheral vascular disease Father 82   ? No Medical Problems Sister    ? Cancer Brother         laryngeal and esophageal cancer   ? No Medical Problems Brother    ? No Medical Problems Brother        Outpatient Encounter Medications as of 11/4/2019   Medication Sig Dispense Refill   ? amitriptyline (ELAVIL) 100 MG tablet Take 100 mg by mouth bedtime.     ? aspirin 81 MG EC tablet Take 81 mg by mouth daily.     ? atorvastatin (LIPITOR) 20 MG tablet Take 1 tablet (20 mg total) by mouth daily. 30 tablet 0   ? flaxseed oil Oil Take by mouth daily.     ? gabapentin (NEURONTIN) 100 MG capsule Take 100 mg by mouth at bedtime. Take with 600 mg capsule to equal 700 mg at bedtime     ? gabapentin (NEURONTIN) 300 MG capsule Take 600 mg by mouth at bedtime. Take with 100 mg capsule to equal 700 mg at bedtime           ? LUTEIN ORAL Take by mouth daily.     ? MAGNESIUM CHLORIDE ORAL Take by mouth daily.     ? MAGNESIUM ORAL Take 1 tablet by mouth daily. Takes 1 tsp daily of magnesium chl sol 12.5%     ? melatonin 1 mg Tab tablet Take 1 mg by mouth at bedtime as needed.     ? OM-3-EPA-DHA-FISH OIL-FLAX-E ORAL Take 2 tablets by mouth 2 (two) times a day. 860mg and 580mg respectively     ? cholecalciferol, vitamin D3, 5,000 unit Tab Take 1 tablet by mouth daily. 1-2 tablets per day per chiropractors recommendations     ? glutamine, bulk, (L-GLUTAMINE) Powd Use As Directed. 5-10 grams     ? isosorbide dinitrate (ISORDIL) 5 MG tablet Take 1 tablet (5 mg total) by mouth daily. Take one tablet one or more hours before exercise. Hold it the day of the stress test. 30 tablet 11     No facility-administered encounter medications on file as of 11/4/2019.        Review of Systems:     General: WNL  Eyes: WNL  Ears/Nose/Throat:  "Hearing Loss  Lungs: Cough, Snoring  Heart: Chest Pain  Stomach: Constipation  Bladder: Frequent Urination at Night  Muscle/Joints: Joint Pain  Skin: WNL  Nervous System: WNL  Mental Health: Confusion, Anxiety, Depression     Blood: WNL    Objective:     /82 (Patient Site: Right Arm, Patient Position: Sitting, Cuff Size: Adult Regular)   Pulse 80   Resp 16   Ht 5' 5\" (1.651 m)   Wt 151 lb (68.5 kg)   BMI 25.13 kg/m    Wt Readings from Last 5 Encounters:   11/04/19 151 lb (68.5 kg)   09/17/18 148 lb (67.1 kg)   01/11/17 159 lb (72.1 kg)   11/02/16 155 lb (70.3 kg)   02/24/16 145 lb (65.8 kg)        Physical Exam:    GENERAL APPEARANCE: alert, no apparent distress  EYES: no scleral icterus  NECK: no carotid bruits or adenopathy, jugular venous pressure is less than 5 cm  CHEST: symmetric, the lungs are clear to auscultation  CARDIOVASCULAR: regular rhythm without murmur or gallop  EXTREMITIES: no cyanosis, clubbing or edema  SKIN: no xanthelasma  NEUROLOGIC: normal gait and coordination   PSYCHIATRIC: mood and affect are normal    Cardiac Testing:    Echocardiogram:   Results for orders placed during the hospital encounter of 03/10/16   Echo Complete [ECH10] 03/10/2016    Narrative Transthoracic Echocardiography Report (TTE)     Demographics      Patient Name     JERRY SWANN Date of Study         03/10/2016      MRN              [id number]        Room Number      Account Number   [id number]      Accession Number [id number]      Date of Birth    1960       Referring Physician   MARGARITA SMITH MD      Age              55 year(s)       Sonographer           [id number]      Gender           Male             Interpreting          [facility] OUTPATIENT                                     Physician             RAJWINDER RIVERA MD     Procedure    Type of Study      TTE procedure:ECHO COMPLETE.     Procedure Date  Date: 03/10/2016 Start: 10:31 AM    Study Location: [facility]  Technical Quality: Adequate " visualization    Patient Status: Routine    Height: 65 inches Weight: 140 pounds BSA: 1.7 m^2 BMI: 23.3 kg/m^2    HR: 58 bpm BP: 138/78 mmHg    Indications  Cardiac murmur.     Conclusions      Summary   Regular rhythm at 60 beats per minute.   Normal left ventricular cavity size and systolic function.   Left ventricular ejection fraction is visually estimated to be 55%.   Asymmetric left ventricular septal hypertrophy.   Aortic root is borderline enlarged.      Findings      Left Ventricle   Normal left ventricular cavity size and systolic function.   Left ventricular ejection fraction is visually estimated to be 55%.   No LVOT obstruction evident.   Sigmoid septal configuration is noted; this is a normal variant in the   elderly.   Asymmetric left ventricular septal hypertrophy.      Right Ventricle   Normal right ventricular size and systolic function.      Left Atrium   Normal left atrial size.      Right Atrium   Normal right atrial size.      Great Vessels   Aortic root is borderline enlarged.      Aortic Valve   Trileaflet aortic valve with normal function.      Mitral Valve   Structurally normal mitral valve.   Mild mitral regurgitation is present.      Tricuspid Valve   Tricuspid valve is structurally normal.   Mild tricuspid regurgitation.   Normal right ventricular systolic pressure.      Pulmonic Valve   The pulmonic valve is not well visualized.      Pericardial Effusion   No pericardial effusion.     M-Mode/2D Measurements & Calculations      LV Diastolic      LV Systolic Dimension: 3.2 cm   LA Dimension: 3.2 cmAO   Dimension: 4.2 cm LV Volume Diastolic: 82 ml      Root Dimension: 3.7 cm   LV FS:23.8 %      LV Volume Systolic: 35 ml   LV PW Diastolic:  LV EDV/LV EDV Index: 82 ml/48   0.9 cm            m^2LV ESV/LV ESV Index: 35   Septum Diastolic: ml/21 m^2   1.1 cm            EF Calculated: 57.3 %   CO: 4.04 l/min                                    LA/Aorta: 0.86   CI: 2.38 l/m*m^2  LV Length: 7.35  "cm      LV Area           LVOT: 2 cm   Diastolic: 24.6   cm^2   LV Area Systolic:   16.5 cm^2     Doppler Measurements & Calculations      MV Peak E-Wave: 78.5                 LVOT Peak Velocity: 108 cm/s   cm/s                                 LVOT Mean Velocity: 74.5 cm/s   MV Peak A-Wave: 81.4                 LVOT Peak Gradient: 5 mmHgLVOT Mean   cm/s                                 Gradient: 3 mmHg   MV E/A Ratio: 0.96       LVOT VTI:   MV Peak Gradient: 2.46   22.2 cm   mmHg                                        TR Velocity:220 cm/s   MV Deceleration Time:                TR Gradient:19.36 mmHg   141 msec                 E/E': 8.6      MV E' Septal Velocity:   7.41 cm/s   MV E' Lateral Velocity:   9.16 cm/s      Signature      ----------------------------------------------------------------   Electronically signed by RAJWINDER RIVERA MD(Interpreting   physician) on 03/10/2016 05:19 PM   ----------------------------------------------------------------             Results for orders placed during the hospital encounter of 08/21/15   Stress Test Graded [DATOIL06] 08/21/2015    Narrative PRIMARY CARE PHYSICIAN:  Lissette Bar MD    ORDERING PHYSICIAN:  Lissette Bar MD    INDICATION:  Chest pain.    PROCEDURE:  Graded exercise stress test.    STRESS SUMMARY:  The patient exercised for 13 minutes and 0 seconds   according to the  Finn protocol, stopping due to dyspnea and fatigue.  He reported   \\"flashes of mild  chest discomfort\\" during exercise, rated 1/10.  The resting heart rate   was 76 beats  per minute and increased to 156 beats per minute at peak exercise,   achieving 95% of  his maximum predicted heart rate.  The resting blood pressure was 138/86,   reaching  158/78 at peak exercise.  The resting EKG demonstrates normal sinus rhythm   without  ST abnormalities.  There were no ST changes with exercise.  No   stress-induced  arrhythmias.    CONCLUSION:  1.  Very good exercise tolerance for " age.  2.  Very mild chest discomfort noted with exercise.  3.  Stress electrocardiogram was negative for inducible ischemia.      VIJAY GONZALEZ MD  ta  D 08/21/2015 16:54:21  T 08/21/2015 19:22:32  R 08/21/2015 19:22:32  [id number]      cc: RIKKI BAHENA MD             Imaging:    [facility] RADIOLOGY     EXAM: CTA CHEST PE RUN  LOCATION: [facility]  DATE/TIME: 2/12/2019 12:13 PM     INDICATION: Pleurisy, dyspnea with exertion pleurisy, dyspnea with exertion  COMPARISON: None.  TECHNIQUE: Helical acquisition through the chest was performed during the arterial phase of contrast enhancement using IV contrast. 2D and 3D reconstructions were performed by the CT technologist. Dose reduction techniques were used.   IV CONTRAST: Iohexol (Omni) 100 mL     FINDINGS:  ANGIOGRAM CHEST: Pulmonary arteries are normal caliber and negative for pulmonary emboli. Normal caliber thoracic aorta with no dissection or aneurysm. Mild ectasia ascending thoracic aorta measuring 3.8 cm. No aneurysms.     RV/LV RATIO: N/A     LUNGS AND PLEURA: Lungs are clear. Nothing to explain the patient's pain. No pleural effusions.     MEDIASTINUM: Negative. No lymphadenopathy.     LIMITED UPPER ABDOMEN: Negative.     MUSCULOSKELETAL: Negative.     IMPRESSION:   CONCLUSION:  1.  No significant findings on CTA chest to explain the patient's pain.     2.  Mild ectasia ascending thoracic aorta (3.8 cm) but no aneurysms.    Lab Results:    Lab Results   Component Value Date    CREATININE 1.00 12/29/2014     Lab Results   Component Value Date    CHOL 226 (H) 03/10/2016    TRIG 72 03/10/2016    HDL 55 03/10/2016     Creatinine (mg/dL)   Date Value   12/29/2014 1.00     LDL Calculated (mg/dL)   Date Value   03/10/2016 157 (H)     Lab Results   Component Value Date    WBC 5.0 12/29/2014    HGB 14.3 09/12/2018    HCT 46.5 12/29/2014    MCV 92 12/29/2014     12/29/2014           Much or all of the text in this note was generated through  the use of the Dragon Dictate voice-to-text software. Errors in spelling or words which seem out of context are unintentional. Sound alike errors, in particular, may have escaped editing.

## 2021-06-29 ENCOUNTER — HOSPITAL ENCOUNTER (OUTPATIENT)
Dept: CT IMAGING | Facility: CLINIC | Age: 61
Discharge: HOME | End: 2021-06-29
Attending: INTERNAL MEDICINE
Payer: COMMERCIAL

## 2021-06-29 DIAGNOSIS — R07.9 EXERTIONAL CHEST PAIN: ICD-10-CM

## 2021-06-29 LAB
CREAT BLD-MCNC: 1.5 MG/DL (ref 0.7–1.3)
GFR SERPL CREATININE-BSD FRML MDRD: 48 ML/MIN/1.73M2

## 2021-06-29 ASSESSMENT — MIFFLIN-ST. JEOR: SCORE: 1412.28

## 2021-07-04 NOTE — PROGRESS NOTES
Progress Notes by Gretchen Dave MD at 2021 10:50 AM     Author: Gretchen Dave MD Service: -- Author Type: Physician    Filed: 2021 12:13 PM Encounter Date: 2021 Status: Signed    : Gretchen Dave MD (Physician)           Click to link to [facility] NOTE    Thank you, Dr. Gonzales, for asking me to see Kiko Tovar in consultation at [facility] to evaluate chest pain.      Assessment/Plan:   1.  Exertional chest pain: The patient had intermittent chest pain for long time, but now become more associated with exertion, relieved by rest.  He does have a several risk of factors including his age, hypertension, dyslipidemia.  His father  of massive heart attack.  His previous stress echo in 2020 was negative for inducible myocardial ischemia.  After discussion, coronary CT angiogram is requested for evaluation of exertional chest pain to rule out significant coronary artery disease due to multiple risk of factors.  Meantime also evaluate mid ascending aortic aneurysm.  His chest pain also could be related to hypertrophic cardiomyopathy.  If the patient has no significant CAD and stable ascending aortic aneurysm, and then consider cardiac MRI for the next step.    2.  Hypertrophic cardiomyopathy: It is located basal anterior septal segments with small amount of patchy gadolinium enhancement, low risk for sudden cardiac arrest.  The patient is on carvedilol.  Consider cardiac MRI study after coronary CT angiogram.    3.  Essential hypertension: His blood pressure is controlled with carvedilol.    4.  Dyslipidemia: Continue atorvastatin 20 mg at bedtime.    5.  Ascending aortic aneurysm 40 mm: As mentioned above, coronary CT angiogram can evaluate ascending aorta.    Discussed the plan with the patient and his wife, agreed with the plan.  All the questions are answered.    Thank you for the opportunity to be involved in the care of Kiko Tovar.  If you have any questions, please feel free to contact me.  I will see the patient again in 2 months.    Much or all of the text in this note was generated through the use of Dragon Dictate voice-to-text software. Errors in spelling or words which seem out of context are unintentional.   Sound alike errors, in particular, may have escaped editing.       History of Present Illness:   It is my pleasure to see Kiko Tovar at the [facility] clinic for evaluation of Consult. Kiko Tovar is a 61 y.o. male with a medical history of hypertrophic cardiomyopathy, mid ascending aortic aneurysm, essential hypertension, dyslipidemia and anxiety.    The patient presents to cardiology clinic for evaluation of chest pain.  He described his chest pain as located left anterior chest, dull pain, 3-5 over 10 intensity, no radiation, more frequently associated with exertion, relieved by rest in 1 to 2 minutes.  He has noticed that he has intermittent chest pain in the past, but now more associated with exertion.  He has no shortness of breath, palpitations, dizziness, orthopnea, PND or leg edema.  He had a syncopal episode 10 to 15 years ago, not recently.  His blood pressure and heart rate are controlled.    Past Medical History:     Patient Active Problem List   Diagnosis   ? Ascending aortic aneurysm; 4.0 cm by MRA   ? Essential hypertension   ? Adjustment disorder with mixed anxiety and depressed mood       Past Surgical History:     Past Surgical History:   Procedure Laterality Date   ? IR PORT PLACEMENT >5 YEARS     ? IR PORT REMOVAL         Family History:     Family History   Problem Relation Age of Onset   ? Chronic Kidney Disease Mother    ? Hypertension Father    ? Peripheral vascular disease Father 82   ? Dementia Father    ? Other Father 82         one week after a fall at home;  in    ? No Medical Problems Sister    ? Cancer Brother         laryngeal and esophageal cancer   ? No  "Medical Problems Brother    ? No Medical Problems Brother        Social History:    reports that he has never smoked. He has never used smokeless tobacco. He reports current alcohol use.    Review of Systems:   General: WNL  Eyes: WNL  Ears/Nose/Throat: WNL  Lungs: WNL  Heart: Chest Pain  Stomach: Constipation  Bladder: Frequent Urination at Night  Muscle/Joints: Joint Pain  Skin: WNL  Nervous System: WNL  Mental Health: Confusion     Blood: WNL    Meds:     Current Outpatient Medications:   ?  amitriptyline (ELAVIL) 100 MG tablet, Take 100 mg by mouth bedtime., Disp: , Rfl:   ?  aspirin 81 MG EC tablet, Take 81 mg by mouth daily., Disp: , Rfl:   ?  atorvastatin (LIPITOR) 20 MG tablet, Take 1 tablet (20 mg total) by mouth daily., Disp: 30 tablet, Rfl: 0  ?  carvediloL (COREG) 3.125 MG tablet, Take 3.125 mg by mouth 2 (two) times a day with meals., Disp: , Rfl:   ?  DULERA 100-5 mcg/actuation HFAA inhaler, Inhale 1 puff 2 (two) times a day., Disp: , Rfl:   ?  gabapentin (NEURONTIN) 100 MG capsule, Take 100 mg by mouth at bedtime. Take with 600 mg capsule to equal 700 mg at bedtime, Disp: , Rfl:   ?  gabapentin (NEURONTIN) 300 MG capsule, Take 600 mg by mouth at bedtime. Take with 100 mg capsule to equal 700 mg at bedtime   , Disp: , Rfl:   ?  LUTEIN ORAL, Take by mouth daily., Disp: , Rfl:   ?  MAGNESIUM CHLORIDE ORAL, Take by mouth daily., Disp: , Rfl:   ?  melatonin 1 mg Tab tablet, Take 1 mg by mouth at bedtime as needed., Disp: , Rfl:   ?  senna (SENOKOT) 8.6 mg tablet, Take 1 tablet by mouth daily., Disp: , Rfl:   ?  vibegron 75 mg Tab, Take 75 mg by mouth daily., Disp: , Rfl:      Allergies:   Isordil [isosorbide dinitrate]    Objective:      Physical Exam  156 lb (70.8 kg)  5' 5\" (1.651 m)  Body mass index is 25.96 kg/m .  /80 (Patient Site: Left Arm, Patient Position: Sitting, Cuff Size: Adult Large)   Pulse 65   Resp 14   Ht 5' 5\" (1.651 m)   Wt 156 lb (70.8 kg)   BMI 25.96 kg/m      General " Appearance:   Awake, Alert, No acute distress.   HEENT:  Pupil equal, reactive to light. No scleral icterus; the mucous membranes were moist. No oral ulcers or thrush.    Neck: No cervical bruits. No JVD. No thyromegaly. No lymph node enlargement or tenderness.   Chest: The spine was straight. The chest was symmetric.   Lungs:   Respirations unlabored. Lungs are clear to auscultation. No crackles. No wheezing.   Cardiovascular:   RRR, normal first and second heart sounds with no murmurs. No rubs or gallops.    Abdomen:  Soft. No tenderness. Non-distended. Bowels sounds are present   Extremities: Equal posterior tibial pulses. No leg edema.   Skin: No rashes or ulcers. Warm, Dry.   Musculoskeletal: No tenderness. No deformity.   Neurologic: Mood and affect are appropriate. No focal deficits.         EKG:  Personally reivewed  Sinus arrhythmia with poor R wave progression  Otherwise normal ECG  When compared to previous ECG  No significant change    Cardiac Imaging Studies  Exercise stress echo on 2-:    The stress electrocardiogram is negative for inducible ischemic EKG changes.    The patient's exercise tolerance was normal.    Left ventricle ejection fraction is normal. The estimated left ventricular ejection fraction is 65%.    Stress echocardiogram is negative for inducible ischemia.    Blood pressure demonstrated a hypertensive response to exercise.    Chest pain with exercise with no ECG or Echo evidence of ischemia    Mild concentric LVH seen on echo imaging    Stress cardiac MRI on 12-5-2016:  1.  Lexiscan stress cardiac MRI is negative for inducible myocardial ischemia.   2.  Lexiscan stress ECG is negative for inducible myocardial ischemia.  3.  No previous myocardial infarction is identified.  4.  Cardiac MRI images indicated that there is a focal hypertrophic cardiomyopathy at basal anterior septal wall (1.9 cm in thickness) with mild patchy gadolinium enhancement.   5.  Normal left ventricular  size and function. The calculated left ventricular ejection fraction is 62%.  6.  Normal right ventricular size and function.  7.  No obvious valvular disease.  8.  Black blood images indicated mildly enlarged mid ascending aorta, measured 4.0 cm in size     Comments: Based cardiac MRI image findings, there is a low risk of cardiac event from mild patchy enhancement. Hypertrophic cardiomyopathy is also focus in basal anteroseptal wall.    Lab Review   Lab Results   Component Value Date     07/10/2020    K 4.5 07/10/2020     07/10/2020    CO2 26 07/10/2020    BUN 24 (H) 07/10/2020    CREATININE 1.67 (H) 07/10/2020    CALCIUM 9.3 07/10/2020     Lab Results   Component Value Date    WBC 5.0 12/29/2014    HGB 13.5 (L) 07/10/2020    HCT 46.5 12/29/2014    MCV 92 12/29/2014     12/29/2014     Lab Results   Component Value Date    CHOL 226 (H) 03/10/2016    TRIG 72 03/10/2016    HDL 55 03/10/2016

## 2021-07-06 VITALS
HEART RATE: 65 BPM | BODY MASS INDEX: 25.99 KG/M2 | HEIGHT: 65 IN | RESPIRATION RATE: 14 BRPM | WEIGHT: 156 LBS | DIASTOLIC BLOOD PRESSURE: 80 MMHG | SYSTOLIC BLOOD PRESSURE: 124 MMHG

## 2021-07-06 VITALS — BODY MASS INDEX: 24.99 KG/M2 | HEIGHT: 65 IN | WEIGHT: 150 LBS

## 2021-08-04 ENCOUNTER — OFFICE VISIT (OUTPATIENT)
Dept: CARDIOLOGY | Facility: CLINIC | Age: 61
End: 2021-08-04
Payer: COMMERCIAL

## 2021-08-04 VITALS
DIASTOLIC BLOOD PRESSURE: 70 MMHG | RESPIRATION RATE: 16 BRPM | BODY MASS INDEX: 26.33 KG/M2 | OXYGEN SATURATION: 100 % | HEART RATE: 70 BPM | HEIGHT: 65 IN | WEIGHT: 158 LBS | SYSTOLIC BLOOD PRESSURE: 126 MMHG

## 2021-08-04 DIAGNOSIS — I77.810 ASCENDING AORTA DILATION (H): ICD-10-CM

## 2021-08-04 DIAGNOSIS — I42.2 HYPERTROPHIC CARDIOMYOPATHY (H): Primary | ICD-10-CM

## 2021-08-04 DIAGNOSIS — R07.9 CHEST PAIN, UNSPECIFIED TYPE: ICD-10-CM

## 2021-08-04 DIAGNOSIS — I10 ESSENTIAL HYPERTENSION: ICD-10-CM

## 2021-08-04 DIAGNOSIS — I25.10 ATHEROSCLEROSIS OF NATIVE CORONARY ARTERY OF NATIVE HEART WITHOUT ANGINA PECTORIS: ICD-10-CM

## 2021-08-04 DIAGNOSIS — E78.5 DYSLIPIDEMIA, GOAL LDL BELOW 100: ICD-10-CM

## 2021-08-04 PROCEDURE — 99215 OFFICE O/P EST HI 40 MIN: CPT | Performed by: INTERNAL MEDICINE

## 2021-08-04 RX ORDER — MAGNESIUM CHLORIDE
0.5 CRYSTALS MISCELLANEOUS 2 TIMES DAILY
COMMUNITY

## 2021-08-04 RX ORDER — GABAPENTIN 300 MG/1
600 CAPSULE ORAL EVERY EVENING
COMMUNITY

## 2021-08-04 RX ORDER — GABAPENTIN 100 MG/1
100 CAPSULE ORAL EVERY EVENING
COMMUNITY

## 2021-08-04 RX ORDER — AMITRIPTYLINE HYDROCHLORIDE 100 MG/1
100 TABLET ORAL DAILY
COMMUNITY
End: 2021-12-07

## 2021-08-04 RX ORDER — SENNOSIDES A AND B 8.6 MG/1
2 TABLET, FILM COATED ORAL DAILY PRN
COMMUNITY
End: 2023-10-24

## 2021-08-04 RX ORDER — ATORVASTATIN CALCIUM 20 MG/1
20 TABLET, FILM COATED ORAL DAILY
COMMUNITY
End: 2023-10-05

## 2021-08-04 RX ORDER — CARVEDILOL 3.12 MG/1
3.12 TABLET ORAL
COMMUNITY
Start: 2021-06-11 | End: 2021-09-27

## 2021-08-04 ASSESSMENT — MIFFLIN-ST. JEOR: SCORE: 1448.56

## 2021-08-04 NOTE — PROGRESS NOTES
Click to link to [facility]     [facility] HEART CARE NOTE       Assessment/Plan:   1.  Chest pain: The patient's chest pain most likely is secondary to hypertrophic cardiomyopathy.  His coronary CT angiogram was reported minimal coronary artery atherosclerosis, no obstructive lesion.  His GI evaluation was not impressive.  We discussed his coronary CT angiogram findings.  Recommended cardiac MRI for further evaluation of hypertrophic cardiomyopathy.    2.  Hypertrophic cardiomyopathy: It is located basal anterior septal segments with small amount of patchy gadolinium enhancement, low risk for sudden cardiac arrest.  The patient is on carvedilol.    The patient did not have evaluation of for hypertrophic cardiomyopathy since 2015.  Cardiac MRI is requested to reassess hypertrophic cardiomyopathy and also to get risk stratification.     3.  Essential hypertension: His blood pressure is controlled with carvedilol.     4.  Dyslipidemia: Continue atorvastatin 20 mg at bedtime.     5.  Ascending aortic aneurysm 40 mm: Cardiac MRI can reevaluate aortic dilation.  His coronary CTA did not report aortic root size.    Thank you for the opportunity to be involved in the care of Kiko Tovar. If you have any questions, please feel free to contact me.  I will see the patient again in 6 weeks.    Much or all of the text in this note was generated through the use of Dragon Dictate voice-to-text software. Errors in spelling or words which seem out of context are unintentional.   Sound alike errors, in particular, may have escaped editing.       History of Present Illness:   It is my pleasure to see Kiko Tovar at the [facility] for routine cardiology follow up.  Kiko Tovar is a 61 year old male with a medical history of hypertrophic cardiomyopathy, mid ascending aortic aneurysm, essential hypertension, dyslipidemia and anxiety.     The patient was evaluated for intermittent chest  pain by coronary CT angiogram.  He described his chest pain as located left anterior chest, dull pain, 3-5 over 10 intensity, no radiation, more frequently associated with exertion, relieved by rest in 1 to 2 minutes.  The frequency and severity of chest pain has similar to before.  He has no shortness of breath, palpitations, dizziness, orthopnea, PND or leg edema.  He had no syncope over last few years.  His blood pressure and heart rate are controlled.    He had a coronary CT angiogram on 2021 which was a reported total coronary calcium score 35, minimal coronary artery disease, no obstructive lesion.    Past Medical History:     Patient Active Problem List   Diagnosis     Neck pain     Headache     Adjustment disorder with mixed anxiety and depressed mood       Past Surgical History:     Past Surgical History:   Procedure Laterality Date     IR MISCELLANEOUS PROCEDURE  2010     IR PORT PLACEMENT >5 YEARS  2010     IR PORT REMOVAL         Family History:     Family History   Problem Relation Age of Onset     Chronic Kidney Disease Mother      Hypertension Father      Peripheral Vascular Disease Father 82.00     Dementia Father      Other - See Comments Father 82.00         one week after a fall at home;  in      No Known Problems Sister      Cancer Brother         laryngeal and esophageal cancer     No Known Problems Brother      No Known Problems Brother         Social History:    reports that he has never smoked. He has never used smokeless tobacco. He reports current alcohol use.    Review of Systems:        Meds:     Current Outpatient Medications:      amitriptyline (ELAVIL) 100 MG tablet, Take 100 mg by mouth daily, Disp: , Rfl:      aspirin (ASA) 81 MG EC tablet, Take 81 mg by mouth daily, Disp: , Rfl:      atorvastatin (LIPITOR) 20 MG tablet, Take 20 mg by mouth daily, Disp: , Rfl:      carvedilol (COREG) 3.125 MG tablet, Take 3.125 mg by mouth Take 3 tabs in the am and 2  "tabs in the pm, Disp: , Rfl:      cholecalciferol 125 MCG (5000 UT) CAPS, Take 1 capsule by mouth, Disp: , Rfl:      gabapentin (NEURONTIN) 100 MG capsule, Take 100 mg by mouth daily Take 100 mg tab with 2 300mg tabs to total 700mg daily, Disp: , Rfl:      gabapentin (NEURONTIN) 300 MG capsule, Take 600 mg by mouth daily, Disp: , Rfl:      Magnesium Chloride POWD, 0.5 teaspoonful 2 times daily as needed, Disp: , Rfl:      melatonin 1 MG TABS tablet, Take 1 mg by mouth, Disp: , Rfl:      senna (SENOKOT) 8.6 MG tablet, Take 2 tablets by mouth daily as needed, Disp: , Rfl:      UNABLE TO FIND, Take 2 capsules by mouth daily MEDICATION NAME: Super Aloe 250 (250mg), Disp: , Rfl:     Allergies:   Amlodipine and Isordil [isosorbide]      Objective:      Physical Exam  71.7 kg (158 lb)  1.651 m (5' 5\")  Body mass index is 26.29 kg/m .  /70 (BP Location: Left arm, Patient Position: Sitting, Cuff Size: Adult Regular)   Pulse 70   Resp 16   Ht 1.651 m (5' 5\")   Wt 71.7 kg (158 lb)   SpO2 100%   BMI 26.29 kg/m      General Appearance:   Awake, Alert, No acute distress.   HEENT:  Pupil equal and reactive to light. No scleral icterus; the mucous membranes were moist.   Neck: No cervical bruits. No JVD. No thyromegaly.     Chest: The spine was straight. The chest was symmetric.   Lungs:   Respirations unlabored; Lungs are clear to auscultation. No crackles. No wheezing.   Cardiovascular:   Regular rhythm and rate, normal first and second heart sounds with no murmurs. No rubs or gallops.    Abdomen:  Soft. No tenderness. Non-distended. Bowels sounds are present   Extremities: Equal tibial pulses. No leg edema.   Skin: No rashes or ulcers. Warm, Dry.   Musculoskeletal: No tenderness. No deformity.   Neurologic: Mood and affect are appropriate. No focal deficits.         EKG:  Personally reivewed  Sinus arrhythmia with poor R wave progression  Otherwise normal ECG  When compared to previous ECG  No significant " change     Cardiac Imaging Studies  Exercise stress echo on 2-:    The stress electrocardiogram is negative for inducible ischemic EKG changes.    The patient's exercise tolerance was normal.    Left ventricle ejection fraction is normal. The estimated left ventricular ejection fraction is 65%.    Stress echocardiogram is negative for inducible ischemia.    Blood pressure demonstrated a hypertensive response to exercise.    Chest pain with exercise with no ECG or Echo evidence of ischemia    Mild concentric LVH seen on echo imaging     Coronary CTA on 6-:    The total Agatston calcium score is 35. A calcium score in this range places the individual in the 29th percentile when compared to an age and gender matched control group and implies a moderate risk of cardiac events in the next ten years.    Minimal coronary disease with no obstructive plaque identified.    Stress cardiac MRI on 12-5-2016:  1.  Lexiscan stress cardiac MRI is negative for inducible myocardial ischemia.   2.  Lexiscan stress ECG is negative for inducible myocardial ischemia.  3.  No previous myocardial infarction is identified.  4.  Cardiac MRI images indicated that there is a focal hypertrophic cardiomyopathy at basal anterior septal wall (1.9 cm in thickness) with mild patchy gadolinium enhancement.   5.  Normal left ventricular size and function. The calculated left ventricular ejection fraction is 62%.  6.  Normal right ventricular size and function.  7.  No obvious valvular disease.  8.  Black blood images indicated mildly enlarged mid ascending aorta, measured 4.0 cm in size     Comments: Based cardiac MRI image findings, there is a low risk of cardiac event from mild patchy enhancement. Hypertrophic cardiomyopathy is also focus in basal anteroseptal wall.    Lab Review   Lab Results   Component Value Date     07/10/2020     12/01/2019    CO2 26 07/10/2020    CO2 29 12/01/2019    BUN 24 07/10/2020    BUN 19  12/01/2019     Lab Results   Component Value Date    WBC 11.8 12/01/2019    HGB 13.5 07/10/2020    HGB 13.0 12/01/2019    HCT 40.4 12/01/2019    MCV 93 12/01/2019     12/01/2019     Lab Results   Component Value Date    CHOL 226 (H) 03/10/2016     Lab Results   Component Value Date    HDL 55 03/10/2016     No components found for: LDLCALC  Lab Results   Component Value Date    TRIG 72 03/10/2016

## 2021-08-04 NOTE — LETTER
8/4/2021    Lissette Gonzales MD  [facility] [address]  [place] [address]    RE: Kiko Tovar       Dear Colleague,    I had the pleasure of seeing Kiko Tovar in the [facility].        Click to link to [facility]     [facility] HEART CARE NOTE       Assessment/Plan:   1.  Chest pain: The patient's chest pain most likely is secondary to hypertrophic cardiomyopathy.  His coronary CT angiogram was reported minimal coronary artery atherosclerosis, no obstructive lesion.  His GI evaluation was not impressive.  We discussed his coronary CT angiogram findings.  Recommended cardiac MRI for further evaluation of hypertrophic cardiomyopathy.    2.  Hypertrophic cardiomyopathy: It is located basal anterior septal segments with small amount of patchy gadolinium enhancement, low risk for sudden cardiac arrest.  The patient is on carvedilol.    The patient did not have evaluation of for hypertrophic cardiomyopathy since 2015.  Cardiac MRI is requested to reassess hypertrophic cardiomyopathy and also to get risk stratification.     3.  Essential hypertension: His blood pressure is controlled with carvedilol.     4.  Dyslipidemia: Continue atorvastatin 20 mg at bedtime.     5.  Ascending aortic aneurysm 40 mm: Cardiac MRI can reevaluate aortic dilation.  His coronary CTA did not report aortic root size.    Thank you for the opportunity to be involved in the care of Kiko Tovar. If you have any questions, please feel free to contact me.  I will see the patient again in 6 weeks.    Much or all of the text in this note was generated through the use of Dragon Dictate voice-to-text software. Errors in spelling or words which seem out of context are unintentional.   Sound alike errors, in particular, may have escaped editing.       History of Present Illness:   It is my pleasure to see Kiko Tovar at the [facility] clinic for routine cardiology follow up.  Kiko Tovar is a 61 year old male with a medical history of hypertrophic cardiomyopathy, mid ascending aortic aneurysm, essential hypertension, dyslipidemia and anxiety.     The patient was evaluated for intermittent chest pain by coronary CT angiogram.  He described his chest pain as located left anterior chest, dull pain, 3-5 over 10 intensity, no radiation, more frequently associated with exertion, relieved by rest in 1 to 2 minutes.  The frequency and severity of chest pain has similar to before.  He has no shortness of breath, palpitations, dizziness, orthopnea, PND or leg edema.  He had no syncope over last few years.  His blood pressure and heart rate are controlled.    He had a coronary CT angiogram on 2021 which was a reported total coronary calcium score 35, minimal coronary artery disease, no obstructive lesion.    Past Medical History:     Patient Active Problem List   Diagnosis     Neck pain     Headache     Adjustment disorder with mixed anxiety and depressed mood       Past Surgical History:     Past Surgical History:   Procedure Laterality Date     IR MISCELLANEOUS PROCEDURE  2010     IR PORT PLACEMENT >5 YEARS  2010     IR PORT REMOVAL         Family History:     Family History   Problem Relation Age of Onset     Chronic Kidney Disease Mother      Hypertension Father      Peripheral Vascular Disease Father 82.00     Dementia Father      Other - See Comments Father 82.00         one week after a fall at home;  in      No Known Problems Sister      Cancer Brother         laryngeal and esophageal cancer     No Known Problems Brother      No Known Problems Brother         Social History:    reports that he has never smoked. He has never used smokeless tobacco. He reports current alcohol use.    Review of Systems:        Meds:     Current Outpatient Medications:      amitriptyline (ELAVIL) 100 MG tablet, Take 100 mg by mouth  "daily, Disp: , Rfl:      aspirin (ASA) 81 MG EC tablet, Take 81 mg by mouth daily, Disp: , Rfl:      atorvastatin (LIPITOR) 20 MG tablet, Take 20 mg by mouth daily, Disp: , Rfl:      carvedilol (COREG) 3.125 MG tablet, Take 3.125 mg by mouth Take 3 tabs in the am and 2 tabs in the pm, Disp: , Rfl:      cholecalciferol 125 MCG (5000 UT) CAPS, Take 1 capsule by mouth, Disp: , Rfl:      gabapentin (NEURONTIN) 100 MG capsule, Take 100 mg by mouth daily Take 100 mg tab with 2 300mg tabs to total 700mg daily, Disp: , Rfl:      gabapentin (NEURONTIN) 300 MG capsule, Take 600 mg by mouth daily, Disp: , Rfl:      Magnesium Chloride POWD, 0.5 teaspoonful 2 times daily as needed, Disp: , Rfl:      melatonin 1 MG TABS tablet, Take 1 mg by mouth, Disp: , Rfl:      senna (SENOKOT) 8.6 MG tablet, Take 2 tablets by mouth daily as needed, Disp: , Rfl:      UNABLE TO FIND, Take 2 capsules by mouth daily MEDICATION NAME: Super Aloe 250 (250mg), Disp: , Rfl:     Allergies:   Amlodipine and Isordil [isosorbide]      Objective:      Physical Exam  71.7 kg (158 lb)  1.651 m (5' 5\")  Body mass index is 26.29 kg/m .  /70 (BP Location: Left arm, Patient Position: Sitting, Cuff Size: Adult Regular)   Pulse 70   Resp 16   Ht 1.651 m (5' 5\")   Wt 71.7 kg (158 lb)   SpO2 100%   BMI 26.29 kg/m      General Appearance:   Awake, Alert, No acute distress.   HEENT:  Pupil equal and reactive to light. No scleral icterus; the mucous membranes were moist.   Neck: No cervical bruits. No JVD. No thyromegaly.     Chest: The spine was straight. The chest was symmetric.   Lungs:   Respirations unlabored; Lungs are clear to auscultation. No crackles. No wheezing.   Cardiovascular:   Regular rhythm and rate, normal first and second heart sounds with no murmurs. No rubs or gallops.    Abdomen:  Soft. No tenderness. Non-distended. Bowels sounds are present   Extremities: Equal tibial pulses. No leg edema.   Skin: No rashes or ulcers. Warm, Dry. "   Musculoskeletal: No tenderness. No deformity.   Neurologic: Mood and affect are appropriate. No focal deficits.         EKG:  Personally reivewed  Sinus arrhythmia with poor R wave progression  Otherwise normal ECG  When compared to previous ECG  No significant change     Cardiac Imaging Studies  Exercise stress echo on 2-:    The stress electrocardiogram is negative for inducible ischemic EKG changes.    The patient's exercise tolerance was normal.    Left ventricle ejection fraction is normal. The estimated left ventricular ejection fraction is 65%.    Stress echocardiogram is negative for inducible ischemia.    Blood pressure demonstrated a hypertensive response to exercise.    Chest pain with exercise with no ECG or Echo evidence of ischemia    Mild concentric LVH seen on echo imaging     Coronary CTA on 6-:    The total Agatston calcium score is 35. A calcium score in this range places the individual in the 29th percentile when compared to an age and gender matched control group and implies a moderate risk of cardiac events in the next ten years.    Minimal coronary disease with no obstructive plaque identified.    Stress cardiac MRI on 12-5-2016:  1.  Lexiscan stress cardiac MRI is negative for inducible myocardial ischemia.   2.  Lexiscan stress ECG is negative for inducible myocardial ischemia.  3.  No previous myocardial infarction is identified.  4.  Cardiac MRI images indicated that there is a focal hypertrophic cardiomyopathy at basal anterior septal wall (1.9 cm in thickness) with mild patchy gadolinium enhancement.   5.  Normal left ventricular size and function. The calculated left ventricular ejection fraction is 62%.  6.  Normal right ventricular size and function.  7.  No obvious valvular disease.  8.  Black blood images indicated mildly enlarged mid ascending aorta, measured 4.0 cm in size     Comments: Based cardiac MRI image findings, there is a low risk of cardiac event from  mild patchy enhancement. Hypertrophic cardiomyopathy is also focus in basal anteroseptal wall.    Lab Review   Lab Results   Component Value Date     07/10/2020     12/01/2019    CO2 26 07/10/2020    CO2 29 12/01/2019    BUN 24 07/10/2020    BUN 19 12/01/2019     Lab Results   Component Value Date    WBC 11.8 12/01/2019    HGB 13.5 07/10/2020    HGB 13.0 12/01/2019    HCT 40.4 12/01/2019    MCV 93 12/01/2019     12/01/2019     Lab Results   Component Value Date    CHOL 226 (H) 03/10/2016     Lab Results   Component Value Date    HDL 55 03/10/2016     No components found for: LDLCALC  Lab Results   Component Value Date    TRIG 72 03/10/2016                   Thank you for allowing me to participate in the care of your patient.      Sincerely,     Gretchen Dave MD     [facility]  cc:   No referring provider defined for this encounter.

## 2021-09-09 ENCOUNTER — HOSPITAL ENCOUNTER (OUTPATIENT)
Dept: MRI IMAGING | Facility: HOSPITAL | Age: 61
End: 2021-09-09
Attending: INTERNAL MEDICINE
Payer: COMMERCIAL

## 2021-09-09 DIAGNOSIS — I42.2 HYPERTROPHIC CARDIOMYOPATHY (H): ICD-10-CM

## 2021-09-09 PROCEDURE — 255N000002 HC RX 255 OP 636: Performed by: INTERNAL MEDICINE

## 2021-09-09 PROCEDURE — 999N000122 MR MYOCARDIUM  OVERREAD

## 2021-09-09 PROCEDURE — 75561 CARDIAC MRI FOR MORPH W/DYE: CPT

## 2021-09-09 PROCEDURE — 75561 CARDIAC MRI FOR MORPH W/DYE: CPT | Mod: 26 | Performed by: INTERNAL MEDICINE

## 2021-09-09 PROCEDURE — 75565 CARD MRI VELOC FLOW MAPPING: CPT

## 2021-09-09 PROCEDURE — A9585 GADOBUTROL INJECTION: HCPCS | Performed by: INTERNAL MEDICINE

## 2021-09-09 PROCEDURE — 75565 CARD MRI VELOC FLOW MAPPING: CPT | Mod: 26 | Performed by: INTERNAL MEDICINE

## 2021-09-09 RX ORDER — GADOBUTROL 604.72 MG/ML
13 INJECTION INTRAVENOUS ONCE
Status: COMPLETED | OUTPATIENT
Start: 2021-09-09 | End: 2021-09-09

## 2021-09-09 RX ADMIN — GADOBUTROL 13 ML: 604.72 INJECTION INTRAVENOUS at 09:30

## 2021-09-27 ENCOUNTER — OFFICE VISIT (OUTPATIENT)
Dept: CARDIOLOGY | Facility: CLINIC | Age: 61
End: 2021-09-27
Payer: COMMERCIAL

## 2021-09-27 ENCOUNTER — TELEPHONE (OUTPATIENT)
Dept: CARDIOLOGY | Facility: CLINIC | Age: 61
End: 2021-09-27

## 2021-09-27 VITALS
RESPIRATION RATE: 16 BRPM | BODY MASS INDEX: 26.16 KG/M2 | DIASTOLIC BLOOD PRESSURE: 80 MMHG | WEIGHT: 157 LBS | HEIGHT: 65 IN | HEART RATE: 68 BPM | SYSTOLIC BLOOD PRESSURE: 124 MMHG

## 2021-09-27 DIAGNOSIS — I10 ESSENTIAL HYPERTENSION: ICD-10-CM

## 2021-09-27 DIAGNOSIS — I42.2 HYPERTROPHIC CARDIOMYOPATHY (H): Primary | ICD-10-CM

## 2021-09-27 DIAGNOSIS — I25.10 CORONARY ATHEROSCLEROSIS DUE TO CALCIFIED CORONARY LESION: ICD-10-CM

## 2021-09-27 DIAGNOSIS — I42.2 HYPERTROPHIC CARDIOMYOPATHY (H): ICD-10-CM

## 2021-09-27 DIAGNOSIS — E78.5 DYSLIPIDEMIA, GOAL LDL BELOW 100: ICD-10-CM

## 2021-09-27 DIAGNOSIS — I77.810 ASCENDING AORTA DILATION (H): ICD-10-CM

## 2021-09-27 DIAGNOSIS — I25.84 CORONARY ATHEROSCLEROSIS DUE TO CALCIFIED CORONARY LESION: ICD-10-CM

## 2021-09-27 PROCEDURE — 99215 OFFICE O/P EST HI 40 MIN: CPT | Performed by: INTERNAL MEDICINE

## 2021-09-27 RX ORDER — CARVEDILOL 12.5 MG/1
12.5 TABLET ORAL 2 TIMES DAILY WITH MEALS
Qty: 60 TABLET | Refills: 11 | Status: SHIPPED | OUTPATIENT
Start: 2021-09-27 | End: 2021-11-01

## 2021-09-27 RX ORDER — CARVEDILOL 12.5 MG/1
12.5 TABLET ORAL 2 TIMES DAILY WITH MEALS
Qty: 60 TABLET | Refills: 11 | Status: SHIPPED | OUTPATIENT
Start: 2021-09-27 | End: 2021-09-27

## 2021-09-27 ASSESSMENT — MIFFLIN-ST. JEOR: SCORE: 1444.03

## 2021-09-27 NOTE — TELEPHONE ENCOUNTER
"Med resent, with \"take 3 tablets in the AM and 2 tablets in the PM\" removed from instruction.        Pt should be on carvedilol 12.5 mg twice daily per 9/27/21 office visit note:  carvedilol is increased to 12.5 mg twice a day.  If his symptoms is not improved with increasing carvedilol, consider exercise stress echo to evaluate LVOT gradient.   "

## 2021-09-27 NOTE — LETTER
9/27/2021    Lissette Gonzales MD  [facility] [address]  [place] [address]    RE: Kiko Tovar       Dear Colleague,    I had the pleasure of seeing Kiko Tovar in the [facility].              Assessment/Plan:   1.  Hypertrophic cardiomyopathy: He has typical hypertrophic cardiomyopathy patent, thickened basal anterior septum 19 mm in thickness with minimal amount of patchy delayed gadolinium enhancement.  The patient has no other risk of factors of sudden cardiac arrest.  He has no LVOT obstruction at rest imaging scan.  No indication for ICD placement for primary prevention at this time.    He complains of exertional chest pain which could be related to LVOT obstruction when he was doing exercise.  He had coronary CT angiogram recently which was reported minimal calcified coronary atherosclerosis.  No obstructive lesion to cause chest pain.  After discussion, carvedilol is increased to 12.5 mg twice a day.  If his symptoms is not improved with increasing carvedilol, consider exercise stress echo to evaluate LVOT gradient.  The patient agreed with the plan.    2.  Calcified coronary atherosclerosis: The patient has total coronary calcium score 35.  Less likely his chest pain is caused by coronary artery disease.  Continue risk factor modification.  Continue atorvastatin.    3.  Essential hypertension: His blood pressure is controlled with carvedilol.     4.  Dyslipidemia: Continue atorvastatin 20 mg at bedtime.  Check lipid profile at Dr. Gonzales's office.     5.  Ascending aortic aneurysm 40 mm:  Continue to follow-up with.    Thank you for the opportunity to be involved in the care of Kiko Tovar. If you have any questions, please feel free to contact me.  I will see the patient again in 6 months and as needed.    Much or all of the text in this note was generated through the use of Dragon Dictate voice-to-text software.  Errors in spelling or words which seem out of context are unintentional. Sound alike errors, in particular, may have escaped editing.       History of Present Illness:   It is my pleasure to see Kiko Tovar at the [facility] clinic for routine cardiology follow up.  Kiko Tovar is a 61 year old male with a medical history of hypertrophic cardiomyopathy, mildly dilated mid ascending aorta, essential hypertension, dyslipidemia and anxiety.    The patient states that he still has intermittent exertional chest pain.  He describes his chest pain as located upper anterior chest, mild in severity, dull pain, no radiation, typically associated with exertion, relieved by rest in 1 to 2 minutes.  The frequency and severity of his chest pain has been stable.  He has no shortness of breath, palpitations, dizziness, orthopnea, PND or leg edema.  He has been doing exercise with after syncopal episode.  His blood pressure and heart rate are controlled.    He had a cardiac MRI on September 9, 2021 which was reported hypertrophic cardiomyopathy with maximal thickening in basal anterior septal 19 mm, no LVOT obstruction, no other significant findings, less than 1% of myocardial fibrotic tissue/scar.    Past Medical History:     Patient Active Problem List   Diagnosis     Neck pain     Headache     Adjustment disorder with mixed anxiety and depressed mood     Atherosclerosis of native coronary artery of native heart without angina pectoris     Hypertrophic cardiomyopathy (H)     Chest pain, unspecified type     Essential hypertension     Dyslipidemia, goal LDL below 100     Ascending aorta dilation (H)       Past Surgical History:     Past Surgical History:   Procedure Laterality Date     IR MISCELLANEOUS PROCEDURE  1/27/2010     IR PORT PLACEMENT >5 YEARS  2010     IR PORT REMOVAL  2011       Family History:     Family History   Problem Relation Age of Onset     Chronic Kidney Disease Mother       Hypertension Father      Peripheral Vascular Disease Father 82.00     Dementia Father      Other - See Comments Father 82.00         one week after a fall at home;  in      No Known Problems Sister      Cancer Brother         laryngeal and esophageal cancer     No Known Problems Brother      No Known Problems Brother         Social History:    reports that he has never smoked. He has never used smokeless tobacco. He reports current alcohol use.    Review of Systems:   12 systems are reviewed negative except for in HPI.    Meds:     Current Outpatient Medications:      amitriptyline (ELAVIL) 25 MG tablet, Take 75 mg by mouth At Bedtime, Disp: , Rfl:      aspirin (ASA) 81 MG EC tablet, Take 81 mg by mouth daily, Disp: , Rfl:      atorvastatin (LIPITOR) 20 MG tablet, Take 20 mg by mouth daily, Disp: , Rfl:      carvedilol (COREG) 12.5 MG tablet, Take 1 tablet (12.5 mg) by mouth 2 times daily (with meals) Take 3 tabs in the am and 2 tabs in the pm, Disp: 60 tablet, Rfl: 11     gabapentin (NEURONTIN) 100 MG capsule, Take 100 mg by mouth daily Take 100 mg tab with 2 300mg tabs to total 700mg daily, Disp: , Rfl:      gabapentin (NEURONTIN) 300 MG capsule, Take 600 mg by mouth daily, Disp: , Rfl:      Magnesium Chloride POWD, 0.5 teaspoonful 2 times daily as needed, Disp: , Rfl:      melatonin 1 MG TABS tablet, Take 1 mg by mouth, Disp: , Rfl:      senna (SENOKOT) 8.6 MG tablet, Take 2 tablets by mouth daily as needed, Disp: , Rfl:      UNABLE TO FIND, Take 2 capsules by mouth daily MEDICATION NAME: Super Aloe 250 (250mg), Disp: , Rfl:      Vitamin D-Vitamin K (VITAMIN K2-VITAMIN D3 PO), Take 5,000 Units by mouth daily, Disp: , Rfl:      amitriptyline (ELAVIL) 100 MG tablet, Take 100 mg by mouth daily, Disp: , Rfl:      cholecalciferol 125 MCG (5000 UT) CAPS, Take 1 capsule by mouth, Disp: , Rfl:     Allergies:   Amlodipine and Isordil [isosorbide]      Objective:      Physical Exam  71.2 kg (157 lb)  1.651 m  "(5' 5\")  Body mass index is 26.13 kg/m .  /80 (BP Location: Right arm, Patient Position: Sitting, Cuff Size: Adult Regular)   Pulse 68   Resp 16   Ht 1.651 m (5' 5\")   Wt 71.2 kg (157 lb)   BMI 26.13 kg/m      General Appearance:   Awake, Alert, No acute distress.   HEENT:  Pupil equal and reactive to light. No scleral icterus; the mucous membranes were moist.   Neck: No cervical bruits. No JVD. No thyromegaly.     Chest: The spine was straight. The chest was symmetric.   Lungs:   Respirations unlabored; Lungs are clear to auscultation. No crackles. No wheezing.   Cardiovascular:   Regular rhythm and rate, normal first and second heart sounds with no murmurs. No rubs or gallops.    Abdomen:  Soft. No tenderness. Non-distended. Bowels sounds are present   Extremities: Equal tibial pulses. No leg edema.   Skin: No rashes or ulcers. Warm, Dry.   Musculoskeletal: No tenderness. No deformity.   Neurologic: Mood and affect are appropriate. No focal deficits.         EKG:  Personally reivewed  Sinus arrhythmia with poor R wave progression  Otherwise normal ECG  When compared to previous ECG  No significant change     Cardiac Imaging Studies  Exercise stress echo on 2-:    The stress electrocardiogram is negative for inducible ischemic EKG changes.    The patient's exercise tolerance was normal.    Left ventricle ejection fraction is normal. The estimated left ventricular ejection fraction is 65%.    Stress echocardiogram is negative for inducible ischemia.    Blood pressure demonstrated a hypertensive response to exercise.    Chest pain with exercise with no ECG or Echo evidence of ischemia    Mild concentric LVH seen on echo imaging     Coronary CTA on 6-:    The total Agatston calcium score is 35. A calcium score in this range places the individual in the 29th percentile when compared to an age and gender matched control group and implies a moderate risk of cardiac events in the next ten " years.    Minimal coronary disease with no obstructive plaque identified.     Cardiac MRI on 9-9-2021:  1.  Normal left ventricular size and systolic function.  The quantified left ventricular ejection fraction  is 74%. Moderate asymmetric septal hypertrophy of the basal anteroseptal wall with maximal diameter of 19  mm.   Small area of diffuse myocardial fibrosis in the basal anteroseptal wall segment (1% of total myocardium).  No evidence of significant LVOT gradient.  No PATRICIA.   2.  Normal right ventricular size and systolic function.    3.  No significant valvular abnormalities.    Stress cardiac MRI on 12-5-2016:  1.  Lexiscan stress cardiac MRI is negative for inducible myocardial ischemia.   2.  Lexiscan stress ECG is negative for inducible myocardial ischemia.  3.  No previous myocardial infarction is identified.  4.  Cardiac MRI images indicated that there is a focal hypertrophic cardiomyopathy at basal anterior septal wall (1.9 cm in thickness) with mild patchy gadolinium enhancement.   5.  Normal left ventricular size and function. The calculated left ventricular ejection fraction is 62%.  6.  Normal right ventricular size and function.  7.  No obvious valvular disease.  8.  Black blood images indicated mildly enlarged mid ascending aorta, measured 4.0 cm in size     Comments: Based cardiac MRI image findings, there is a low risk of cardiac event from mild patchy enhancement. Hypertrophic cardiomyopathy is also focus in basal anteroseptal wall.    Lab Review   Lab Results   Component Value Date     07/10/2020     12/01/2019    CO2 26 07/10/2020    CO2 29 12/01/2019    BUN 24 07/10/2020    BUN 19 12/01/2019     Lab Results   Component Value Date    WBC 11.8 12/01/2019    HGB 13.5 07/10/2020    HGB 13.0 12/01/2019    HCT 40.4 12/01/2019    MCV 93 12/01/2019     12/01/2019     Lab Results   Component Value Date    CHOL 226 (H) 03/10/2016     Lab Results   Component Value Date    HDL 55  03/10/2016                 Thank you for allowing me to participate in the care of your patient.      Sincerely,     Gretchen Dave MD     [facility]  cc:   No referring provider defined for this encounter.

## 2021-09-27 NOTE — PROGRESS NOTES
Assessment/Plan:   1.  Hypertrophic cardiomyopathy: He has typical hypertrophic cardiomyopathy patent, thickened basal anterior septum 19 mm in thickness with minimal amount of patchy delayed gadolinium enhancement.  The patient has no other risk of factors of sudden cardiac arrest.  He has no LVOT obstruction at rest imaging scan.  No indication for ICD placement for primary prevention at this time.    He complains of exertional chest pain which could be related to LVOT obstruction when he was doing exercise.  He had coronary CT angiogram recently which was reported minimal calcified coronary atherosclerosis.  No obstructive lesion to cause chest pain.  After discussion, carvedilol is increased to 12.5 mg twice a day.  If his symptoms is not improved with increasing carvedilol, consider exercise stress echo to evaluate LVOT gradient.  The patient agreed with the plan.    2.  Calcified coronary atherosclerosis: The patient has total coronary calcium score 35.  Less likely his chest pain is caused by coronary artery disease.  Continue risk factor modification.  Continue atorvastatin.    3.  Essential hypertension: His blood pressure is controlled with carvedilol.     4.  Dyslipidemia: Continue atorvastatin 20 mg at bedtime.  Check lipid profile at Dr. Gonzales's office.     5.  Ascending aortic aneurysm 40 mm:  Continue to follow-up with.    Thank you for the opportunity to be involved in the care of Kiko Tovar. If you have any questions, please feel free to contact me.  I will see the patient again in 6 months and as needed.    Much or all of the text in this note was generated through the use of Dragon Dictate voice-to-text software. Errors in spelling or words which seem out of context are unintentional. Sound alike errors, in particular, may have escaped editing.       History of Present Illness:   It is my pleasure to see Kiko HUE CeeLa at the [facility] for routine  cardiology follow up.  Kiko Tovar is a 61 year old male with a medical history of hypertrophic cardiomyopathy, mildly dilated mid ascending aorta, essential hypertension, dyslipidemia and anxiety.    The patient states that he still has intermittent exertional chest pain.  He describes his chest pain as located upper anterior chest, mild in severity, dull pain, no radiation, typically associated with exertion, relieved by rest in 1 to 2 minutes.  The frequency and severity of his chest pain has been stable.  He has no shortness of breath, palpitations, dizziness, orthopnea, PND or leg edema.  He has been doing exercise with after syncopal episode.  His blood pressure and heart rate are controlled.    He had a cardiac MRI on 2021 which was reported hypertrophic cardiomyopathy with maximal thickening in basal anterior septal 19 mm, no LVOT obstruction, no other significant findings, less than 1% of myocardial fibrotic tissue/scar.    Past Medical History:     Patient Active Problem List   Diagnosis     Neck pain     Headache     Adjustment disorder with mixed anxiety and depressed mood     Atherosclerosis of native coronary artery of native heart without angina pectoris     Hypertrophic cardiomyopathy (H)     Chest pain, unspecified type     Essential hypertension     Dyslipidemia, goal LDL below 100     Ascending aorta dilation (H)       Past Surgical History:     Past Surgical History:   Procedure Laterality Date     IR MISCELLANEOUS PROCEDURE  2010     IR PORT PLACEMENT >5 YEARS  2010     IR PORT REMOVAL         Family History:     Family History   Problem Relation Age of Onset     Chronic Kidney Disease Mother      Hypertension Father      Peripheral Vascular Disease Father 82.00     Dementia Father      Other - See Comments Father 82.00         one week after a fall at home;  in      No Known Problems Sister      Cancer Brother         laryngeal and esophageal cancer     No  "Known Problems Brother      No Known Problems Brother         Social History:    reports that he has never smoked. He has never used smokeless tobacco. He reports current alcohol use.    Review of Systems:   12 systems are reviewed negative except for in HPI.    Meds:     Current Outpatient Medications:      amitriptyline (ELAVIL) 25 MG tablet, Take 75 mg by mouth At Bedtime, Disp: , Rfl:      aspirin (ASA) 81 MG EC tablet, Take 81 mg by mouth daily, Disp: , Rfl:      atorvastatin (LIPITOR) 20 MG tablet, Take 20 mg by mouth daily, Disp: , Rfl:      carvedilol (COREG) 12.5 MG tablet, Take 1 tablet (12.5 mg) by mouth 2 times daily (with meals) Take 3 tabs in the am and 2 tabs in the pm, Disp: 60 tablet, Rfl: 11     gabapentin (NEURONTIN) 100 MG capsule, Take 100 mg by mouth daily Take 100 mg tab with 2 300mg tabs to total 700mg daily, Disp: , Rfl:      gabapentin (NEURONTIN) 300 MG capsule, Take 600 mg by mouth daily, Disp: , Rfl:      Magnesium Chloride POWD, 0.5 teaspoonful 2 times daily as needed, Disp: , Rfl:      melatonin 1 MG TABS tablet, Take 1 mg by mouth, Disp: , Rfl:      senna (SENOKOT) 8.6 MG tablet, Take 2 tablets by mouth daily as needed, Disp: , Rfl:      UNABLE TO FIND, Take 2 capsules by mouth daily MEDICATION NAME: Super Aloe 250 (250mg), Disp: , Rfl:      Vitamin D-Vitamin K (VITAMIN K2-VITAMIN D3 PO), Take 5,000 Units by mouth daily, Disp: , Rfl:      amitriptyline (ELAVIL) 100 MG tablet, Take 100 mg by mouth daily, Disp: , Rfl:      cholecalciferol 125 MCG (5000 UT) CAPS, Take 1 capsule by mouth, Disp: , Rfl:     Allergies:   Amlodipine and Isordil [isosorbide]      Objective:      Physical Exam  71.2 kg (157 lb)  1.651 m (5' 5\")  Body mass index is 26.13 kg/m .  /80 (BP Location: Right arm, Patient Position: Sitting, Cuff Size: Adult Regular)   Pulse 68   Resp 16   Ht 1.651 m (5' 5\")   Wt 71.2 kg (157 lb)   BMI 26.13 kg/m      General Appearance:   Awake, Alert, No acute distress. "   HEENT:  Pupil equal and reactive to light. No scleral icterus; the mucous membranes were moist.   Neck: No cervical bruits. No JVD. No thyromegaly.     Chest: The spine was straight. The chest was symmetric.   Lungs:   Respirations unlabored; Lungs are clear to auscultation. No crackles. No wheezing.   Cardiovascular:   Regular rhythm and rate, normal first and second heart sounds with no murmurs. No rubs or gallops.    Abdomen:  Soft. No tenderness. Non-distended. Bowels sounds are present   Extremities: Equal tibial pulses. No leg edema.   Skin: No rashes or ulcers. Warm, Dry.   Musculoskeletal: No tenderness. No deformity.   Neurologic: Mood and affect are appropriate. No focal deficits.         EKG:  Personally reivewed  Sinus arrhythmia with poor R wave progression  Otherwise normal ECG  When compared to previous ECG  No significant change     Cardiac Imaging Studies  Exercise stress echo on 2-:    The stress electrocardiogram is negative for inducible ischemic EKG changes.    The patient's exercise tolerance was normal.    Left ventricle ejection fraction is normal. The estimated left ventricular ejection fraction is 65%.    Stress echocardiogram is negative for inducible ischemia.    Blood pressure demonstrated a hypertensive response to exercise.    Chest pain with exercise with no ECG or Echo evidence of ischemia    Mild concentric LVH seen on echo imaging     Coronary CTA on 6-:    The total Agatston calcium score is 35. A calcium score in this range places the individual in the 29th percentile when compared to an age and gender matched control group and implies a moderate risk of cardiac events in the next ten years.    Minimal coronary disease with no obstructive plaque identified.     Cardiac MRI on 9-9-2021:  1.  Normal left ventricular size and systolic function.  The quantified left ventricular ejection fraction  is 74%. Moderate asymmetric septal hypertrophy of the basal anteroseptal  wall with maximal diameter of 19  mm.   Small area of diffuse myocardial fibrosis in the basal anteroseptal wall segment (1% of total myocardium).  No evidence of significant LVOT gradient.  No PATRICIA.   2.  Normal right ventricular size and systolic function.    3.  No significant valvular abnormalities.    Stress cardiac MRI on 12-5-2016:  1.  Lexiscan stress cardiac MRI is negative for inducible myocardial ischemia.   2.  Lexiscan stress ECG is negative for inducible myocardial ischemia.  3.  No previous myocardial infarction is identified.  4.  Cardiac MRI images indicated that there is a focal hypertrophic cardiomyopathy at basal anterior septal wall (1.9 cm in thickness) with mild patchy gadolinium enhancement.   5.  Normal left ventricular size and function. The calculated left ventricular ejection fraction is 62%.  6.  Normal right ventricular size and function.  7.  No obvious valvular disease.  8.  Black blood images indicated mildly enlarged mid ascending aorta, measured 4.0 cm in size     Comments: Based cardiac MRI image findings, there is a low risk of cardiac event from mild patchy enhancement. Hypertrophic cardiomyopathy is also focus in basal anteroseptal wall.    Lab Review   Lab Results   Component Value Date     07/10/2020     12/01/2019    CO2 26 07/10/2020    CO2 29 12/01/2019    BUN 24 07/10/2020    BUN 19 12/01/2019     Lab Results   Component Value Date    WBC 11.8 12/01/2019    HGB 13.5 07/10/2020    HGB 13.0 12/01/2019    HCT 40.4 12/01/2019    MCV 93 12/01/2019     12/01/2019     Lab Results   Component Value Date    CHOL 226 (H) 03/10/2016     Lab Results   Component Value Date    HDL 55 03/10/2016

## 2021-09-27 NOTE — TELEPHONE ENCOUNTER
----- Message from Len ARZATE Cortés sent at 9/27/2021  2:15 PM CDT -----  Regarding: Jolie pt  General phone call:    Caller: pharmacy  Primary cardiologist: Joile  Detailed reason for call: pharmacy wants a clarification on medication on the directions on how to take the medication     Best phone number: [phone]  Best time to contact: any  Ok to leave a detailedmessage? yes  Device? no    Additional Info:

## 2021-10-03 ENCOUNTER — HEALTH MAINTENANCE LETTER (OUTPATIENT)
Age: 61
End: 2021-10-03

## 2021-11-01 ENCOUNTER — TELEPHONE (OUTPATIENT)
Dept: CARDIOLOGY | Facility: CLINIC | Age: 61
End: 2021-11-01

## 2021-11-01 DIAGNOSIS — I42.2 HYPERTROPHIC CARDIOMYOPATHY (H): Primary | ICD-10-CM

## 2021-11-01 DIAGNOSIS — I42.2 HYPERTROPHIC CARDIOMYOPATHY (H): ICD-10-CM

## 2021-11-01 RX ORDER — CARVEDILOL 12.5 MG/1
12.5 TABLET ORAL 2 TIMES DAILY WITH MEALS
Qty: 180 TABLET | Refills: 2 | Status: SHIPPED | OUTPATIENT
Start: 2021-11-01 | End: 2022-02-09

## 2021-11-01 RX ORDER — CARVEDILOL 3.12 MG/1
3.12 TABLET ORAL 2 TIMES DAILY WITH MEALS
Qty: 180 TABLET | Refills: 0 | Status: SHIPPED | OUTPATIENT
Start: 2021-11-01 | End: 2022-02-09

## 2021-11-01 NOTE — TELEPHONE ENCOUNTER
----- Message from Liz Ortiz sent at 11/1/2021  2:59 PM CDT -----  Regarding: [facility] pt  General phone call:    Caller: Kiko  Primary cardiologist: TITA  Detailed reason for call: Pt is calling and states that the carvedilol is not helping his symptoms  New or active symptoms? N/A  Best phone number: [phone]   Best time to contact: anytime  Ok to leave a detailedmessage? yes  Device? no    Additional Info:

## 2021-11-01 NOTE — TELEPHONE ENCOUNTER
Spoke with pt and has chest discomfort with activity. He started Coreg 12.5 mg bid on 9/27 so has been on more than 30 days. No SOB or sweating. No pain at rest. Will update Dr. Dave.

## 2021-11-01 NOTE — TELEPHONE ENCOUNTER
Gretchen Dave MD Decker, Susan M RN  Caller: Unspecified (Today,  3:19 PM)  Tried to add carvedilol 3.125 mg bid to carvedilol 12.5 mg bid.   Thanks   Robert   Spoke with pt and will increase dose with 3.125 and 12.5 bid. Will send script to local pharm. Pt will call in one week to update or sooner if things get worse.

## 2021-11-08 ENCOUNTER — OFFICE VISIT (OUTPATIENT)
Dept: SURGERY | Facility: CLINIC | Age: 61
End: 2021-11-08
Payer: COMMERCIAL

## 2021-11-08 VITALS — WEIGHT: 155 LBS | BODY MASS INDEX: 25.83 KG/M2 | HEIGHT: 65 IN

## 2021-11-08 DIAGNOSIS — R22.32 FOREARM MASS, LEFT: Primary | ICD-10-CM

## 2021-11-08 PROCEDURE — 99203 OFFICE O/P NEW LOW 30 MIN: CPT | Performed by: SPECIALIST

## 2021-11-08 ASSESSMENT — MIFFLIN-ST. JEOR: SCORE: 1434.96

## 2021-11-08 NOTE — LETTER
"    11/8/2021         RE: Kiko Tovar  [address]  [facility] [address]        Dear Colleague,    Thank you for referring your patient, Kiko Tovar, to the [facility] SURGERY CLINIC AND BARIATRICS CARE [place]. Please see a copy of my visit note below.        [facility] Surgery Consult    HPI:    61 year old year old male who I have been consulted by Lissette Gonzales for evaluation of New Patient (no pain lipoma)    Mass of left arm it is pretty large. It is fixed and not mobile. Has been there for years and has not had anything done or biopsied. Starting to inhibits movement and activities.     Allergies:Amlodipine and Isordil [isosorbide]    Past Medical History:   Diagnosis Date     ASHLEY positive 12/29/2014     Ascending aortic aneurysm (H) 12/06/2016    3.8 cm by CTA March 12, 2019     Asymmetric septal hypertrophy (H) 01/11/2017    Mild by MRI; no further workup needed per Dr. Gardner.\"The findings indicates a focal hypertrophic cardiomyopathy in basal anteroseptal wall (1.9 cm).\"     Coronary atherosclerosis due to calcified coronary lesion 2/11/2016     Dyslipidemia, goal LDL below 100 6/2/2021     Essential hypertension 1/11/2017     GE reflux      Lyme disease 2010    he had an iv port to treat Lyme disease wt chronic antibiotics for six months by Dr. Lilian Alexander at [facility]       Past Surgical History:   Procedure Laterality Date     IR MISCELLANEOUS PROCEDURE  1/27/2010     IR PORT PLACEMENT >5 YEARS  2010     IR PORT REMOVAL  2011       CURRENT MEDS:  Current Outpatient Medications   Medication     amitriptyline (ELAVIL) 25 MG tablet     aspirin (ASA) 81 MG EC tablet     atorvastatin (LIPITOR) 20 MG tablet     carvedilol (COREG) 12.5 MG tablet     carvedilol (COREG) 3.125 MG tablet     gabapentin (NEURONTIN) 100 MG capsule     gabapentin (NEURONTIN) 300 MG capsule     Magnesium Chloride POWD     melatonin 1 MG TABS tablet     senna (SENOKOT) " "8.6 MG tablet     UNABLE TO FIND     Vitamin D-Vitamin K (VITAMIN K2-VITAMIN D3 PO)     amitriptyline (ELAVIL) 100 MG tablet     cholecalciferol 125 MCG (5000 UT) CAPS     No current facility-administered medications for this visit.       Family History   Problem Relation Age of Onset     Chronic Kidney Disease Mother      Hypertension Father      Peripheral Vascular Disease Father 82.00     Dementia Father      Other - See Comments Father 82.00         one week after a fall at home;  in      No Known Problems Sister      Cancer Brother         laryngeal and esophageal cancer     No Known Problems Brother      No Known Problems Brother         reports that he has never smoked. He has never used smokeless tobacco. He reports current alcohol use.    Review of Systems:  Negative except mass on arm    OBJECTIVE:  Vitals:    21 1106   Weight: 70.3 kg (155 lb)   Height: 1.651 m (5' 5\")     Body mass index is 25.79 kg/m .    EXAM:  GENERAL: This is a well-developed 61 year old male who appears his stated age  HEAD: normocephalic  HEENT: Pupils equal and reactive bilaterally  MOUTH: mucus membranes intact  CARDIAC: RRR without murmur  CHEST/LUNG:  Clear to auscultation  ABDOMEN: Soft, nontender, nondistended, no masses  EXTREMITIES: left arm mass 4x5 cm at least fixed and not mobile  NEUROLOGIC: Focally intact, nonfocal  INTEGUMENT: No open lesions or ulcers  VASCULAR: Pulses intact, symmetrical upper and lower extremities.        LABS:  Lab Results   Component Value Date    WBC 11.8 2019    HGB 13.5 07/10/2020    HGB 13.0 2019    HCT 40.4 2019    MCV 93 2019     2019     INR/Prothrombin Time  @LABRCNTIP(NA,K,CL,co2,bun,creatinine,labglom,glucose,calcium)@  No results found for: HGBA1C  Lab Results   Component Value Date    ALT 32 2019    AST 24 2019    ALKPHOS 130 2019        Images: pending    Assessment/Plan:   Left arm mass    I am little worried " about this especially since it is fixed very large the indolent nature is good but I think he needs an MRI of this mass to figures out whether we could even attempt to do it here or secondary to the sarcoma clinic at [facility].  Await his study.    No follow-ups on file.    Owen Garcia MD  General Surgery [phone]  Vascular Surgery [phone]          Again, thank you for allowing me to participate in the care of your patient.        Sincerely,        Owen Garcia MD

## 2021-11-22 NOTE — PROGRESS NOTES
"    [facility] Surgery Consult    HPI:    61 year old year old male who I have been consulted by Lissette Gonzales for evaluation of New Patient (no pain lipoma)    Mass of left arm it is pretty large. It is fixed and not mobile. Has been there for years and has not had anything done or biopsied. Starting to inhibits movement and activities.     Allergies:Amlodipine and Isordil [isosorbide]    Past Medical History:   Diagnosis Date     ASHLEY positive 12/29/2014     Ascending aortic aneurysm (H) 12/06/2016    3.8 cm by CTA March 12, 2019     Asymmetric septal hypertrophy (H) 01/11/2017    Mild by MRI; no further workup needed per Dr. Gardner.\"The findings indicates a focal hypertrophic cardiomyopathy in basal anteroseptal wall (1.9 cm).\"     Coronary atherosclerosis due to calcified coronary lesion 2/11/2016     Dyslipidemia, goal LDL below 100 6/2/2021     Essential hypertension 1/11/2017     GE reflux      Lyme disease 2010    he had an iv port to treat Lyme disease wt chronic antibiotics for six months by Dr. Lilian Alexander at [facility]       Past Surgical History:   Procedure Laterality Date     IR MISCELLANEOUS PROCEDURE  1/27/2010     IR PORT PLACEMENT >5 YEARS  2010     IR PORT REMOVAL  2011       CURRENT MEDS:  Current Outpatient Medications   Medication     amitriptyline (ELAVIL) 25 MG tablet     aspirin (ASA) 81 MG EC tablet     atorvastatin (LIPITOR) 20 MG tablet     carvedilol (COREG) 12.5 MG tablet     carvedilol (COREG) 3.125 MG tablet     gabapentin (NEURONTIN) 100 MG capsule     gabapentin (NEURONTIN) 300 MG capsule     Magnesium Chloride POWD     melatonin 1 MG TABS tablet     senna (SENOKOT) 8.6 MG tablet     UNABLE TO FIND     Vitamin D-Vitamin K (VITAMIN K2-VITAMIN D3 PO)     amitriptyline (ELAVIL) 100 MG tablet     cholecalciferol 125 MCG (5000 UT) CAPS     No current facility-administered medications for this visit.       Family History   Problem Relation Age of Onset " "    Chronic Kidney Disease Mother      Hypertension Father      Peripheral Vascular Disease Father 82.00     Dementia Father      Other - See Comments Father 82.00         one week after a fall at home;  in      No Known Problems Sister      Cancer Brother         laryngeal and esophageal cancer     No Known Problems Brother      No Known Problems Brother         reports that he has never smoked. He has never used smokeless tobacco. He reports current alcohol use.    Review of Systems:  Negative except mass on arm    OBJECTIVE:  Vitals:    21 1106   Weight: 70.3 kg (155 lb)   Height: 1.651 m (5' 5\")     Body mass index is 25.79 kg/m .    EXAM:  GENERAL: This is a well-developed 61 year old male who appears his stated age  HEAD: normocephalic  HEENT: Pupils equal and reactive bilaterally  MOUTH: mucus membranes intact  CARDIAC: RRR without murmur  CHEST/LUNG:  Clear to auscultation  ABDOMEN: Soft, nontender, nondistended, no masses  EXTREMITIES: left arm mass 4x5 cm at least fixed and not mobile  NEUROLOGIC: Focally intact, nonfocal  INTEGUMENT: No open lesions or ulcers  VASCULAR: Pulses intact, symmetrical upper and lower extremities.        LABS:  Lab Results   Component Value Date    WBC 11.8 2019    HGB 13.5 07/10/2020    HGB 13.0 2019    HCT 40.4 2019    MCV 93 2019     2019     INR/Prothrombin Time  @LABRCNTIP(NA,K,CL,co2,bun,creatinine,labglom,glucose,calcium)@  No results found for: HGBA1C  Lab Results   Component Value Date    ALT 32 2019    AST 24 2019    ALKPHOS 130 2019        Images: pending    Assessment/Plan:   Left arm mass    I am little worried about this especially since it is fixed very large the indolent nature is good but I think he needs an MRI of this mass to figures out whether we could even attempt to do it here or secondary to the sarcoma clinic at [facility].  Await his study.    No follow-ups on file.    Owen" Radha Garcia MD  General Surgery [phone]  Vascular Surgery [phone]

## 2021-11-23 ENCOUNTER — HOSPITAL ENCOUNTER (OUTPATIENT)
Dept: MRI IMAGING | Facility: CLINIC | Age: 61
Discharge: HOME | End: 2021-11-23
Attending: SPECIALIST | Admitting: SPECIALIST
Payer: COMMERCIAL

## 2021-11-23 DIAGNOSIS — R22.32 FOREARM MASS, LEFT: ICD-10-CM

## 2021-11-23 PROCEDURE — 73218 MRI UPPER EXTREMITY W/O DYE: CPT | Mod: LT

## 2021-11-23 RX ORDER — GADOBUTROL 604.72 MG/ML
7 INJECTION INTRAVENOUS ONCE
Status: DISCONTINUED | OUTPATIENT
Start: 2021-11-23 | End: 2021-11-23 | Stop reason: CLARIF

## 2021-11-24 DIAGNOSIS — C49.9 SARCOMA OF SOFT TISSUE (H): Primary | ICD-10-CM

## 2021-11-24 NOTE — PROGRESS NOTES
Referral placed to [facility] Oncology Sarcoma Clinic. Faxed referral and notes to [phone]. Pt to be contacted to set this appointment up.

## 2021-11-26 ENCOUNTER — TELEPHONE (OUTPATIENT)
Dept: ORTHOPEDICS | Facility: CLINIC | Age: 61
End: 2021-11-26
Payer: COMMERCIAL

## 2021-11-26 ENCOUNTER — TELEPHONE (OUTPATIENT)
Dept: SURGERY | Facility: CLINIC | Age: 61
End: 2021-11-26
Payer: COMMERCIAL

## 2021-11-26 NOTE — TELEPHONE ENCOUNTER
Patient has a referral to Ortho Oncology regarding Sarcoma Clinic; Left forearm mass. Lipoma vs. Sarcoma. Please assist patient with scheduling appt at [phone] within appropriate time frame. Thank you for your help!

## 2021-11-26 NOTE — TELEPHONE ENCOUNTER
Called patient to set up new tumor appt with Dr. Mullins on 12/7. Patient understood and is agreeable with the plan.    Gentry Romero, EMT on 11/26/2021 at 4:40 PM

## 2021-11-26 NOTE — TELEPHONE ENCOUNTER
Patient calling to talk to aYni or Erika regarding the appt you are helping schedule with he [facility].    Thanks!

## 2021-11-29 NOTE — TELEPHONE ENCOUNTER
Spoke to patient. He is scheduled at the [facility] 12/7. He expressed understanding.       [facility]      Erika Christianson RN  [facility]  General Surgery  [address] [address]  [email or web address]   Office:[phone]  Employed by [facility] [employer],

## 2021-12-07 ENCOUNTER — VIRTUAL VISIT (OUTPATIENT)
Dept: ORTHOPEDICS | Facility: CLINIC | Age: 61
End: 2021-12-07
Attending: SPECIALIST
Payer: COMMERCIAL

## 2021-12-07 DIAGNOSIS — C49.9 SARCOMA OF SOFT TISSUE (H): ICD-10-CM

## 2021-12-07 PROCEDURE — 99202 OFFICE O/P NEW SF 15 MIN: CPT | Mod: 95 | Performed by: ORTHOPAEDIC SURGERY

## 2021-12-07 NOTE — PROGRESS NOTES
Patient is a 61-year-old who reports a left forearm mass.  This is not necessarily bothering him other than the presence of the mass.    I reviewed his past history which is extensive and is outlined in his electronic health record.  Have also reviewed his medication which is extensive as well.    On video he does have an obvious mass in the proximal and radial aspect of his left forearm with his arms extended.    Reviewed his MRI scan myself I also reviewed the MRI report the findings are most consistent with a intramuscular lipoma.  There was some question the radiology report about a haziness.  I did review the report I think it could be an atypical lipoma though I do not see convincing evidence of this.    Impression: 8 cm lipoma left proximal forearm.    Plan: I reviewed the options of surgical excision versus observation.  He prefers the latter.  He will contact us in 1 year if he would like to have the limb reimaged.    I removed his imaging from 1:22 PM to 1:29 PM.  The video interaction documentation was 1:29 PM to 1:40 PM total visit 18 minutes

## 2021-12-07 NOTE — NURSING NOTE
Chief Complaint   Patient presents with     Consult     consulting left forearm mass referral by Dr. Garcia // first noticed mass 7-10 years ago // it got a little bigger per pt        61 year old  1960         Pain Assessment  Patient Currently in Pain: Adolfoies             [id number] - [place], [place] - [address] [place] - [address]        Allergies   Allergen Reactions     Amlodipine      Edema in feet     Isordil [Isosorbide] Headache           Current Outpatient Medications   Medication     amitriptyline (ELAVIL) 100 MG tablet     amitriptyline (ELAVIL) 25 MG tablet     aspirin (ASA) 81 MG EC tablet     atorvastatin (LIPITOR) 20 MG tablet     carvedilol (COREG) 12.5 MG tablet     carvedilol (COREG) 3.125 MG tablet     cholecalciferol 125 MCG (5000 UT) CAPS     gabapentin (NEURONTIN) 100 MG capsule     gabapentin (NEURONTIN) 300 MG capsule     Magnesium Chloride POWD     melatonin 1 MG TABS tablet     senna (SENOKOT) 8.6 MG tablet     UNABLE TO FIND     Vitamin D-Vitamin K (VITAMIN K2-VITAMIN D3 PO)     No current facility-administered medications for this visit.

## 2021-12-07 NOTE — LETTER
12/7/2021         RE: Kiko Tovar  [address] [address]   [place] [address]        Dear Colleague,    Thank you for referring your patient, Kiko Tovar, to the [facility] ORTHOPEDIC CLINIC [place]. Please see a copy of my visit note below.    Patient is a 61-year-old who reports a left forearm mass.  This is not necessarily bothering him other than the presence of the mass.    I reviewed his past history which is extensive and is outlined in his electronic health record.  Have also reviewed his medication which is extensive as well.    On video he does have an obvious mass in the proximal and radial aspect of his left forearm with his arms extended.    Reviewed his MRI scan myself I also reviewed the MRI report the findings are most consistent with a intramuscular lipoma.  There was some question the radiology report about a haziness.  I did review the report I think it could be an atypical lipoma though I do not see convincing evidence of this.    Impression: 8 cm lipoma left proximal forearm.    Plan: I reviewed the options of surgical excision versus observation.  He prefers the latter.  He will contact us in 1 year if he would like to have the limb reimaged.    I removed his imaging from 1:22 PM to 1:29 PM.  The video interaction documentation was 1:29 PM to 1:40 PM total visit 18 minutes            Gasper Mullins MD

## 2022-02-09 DIAGNOSIS — I42.2 HYPERTROPHIC CARDIOMYOPATHY (H): ICD-10-CM

## 2022-02-09 RX ORDER — CARVEDILOL 12.5 MG/1
12.5 TABLET ORAL 2 TIMES DAILY WITH MEALS
Qty: 180 TABLET | Refills: 0 | Status: SHIPPED | OUTPATIENT
Start: 2022-02-09 | End: 2022-05-10

## 2022-02-09 RX ORDER — CARVEDILOL 3.12 MG/1
3.12 TABLET ORAL 2 TIMES DAILY WITH MEALS
Qty: 180 TABLET | Refills: 0 | Status: SHIPPED | OUTPATIENT
Start: 2022-02-09 | End: 2022-05-09

## 2022-03-30 ENCOUNTER — OFFICE VISIT (OUTPATIENT)
Dept: CARDIOLOGY | Facility: CLINIC | Age: 62
End: 2022-03-30
Payer: COMMERCIAL

## 2022-03-30 VITALS
SYSTOLIC BLOOD PRESSURE: 104 MMHG | RESPIRATION RATE: 12 BRPM | WEIGHT: 160 LBS | BODY MASS INDEX: 26.63 KG/M2 | HEART RATE: 64 BPM | DIASTOLIC BLOOD PRESSURE: 70 MMHG

## 2022-03-30 DIAGNOSIS — R07.9 CHEST PAIN, UNSPECIFIED TYPE: ICD-10-CM

## 2022-03-30 DIAGNOSIS — I42.2 HYPERTROPHIC CARDIOMYOPATHY (H): Primary | ICD-10-CM

## 2022-03-30 DIAGNOSIS — I25.85 CARDIAC MICROVASCULAR DISEASE: ICD-10-CM

## 2022-03-30 DIAGNOSIS — I25.10 CORONARY ATHEROSCLEROSIS DUE TO CALCIFIED CORONARY LESION: ICD-10-CM

## 2022-03-30 DIAGNOSIS — E78.5 DYSLIPIDEMIA, GOAL LDL BELOW 100: ICD-10-CM

## 2022-03-30 DIAGNOSIS — I10 ESSENTIAL HYPERTENSION: ICD-10-CM

## 2022-03-30 DIAGNOSIS — I25.84 CORONARY ATHEROSCLEROSIS DUE TO CALCIFIED CORONARY LESION: ICD-10-CM

## 2022-03-30 PROCEDURE — 99215 OFFICE O/P EST HI 40 MIN: CPT | Performed by: INTERNAL MEDICINE

## 2022-03-30 RX ORDER — AMITRIPTYLINE HYDROCHLORIDE 50 MG/1
50 TABLET ORAL AT BEDTIME
COMMUNITY
End: 2023-10-05

## 2022-03-30 RX ORDER — ISOSORBIDE MONONITRATE 30 MG/1
30 TABLET, EXTENDED RELEASE ORAL DAILY
Qty: 30 TABLET | Refills: 1 | Status: SHIPPED | OUTPATIENT
Start: 2022-03-30 | End: 2022-06-03

## 2022-03-30 NOTE — PROGRESS NOTES
Assessment/Plan:   1.  Hypertrophic cardiomyopathy: His cardiac MRI demonstrated basal anterior septum 19 mm in thickness with minimal amount of patchy delayed gadolinium enhancement.  The patient has no other risk of factors of sudden cardiac arrest.  He has no LVOT obstruction at rest imaging scan.  No indication for ICD placement for primary prevention at this time.    He complains of exertional chest pain.  The etiology is not clear.  His coronary CT angiogram was reported minimal coronary artery disease.  His exercise stress echo was reported negative for inducible myocardial ischemia, no LVOT obstruction. The patient was evaluated and ruled out musculoskeletal pain, GI related, pulmonary related chest pain.  The only explanation could be microvascular disease in thickened myocardium.   We discussed further evaluation and management. His ventricular is well controlled around 60 BPM with carvedilol 15.625 mg bid.  I would like to refer the patient to hypertrophic cardiomyopathy team in [facility] for second opinion.  Patient agreed with the plan.  Meantime, try Imdur to see if Imdur will help to reduce possible microvascular disease related chest pain.  If it does not work, may consider diltiazem as next option.    2.  Calcified coronary atherosclerosis: The patient has total coronary calcium score 35.  Less likely his chest pain is caused by coronary artery disease.  Continue risk factor modification.  Continue atorvastatin.    3.  Essential hypertension: His blood pressure is controlled with carvedilol.     4.  Dyslipidemia: Continue atorvastatin 20 mg at bedtime.  LDL was controlled.     5.  Ascending aortic aneurysm 40 mm:  Continue to follow-up with.    Total 48 minutes were spent in this visit for face to face visit, physical exam, review of current labs/imaging studies, plan for ongoing treatment with >50% spent on counseling and coordination of care as documented in the above mentioned note.       Thank you for the opportunity to be involved in the care of Kiko Tovar. If you have any questions, please feel free to contact me.  I will see the patient again in 6 months and as needed.    Much or all of the text in this note was generated through the use of Dragon Dictate voice-to-text software. Errors in spelling or words which seem out of context are unintentional. Sound alike errors, in particular, may have escaped editing.       History of Present Illness:   It is my pleasure to see Kiko Tovar at the [facility] clinic for routine cardiology follow up.  Kiko Tovar is a 61 year old male with a medical history of hypertrophic cardiomyopathy, mildly dilated mid ascending aorta, essential hypertension, dyslipidemia and anxiety.    The patient complains of intermittent exertional chest pain.  He describes his chest pain as located upper anterior chest, mild in severity, dull pain, sometimes radiated to both arms, typically associated with exertion, relieved by rest in 1 to 2 minutes.  The frequency and severity of his chest pain has been stable.  He has no shortness of breath, palpitations, dizziness, orthopnea, PND or leg edema.  He has no syncopal episode.  His blood pressure and heart rate are controlled.  His pulse is around 60 bpm.    Past Medical History:     Patient Active Problem List   Diagnosis     Neck pain     Headache     Adjustment disorder with mixed anxiety and depressed mood     Coronary atherosclerosis due to calcified coronary lesion     Hypertrophic cardiomyopathy (H)     Chest pain, unspecified type     Essential hypertension     Dyslipidemia, goal LDL below 100     Ascending aorta dilation (H)       Past Surgical History:     Past Surgical History:   Procedure Laterality Date     IR MISCELLANEOUS PROCEDURE  1/27/2010     IR PORT PLACEMENT >5 YEARS  2010     IR PORT REMOVAL  2011       Family History:     Family History   Problem Relation Age of Onset      Chronic Kidney Disease Mother      Hypertension Father      Peripheral Vascular Disease Father 82.00     Dementia Father      Other - See Comments Father 82.00         one week after a fall at home;  in      No Known Problems Sister      Cancer Brother         laryngeal and esophageal cancer     No Known Problems Brother      No Known Problems Brother         Social History:    reports that he has never smoked. He has never used smokeless tobacco. He reports current alcohol use.    Review of Systems:   12 systems are reviewed negative except for in HPI.    Meds:     Current Outpatient Medications:      amitriptyline (ELAVIL) 50 MG tablet, Take 50 mg by mouth At Bedtime, Disp: , Rfl:      aspirin (ASA) 81 MG EC tablet, Take 81 mg by mouth daily, Disp: , Rfl:      atorvastatin (LIPITOR) 20 MG tablet, Take 20 mg by mouth daily, Disp: , Rfl:      carvedilol (COREG) 12.5 MG tablet, Take 1 tablet (12.5 mg) by mouth 2 times daily (with meals), Disp: 180 tablet, Rfl: 0     carvedilol (COREG) 3.125 MG tablet, Take 1 tablet (3.125 mg) by mouth 2 times daily (with meals) Take with 12.5 mg bid for total of 15.625 mg bid., Disp: 180 tablet, Rfl: 0     gabapentin (NEURONTIN) 100 MG capsule, Take 100 mg by mouth daily Take 100 mg tab with 2 300mg tabs to total 700mg daily, Disp: , Rfl:      gabapentin (NEURONTIN) 300 MG capsule, Take 600 mg by mouth daily, Disp: , Rfl:      Magnesium Chloride POWD, 0.5 teaspoonful 2 times daily as needed, Disp: , Rfl:      melatonin 1 MG TABS tablet, Take 1 mg by mouth, Disp: , Rfl:      senna (SENOKOT) 8.6 MG tablet, Take 2 tablets by mouth daily as needed, Disp: , Rfl:      UNABLE TO FIND, Take 2 capsules by mouth daily MEDICATION NAME: Super Aloe 250 (250mg), Disp: , Rfl:      Vitamin D-Vitamin K (VITAMIN K2-VITAMIN D3 PO), Take 5,000 Units by mouth daily, Disp: , Rfl:     Allergies:   Amlodipine, Gluten meal, Isordil [isosorbide], and Adhesive tape      Objective:      Physical  Exam  72.6 kg (160 lb)     Body mass index is 26.63 kg/m .  /70 (BP Location: Left arm, Patient Position: Sitting, Cuff Size: Adult Large)   Pulse 64   Resp 12   Wt 72.6 kg (160 lb)   BMI 26.63 kg/m      General Appearance:   Awake, Alert, No acute distress.   HEENT:  Pupil equal and reactive to light. No scleral icterus; the mucous membranes were moist.   Neck: No cervical bruits. No JVD. No thyromegaly.     Chest: The spine was straight. The chest was symmetric.   Lungs:   Respirations unlabored; Lungs are clear to auscultation. No crackles. No wheezing.   Cardiovascular:   Regular rhythm and rate, normal first and second heart sounds with no murmurs. No rubs or gallops.    Abdomen:  Soft. No tenderness. Non-distended. Bowels sounds are present   Extremities: Equal tibial pulses. No leg edema.   Skin: No rashes or ulcers. Warm, Dry.   Musculoskeletal: No tenderness. No deformity.   Neurologic: Mood and affect are appropriate. No focal deficits.         EKG:  Personally reivewed  Sinus arrhythmia with poor R wave progression  Otherwise normal ECG  When compared to previous ECG  No significant change     Cardiac Imaging Studies  Exercise stress echo on 2-:    The stress electrocardiogram is negative for inducible ischemic EKG changes.    The patient's exercise tolerance was normal.    Left ventricle ejection fraction is normal. The estimated left ventricular ejection fraction is 65%.    Stress echocardiogram is negative for inducible ischemia.    Blood pressure demonstrated a hypertensive response to exercise.    Chest pain with exercise with no ECG or Echo evidence of ischemia    Mild concentric LVH seen on echo imaging     Coronary CTA on 6-:    The total Agatston calcium score is 35. A calcium score in this range places the individual in the 29th percentile when compared to an age and gender matched control group and implies a moderate risk of cardiac events in the next ten years.     Minimal coronary disease with no obstructive plaque identified.     Cardiac MRI on 9-9-2021:  1.  Normal left ventricular size and systolic function.  The quantified left ventricular ejection fraction  is 74%. Moderate asymmetric septal hypertrophy of the basal anteroseptal wall with maximal diameter of 19  mm.   Small area of diffuse myocardial fibrosis in the basal anteroseptal wall segment (1% of total myocardium).  No evidence of significant LVOT gradient.  No PATRICIA.   2.  Normal right ventricular size and systolic function.    3.  No significant valvular abnormalities.    Stress cardiac MRI on 12-5-2016:  1.  Lexiscan stress cardiac MRI is negative for inducible myocardial ischemia.   2.  Lexiscan stress ECG is negative for inducible myocardial ischemia.  3.  No previous myocardial infarction is identified.  4.  Cardiac MRI images indicated that there is a focal hypertrophic cardiomyopathy at basal anterior septal wall (1.9 cm in thickness) with mild patchy gadolinium enhancement.   5.  Normal left ventricular size and function. The calculated left ventricular ejection fraction is 62%.  6.  Normal right ventricular size and function.  7.  No obvious valvular disease.  8.  Black blood images indicated mildly enlarged mid ascending aorta, measured 4.0 cm in size     Comments: Based cardiac MRI image findings, there is a low risk of cardiac event from mild patchy enhancement. Hypertrophic cardiomyopathy is also focus in basal anteroseptal wall.    Lab Review   Lab Results   Component Value Date     07/10/2020     12/01/2019    CO2 26 07/10/2020    CO2 29 12/01/2019    BUN 24 07/10/2020    BUN 19 12/01/2019     Lab Results   Component Value Date    WBC 11.8 12/01/2019    HGB 13.5 07/10/2020    HGB 13.0 12/01/2019    HCT 40.4 12/01/2019    MCV 93 12/01/2019     12/01/2019     Lab Results   Component Value Date    CHOL 226 (H) 03/10/2016     Lab Results   Component Value Date    HDL 55 03/10/2016

## 2022-03-30 NOTE — LETTER
3/30/2022    Lissette Gonzales MD  [facility] [address]  [place] [address]    RE: Kiko Tovar       Dear Colleague,     I had the pleasure of seeing Kiko Tovar in the [facility].            Assessment/Plan:   1.  Hypertrophic cardiomyopathy: His cardiac MRI demonstrated basal anterior septum 19 mm in thickness with minimal amount of patchy delayed gadolinium enhancement.  The patient has no other risk of factors of sudden cardiac arrest.  He has no LVOT obstruction at rest imaging scan.  No indication for ICD placement for primary prevention at this time.    He complains of exertional chest pain.  The etiology is not clear.  His coronary CT angiogram was reported minimal coronary artery disease.  His exercise stress echo was reported negative for inducible myocardial ischemia, no LVOT obstruction. The patient was evaluated and ruled out musculoskeletal pain, GI related, pulmonary related chest pain.  The only explanation could be microvascular disease in thickened myocardium.   We discussed further evaluation and management. His ventricular is well controlled around 60 BPM with carvedilol 15.625 mg bid.  I would like to refer the patient to hypertrophic cardiomyopathy team in [facility] for second opinion.  Patient agreed with the plan.  Meantime, try Imdur to see if Imdur will help to reduce possible microvascular disease related chest pain.  If it does not work, may consider diltiazem as next option.    2.  Calcified coronary atherosclerosis: The patient has total coronary calcium score 35.  Less likely his chest pain is caused by coronary artery disease.  Continue risk factor modification.  Continue atorvastatin.    3.  Essential hypertension: His blood pressure is controlled with carvedilol.     4.  Dyslipidemia: Continue atorvastatin 20 mg at bedtime.  LDL was controlled.     5.  Ascending aortic aneurysm 40 mm:  Continue to follow-up with.    Total 48  minutes were spent in this visit for face to face visit, physical exam, review of current labs/imaging studies, plan for ongoing treatment with >50% spent on counseling and coordination of care as documented in the above mentioned note.      Thank you for the opportunity to be involved in the care of Kiko Tovar. If you have any questions, please feel free to contact me.  I will see the patient again in 6 months and as needed.    Much or all of the text in this note was generated through the use of Dragon Dictate voice-to-text software. Errors in spelling or words which seem out of context are unintentional. Sound alike errors, in particular, may have escaped editing.       History of Present Illness:   It is my pleasure to see Kiko Tovar at the [facility] clinic for routine cardiology follow up.  Kiko Tovar is a 61 year old male with a medical history of hypertrophic cardiomyopathy, mildly dilated mid ascending aorta, essential hypertension, dyslipidemia and anxiety.    The patient complains of intermittent exertional chest pain.  He describes his chest pain as located upper anterior chest, mild in severity, dull pain, sometimes radiated to both arms, typically associated with exertion, relieved by rest in 1 to 2 minutes.  The frequency and severity of his chest pain has been stable.  He has no shortness of breath, palpitations, dizziness, orthopnea, PND or leg edema.  He has no syncopal episode.  His blood pressure and heart rate are controlled.  His pulse is around 60 bpm.    Past Medical History:     Patient Active Problem List   Diagnosis     Neck pain     Headache     Adjustment disorder with mixed anxiety and depressed mood     Coronary atherosclerosis due to calcified coronary lesion     Hypertrophic cardiomyopathy (H)     Chest pain, unspecified type     Essential hypertension     Dyslipidemia, goal LDL below 100     Ascending aorta dilation (H)       Past Surgical History:      Past Surgical History:   Procedure Laterality Date     IR MISCELLANEOUS PROCEDURE  2010     IR PORT PLACEMENT >5 YEARS  2010     IR PORT REMOVAL         Family History:     Family History   Problem Relation Age of Onset     Chronic Kidney Disease Mother      Hypertension Father      Peripheral Vascular Disease Father 82.00     Dementia Father      Other - See Comments Father 82.00         one week after a fall at home;  in      No Known Problems Sister      Cancer Brother         laryngeal and esophageal cancer     No Known Problems Brother      No Known Problems Brother         Social History:    reports that he has never smoked. He has never used smokeless tobacco. He reports current alcohol use.    Review of Systems:   12 systems are reviewed negative except for in HPI.    Meds:     Current Outpatient Medications:      amitriptyline (ELAVIL) 50 MG tablet, Take 50 mg by mouth At Bedtime, Disp: , Rfl:      aspirin (ASA) 81 MG EC tablet, Take 81 mg by mouth daily, Disp: , Rfl:      atorvastatin (LIPITOR) 20 MG tablet, Take 20 mg by mouth daily, Disp: , Rfl:      carvedilol (COREG) 12.5 MG tablet, Take 1 tablet (12.5 mg) by mouth 2 times daily (with meals), Disp: 180 tablet, Rfl: 0     carvedilol (COREG) 3.125 MG tablet, Take 1 tablet (3.125 mg) by mouth 2 times daily (with meals) Take with 12.5 mg bid for total of 15.625 mg bid., Disp: 180 tablet, Rfl: 0     gabapentin (NEURONTIN) 100 MG capsule, Take 100 mg by mouth daily Take 100 mg tab with 2 300mg tabs to total 700mg daily, Disp: , Rfl:      gabapentin (NEURONTIN) 300 MG capsule, Take 600 mg by mouth daily, Disp: , Rfl:      Magnesium Chloride POWD, 0.5 teaspoonful 2 times daily as needed, Disp: , Rfl:      melatonin 1 MG TABS tablet, Take 1 mg by mouth, Disp: , Rfl:      senna (SENOKOT) 8.6 MG tablet, Take 2 tablets by mouth daily as needed, Disp: , Rfl:      UNABLE TO FIND, Take 2 capsules by mouth daily MEDICATION NAME: Super Aloe 250  (250mg), Disp: , Rfl:      Vitamin D-Vitamin K (VITAMIN K2-VITAMIN D3 PO), Take 5,000 Units by mouth daily, Disp: , Rfl:     Allergies:   Amlodipine, Gluten meal, Isordil [isosorbide], and Adhesive tape      Objective:      Physical Exam  72.6 kg (160 lb)     Body mass index is 26.63 kg/m .  /70 (BP Location: Left arm, Patient Position: Sitting, Cuff Size: Adult Large)   Pulse 64   Resp 12   Wt 72.6 kg (160 lb)   BMI 26.63 kg/m      General Appearance:   Awake, Alert, No acute distress.   HEENT:  Pupil equal and reactive to light. No scleral icterus; the mucous membranes were moist.   Neck: No cervical bruits. No JVD. No thyromegaly.     Chest: The spine was straight. The chest was symmetric.   Lungs:   Respirations unlabored; Lungs are clear to auscultation. No crackles. No wheezing.   Cardiovascular:   Regular rhythm and rate, normal first and second heart sounds with no murmurs. No rubs or gallops.    Abdomen:  Soft. No tenderness. Non-distended. Bowels sounds are present   Extremities: Equal tibial pulses. No leg edema.   Skin: No rashes or ulcers. Warm, Dry.   Musculoskeletal: No tenderness. No deformity.   Neurologic: Mood and affect are appropriate. No focal deficits.         EKG:  Personally reivewed  Sinus arrhythmia with poor R wave progression  Otherwise normal ECG  When compared to previous ECG  No significant change     Cardiac Imaging Studies  Exercise stress echo on 2-:    The stress electrocardiogram is negative for inducible ischemic EKG changes.    The patient's exercise tolerance was normal.    Left ventricle ejection fraction is normal. The estimated left ventricular ejection fraction is 65%.    Stress echocardiogram is negative for inducible ischemia.    Blood pressure demonstrated a hypertensive response to exercise.    Chest pain with exercise with no ECG or Echo evidence of ischemia    Mild concentric LVH seen on echo imaging     Coronary CTA on 6-:    The total  Agatston calcium score is 35. A calcium score in this range places the individual in the 29th percentile when compared to an age and gender matched control group and implies a moderate risk of cardiac events in the next ten years.    Minimal coronary disease with no obstructive plaque identified.     Cardiac MRI on 9-9-2021:  1.  Normal left ventricular size and systolic function.  The quantified left ventricular ejection fraction  is 74%. Moderate asymmetric septal hypertrophy of the basal anteroseptal wall with maximal diameter of 19  mm.   Small area of diffuse myocardial fibrosis in the basal anteroseptal wall segment (1% of total myocardium).  No evidence of significant LVOT gradient.  No PATRICIA.   2.  Normal right ventricular size and systolic function.    3.  No significant valvular abnormalities.    Stress cardiac MRI on 12-5-2016:  1.  Lexiscan stress cardiac MRI is negative for inducible myocardial ischemia.   2.  Lexiscan stress ECG is negative for inducible myocardial ischemia.  3.  No previous myocardial infarction is identified.  4.  Cardiac MRI images indicated that there is a focal hypertrophic cardiomyopathy at basal anterior septal wall (1.9 cm in thickness) with mild patchy gadolinium enhancement.   5.  Normal left ventricular size and function. The calculated left ventricular ejection fraction is 62%.  6.  Normal right ventricular size and function.  7.  No obvious valvular disease.  8.  Black blood images indicated mildly enlarged mid ascending aorta, measured 4.0 cm in size     Comments: Based cardiac MRI image findings, there is a low risk of cardiac event from mild patchy enhancement. Hypertrophic cardiomyopathy is also focus in basal anteroseptal wall.    Lab Review   Lab Results   Component Value Date     07/10/2020     12/01/2019    CO2 26 07/10/2020    CO2 29 12/01/2019    BUN 24 07/10/2020    BUN 19 12/01/2019     Lab Results   Component Value Date    WBC 11.8 12/01/2019    HGB  13.5 07/10/2020    HGB 13.0 12/01/2019    HCT 40.4 12/01/2019    MCV 93 12/01/2019     12/01/2019     Lab Results   Component Value Date    CHOL 226 (H) 03/10/2016     Lab Results   Component Value Date    HDL 55 03/10/2016                 Thank you for allowing me to participate in the care of your patient.      Sincerely,     Gretchen Dave MD     [facility]  cc:   Gretchen Dave MD  [address] [address]  [place] [address]

## 2022-05-09 ENCOUNTER — MEDICAL CORRESPONDENCE (OUTPATIENT)
Dept: HEALTH INFORMATION MANAGEMENT | Facility: CLINIC | Age: 62
End: 2022-05-09
Payer: COMMERCIAL

## 2022-05-09 ENCOUNTER — TRANSFERRED RECORDS (OUTPATIENT)
Dept: HEALTH INFORMATION MANAGEMENT | Facility: CLINIC | Age: 62
End: 2022-05-09
Payer: COMMERCIAL

## 2022-05-09 DIAGNOSIS — I42.2 HYPERTROPHIC CARDIOMYOPATHY (H): ICD-10-CM

## 2022-05-09 RX ORDER — CARVEDILOL 3.12 MG/1
3.12 TABLET ORAL 2 TIMES DAILY WITH MEALS
Qty: 180 TABLET | Refills: 1 | Status: SHIPPED | OUTPATIENT
Start: 2022-05-09 | End: 2022-08-31

## 2022-05-10 DIAGNOSIS — I42.2 HYPERTROPHIC CARDIOMYOPATHY (H): ICD-10-CM

## 2022-05-10 RX ORDER — CARVEDILOL 12.5 MG/1
TABLET ORAL
Qty: 180 TABLET | Refills: 0 | Status: SHIPPED | OUTPATIENT
Start: 2022-05-10 | End: 2023-10-05

## 2022-05-14 ENCOUNTER — HEALTH MAINTENANCE LETTER (OUTPATIENT)
Age: 62
End: 2022-05-14

## 2022-06-03 ENCOUNTER — TELEPHONE (OUTPATIENT)
Dept: CARDIOLOGY | Facility: CLINIC | Age: 62
End: 2022-06-03
Payer: COMMERCIAL

## 2022-06-03 DIAGNOSIS — I25.85 CARDIAC MICROVASCULAR DISEASE: ICD-10-CM

## 2022-06-03 RX ORDER — ISOSORBIDE MONONITRATE 30 MG/1
30 TABLET, EXTENDED RELEASE ORAL DAILY
Qty: 30 TABLET | Refills: 3 | Status: SHIPPED | OUTPATIENT
Start: 2022-06-03 | End: 2022-10-04

## 2022-06-03 NOTE — TELEPHONE ENCOUNTER
"recvd call from patient requesting refill of isosorbate mononitrate 30 mg tablets. Advised patient refill has been sent to [place] pharmacy per indicated. Pt expressed understanding. No further questions or concerns. Will follow up after echo. -lidia    \"[facility] Call Center  Phone Message  May a detailed message be left on voicemail: yes   Reason for Call: Medication Refill Request    Has the patient contacted the pharmacy for the refill? Yes   Name of medication being requested: isosorbide mononitrate (IMDUR) 30 MG 24 hr tablet  Provider who prescribed the medication: Dr Dave  Pharmacy:  [facility] [address]  Date medication is needed: 06.06.22   iKko is calling to refill this prescription. He called the pharmacy and they told him that they sent a request and were waiting to hear from us. He does have enough of this medication to get him through the weekend. If you have any questions please call Kiko or the pharmacy to discuss.     Action Taken: Other: Cardiology     Travel Screening: Not Applicable\"  "

## 2022-06-06 ENCOUNTER — HOSPITAL ENCOUNTER (OUTPATIENT)
Dept: CARDIOLOGY | Facility: HOSPITAL | Age: 62
Discharge: HOME | End: 2022-06-06
Attending: FAMILY MEDICINE | Admitting: FAMILY MEDICINE
Payer: COMMERCIAL

## 2022-06-06 DIAGNOSIS — R07.9 CHEST PAIN: ICD-10-CM

## 2022-06-06 PROCEDURE — 93321 DOPPLER ECHO F-UP/LMTD STD: CPT | Mod: 26 | Performed by: INTERNAL MEDICINE

## 2022-06-06 PROCEDURE — 93325 DOPPLER ECHO COLOR FLOW MAPG: CPT | Mod: TC

## 2022-06-06 PROCEDURE — 93350 STRESS TTE ONLY: CPT | Mod: 26 | Performed by: INTERNAL MEDICINE

## 2022-06-06 PROCEDURE — 93016 CV STRESS TEST SUPVJ ONLY: CPT | Performed by: INTERNAL MEDICINE

## 2022-06-06 PROCEDURE — 93325 DOPPLER ECHO COLOR FLOW MAPG: CPT | Mod: 26 | Performed by: INTERNAL MEDICINE

## 2022-06-06 PROCEDURE — 93018 CV STRESS TEST I&R ONLY: CPT | Performed by: INTERNAL MEDICINE

## 2022-07-20 ENCOUNTER — MYC MEDICAL ADVICE (OUTPATIENT)
Dept: CARDIOLOGY | Facility: CLINIC | Age: 62
End: 2022-07-20

## 2022-07-28 ENCOUNTER — TELEPHONE (OUTPATIENT)
Dept: CARDIOLOGY | Facility: CLINIC | Age: 62
End: 2022-07-28

## 2022-07-28 NOTE — TELEPHONE ENCOUNTER
Will send [employer] message to patient- faxed referral request to [facility] [phone]-WENDY

## 2022-07-28 NOTE — TELEPHONE ENCOUNTER
----- Message from Gretchen Dave MD sent at 7/28/2022  2:18 PM CDT -----  Anjelica,    This patient would like to be referred to [facility] for further evaluation and management of his HCM.    Could you help him?    Thanks  Robert

## 2022-08-09 ENCOUNTER — TELEPHONE (OUTPATIENT)
Dept: CARDIOLOGY | Facility: CLINIC | Age: 62
End: 2022-08-09

## 2022-08-09 NOTE — TELEPHONE ENCOUNTER
Spoke with patient regarding call back request. Re-faxed referral form to [facility]. Pt requested referral be called in to [phone]. Advised would call in and send a copy to patient for their records. -WENDY

## 2022-08-18 ENCOUNTER — TELEPHONE (OUTPATIENT)
Dept: CARDIOLOGY | Facility: CLINIC | Age: 62
End: 2022-08-18

## 2022-08-31 DIAGNOSIS — I42.2 HYPERTROPHIC CARDIOMYOPATHY (H): ICD-10-CM

## 2022-08-31 RX ORDER — CARVEDILOL 3.12 MG/1
TABLET ORAL
Qty: 180 TABLET | Refills: 1 | Status: SHIPPED | OUTPATIENT
Start: 2022-08-31 | End: 2023-10-05

## 2022-09-04 ENCOUNTER — HEALTH MAINTENANCE LETTER (OUTPATIENT)
Age: 62
End: 2022-09-04

## 2022-09-28 ENCOUNTER — TELEPHONE (OUTPATIENT)
Dept: CARDIOLOGY | Facility: CLINIC | Age: 62
End: 2022-09-28

## 2022-09-28 NOTE — TELEPHONE ENCOUNTER
Health Call Center    Phone Message    May a detailed message be left on voicemail: yes     Reason for Call: Other: mitriptyline (ELAVIL) 50 MG tablet needs to be approved by Dr. WEST Dave before it can be refilled. Pt just got the note from his Pharmacy that Dr. Dave needs to contact Nahed Toscano MD -Sleep specialist - [facility]  at [phone] to approve the meds.  Pt is leaving for a 5 week trip early tomorrow morning 9.29.22 and needs this med filled before he leaves. Pt requested expedited process if at all possible.    Action Taken: Message routed to:  [facility] cardio    Travel Screening: Not Applicable     Thank you!  [facility]

## 2022-09-28 NOTE — TELEPHONE ENCOUNTER
Spoke with [facility] they wanted to ensure cardiology was aware pt is taking amitriptyline 75mg. Confirmed in prior notes from Dr. Dave it is indicated on med list. [facility] indicated will update their records and will send refill to pharmacy. Advised would contact patient with outcome. -WENDY

## 2022-09-28 NOTE — TELEPHONE ENCOUNTER
Spoke with patient. Confirmed [facility] clinic needed clearance from cardiac standpoint.  03/22 office visit from Dr. Dave indicated the use of amitriptyline. Advised pt will contact provider as requested. -WENDY

## 2022-10-04 ENCOUNTER — MYC MEDICAL ADVICE (OUTPATIENT)
Dept: CARDIOLOGY | Facility: CLINIC | Age: 62
End: 2022-10-04

## 2022-10-04 DIAGNOSIS — I25.85 CARDIAC MICROVASCULAR DISEASE: ICD-10-CM

## 2022-10-04 RX ORDER — ISOSORBIDE MONONITRATE 30 MG/1
30 TABLET, EXTENDED RELEASE ORAL DAILY
Qty: 30 TABLET | Refills: 7 | Status: SHIPPED | OUTPATIENT
Start: 2022-10-04 | End: 2022-10-04

## 2022-10-04 RX ORDER — ISOSORBIDE MONONITRATE 30 MG/1
30 TABLET, EXTENDED RELEASE ORAL DAILY
Qty: 30 TABLET | Refills: 7 | Status: SHIPPED | OUTPATIENT
Start: 2022-10-04 | End: 2023-10-05

## 2022-11-09 ENCOUNTER — TRANSFERRED RECORDS (OUTPATIENT)
Dept: HEALTH INFORMATION MANAGEMENT | Facility: CLINIC | Age: 62
End: 2022-11-09

## 2022-12-01 ENCOUNTER — TELEPHONE (OUTPATIENT)
Dept: ORTHOPEDICS | Facility: CLINIC | Age: 62
End: 2022-12-01

## 2022-12-01 NOTE — TELEPHONE ENCOUNTER
Attempted to contact patient, left message to return my call.    PLAN: patient to see Dr. Mullins sometime in December for one year follow up.

## 2022-12-01 NOTE — TELEPHONE ENCOUNTER
----- Message from Liz Tay RN sent at 12/1/2022  2:06 PM CST -----  Regarding: RE: reminder  Hi everyone,    -Thanks for sending this over, Nelsy.  -I have CC'd Dr. Mullins's nurse on here as well   -April: would you mind reaching out to the patient to see if he would like to come back in for a clinic visit?  He's due for his 1 year follow up.     Here is what Dr. Mullins wrote in his previous chart note, dated 12/7:    Impression: 8 cm lipoma left proximal forearm.     Plan: I reviewed the options of surgical excision versus observation.  He prefers the latter.  He will contact us in 1 year if he would like to have the limb reimaged.    Thanks,  Liz Tay RN    ----- Message -----  From: Nelsy Perry RN  Sent: 12/1/2022   1:01 PM CST  To: Lissette Wolfe PA-C, #  Subject: FW: reminder                                     FYI to watch for  ----- Message -----  From: Nelsy Perry RN  Sent: 12/1/2022   7:00 AM CST  To: Nelsy Perry RN  Subject: reminder                                         Impression: 8 cm lipoma left proximal forearm.     Plan: I reviewed the options of surgical excision versus observation.  He prefers the latter.  He will contact us in 1 year if he would like to have the limb reimaged.

## 2022-12-05 NOTE — TELEPHONE ENCOUNTER
Returned patient's call.  See other encounter.  Patient was wondering if it is necessary to come in to see Dr. Mullins?  Left message that appointment had been advised by Dr. Mullins.

## 2023-05-11 ENCOUNTER — TELEPHONE (OUTPATIENT)
Dept: CARDIOLOGY | Facility: CLINIC | Age: 63
End: 2023-05-11
Payer: COMMERCIAL

## 2023-05-11 NOTE — TELEPHONE ENCOUNTER
[facility] Call Center    Phone Message    May a detailed message be left on voicemail: yes     Reason for Call: Other: patient is calling regarding montly echos that he needs to get done as it is a protocall with a new medication he has started, patient would like to know if  will over see and view these echos as they are completed, the echos will orginally be from a provider he sees at the [facility] (cardiologist), please advise,thank you.     Action Taken: Other: cardiology    Travel Screening: Not Applicable   Thank you!  [facility]

## 2023-05-12 NOTE — TELEPHONE ENCOUNTER
Spoke with pt regarding call back request., advised pt to speak with cardiologist at [facility], no MR or communication from their office has been received to indicate referrals/medicaton management/nor need for further imaging is needed.     Advised the provider whom is managing the medication and requesting monthly echocardiograms would be managing these orders. Can submit orders to [facility] to obtain imaging    Pt indicated  [facility] is deferring to Dr. Dave for mgmt. Advised we would not be completing monthly echos without orders. Pt will discuss with cardiologist at [facility] further advised pt to follow up with insurance carrier as having monthly imaging may need PA or not approved. Pt expressed understanding-WENDY

## 2023-05-30 ENCOUNTER — TELEPHONE (OUTPATIENT)
Dept: CARDIOLOGY | Facility: CLINIC | Age: 63
End: 2023-05-30
Payer: COMMERCIAL

## 2023-05-30 NOTE — TELEPHONE ENCOUNTER
ROSANNE Maynard from [facility] called in. Will resend letter from [facility] with outline for medication and need for repeat echos. Advised Dr. Dave is currently out of the office and cannot confirm if he will continue on requested imaging. That if Dr. Coles wanted to send an order for an echo, kirk can complete however unlikely that it would be done on Thursday of this week. If Dr. Dave approves managing the future orders for the echo can schedule patient. Carmenza indicated will contact patient to inform the first echo will need to be done at [facility]. Stated the way their clinic works is they will set the patient up on the medication and it is up to the patient to find someone local to take over an order echocardiograms on a scheduled basis.     Will connect with RN later today to confirm if fax is received. [phone]

## 2023-05-30 NOTE — TELEPHONE ENCOUNTER
[facility] Call Center    Phone Message    May a detailed message be left on voicemail: yes     Reason for Call: Other: Pt calling in to schedule echos. Please return call to assist or advise pt as he said he has been on the phone all day back and forth trying to get order issues resolved.      Action Taken: Other: Cardiology    Travel Screening: Not Applicable     Thank you!  [facility]

## 2023-05-30 NOTE — TELEPHONE ENCOUNTER
[facility] Call Center    Phone Message    May a detailed message be left on voicemail: yes     Reason for Call: Other: Pt has to have a standing echo order placed for medication managment through [facility]. [facility] said they wouldn't send the referral for an echo due to this being [place] and not [facility]. Please call the pt to discuss. He said it has to be done by thursday of this week.      Action Taken: Message routed to:  Other: Cardiology    Travel Screening: Not Applicable     Thank you!  [facility]

## 2023-05-30 NOTE — TELEPHONE ENCOUNTER
Uday Coles M.D., Ph.D. with [facility] [phone] and [phone]    Juan- [phone] direct number for clinicals    refaxing letter from 05/16/23 that outlines the schedule of necessary echos. Advised pt needs structured echos. Will await letter and will speak to their care team. Juan was in MR and nursing was not available.     Advised the provider whom is managing the medication and requesting echos should be either ordering echos and or faxing orders to [facility] to complete.  Will await letter and contact care team again thereafter. -WENDY

## 2023-05-31 NOTE — TELEPHONE ENCOUNTER
Spoke with Pt regarding call back request. Confirmed awaiting Dr. Dave's response. As he is out of the office currently, will follow up with recommendations. Has initial echo scheduled with [facility] tomorrow. -WENDY

## 2023-06-03 ENCOUNTER — HEALTH MAINTENANCE LETTER (OUTPATIENT)
Age: 63
End: 2023-06-03

## 2023-06-15 NOTE — TELEPHONE ENCOUNTER
Spoke with Juan with Dr. Bañuelos's office. Will have echos ordered through their office. If they would like to send orders to [facility] they certainly can. However we would not be managing/preemptively ordering those for [facility]. -lidia

## 2023-08-12 ENCOUNTER — APPOINTMENT (OUTPATIENT)
Dept: RADIOLOGY | Facility: HOSPITAL | Age: 63
End: 2023-08-12
Attending: FAMILY MEDICINE
Payer: COMMERCIAL

## 2023-08-12 ENCOUNTER — HOSPITAL ENCOUNTER (EMERGENCY)
Facility: HOSPITAL | Age: 63
Discharge: HOME | End: 2023-08-12
Attending: FAMILY MEDICINE | Admitting: FAMILY MEDICINE
Payer: COMMERCIAL

## 2023-08-12 VITALS
DIASTOLIC BLOOD PRESSURE: 66 MMHG | OXYGEN SATURATION: 97 % | BODY MASS INDEX: 24.99 KG/M2 | WEIGHT: 150 LBS | HEIGHT: 65 IN | TEMPERATURE: 98.7 F | SYSTOLIC BLOOD PRESSURE: 123 MMHG | RESPIRATION RATE: 18 BRPM | HEART RATE: 70 BPM

## 2023-08-12 DIAGNOSIS — R55 NEAR SYNCOPE: ICD-10-CM

## 2023-08-12 DIAGNOSIS — I42.2 HYPERTROPHIC CARDIOMYOPATHY (H): ICD-10-CM

## 2023-08-12 LAB
ANION GAP SERPL CALCULATED.3IONS-SCNC: 10 MMOL/L (ref 7–15)
BASOPHILS # BLD AUTO: 0 10E3/UL (ref 0–0.2)
BASOPHILS NFR BLD AUTO: 0 %
BUN SERPL-MCNC: 25.9 MG/DL (ref 8–23)
CALCIUM SERPL-MCNC: 9.4 MG/DL (ref 8.8–10.2)
CHLORIDE SERPL-SCNC: 108 MMOL/L (ref 98–107)
CREAT SERPL-MCNC: 1.48 MG/DL (ref 0.67–1.17)
DEPRECATED HCO3 PLAS-SCNC: 19 MMOL/L (ref 22–29)
EOSINOPHIL # BLD AUTO: 0 10E3/UL (ref 0–0.7)
EOSINOPHIL NFR BLD AUTO: 0 %
ERYTHROCYTE [DISTWIDTH] IN BLOOD BY AUTOMATED COUNT: 12.6 % (ref 10–15)
GFR SERPL CREATININE-BSD FRML MDRD: 53 ML/MIN/1.73M2
GLUCOSE SERPL-MCNC: 131 MG/DL (ref 70–99)
HCT VFR BLD AUTO: 38.9 % (ref 40–53)
HGB BLD-MCNC: 12.8 G/DL (ref 13.3–17.7)
IMM GRANULOCYTES # BLD: 0 10E3/UL
IMM GRANULOCYTES NFR BLD: 0 %
LYMPHOCYTES # BLD AUTO: 0.5 10E3/UL (ref 0.8–5.3)
LYMPHOCYTES NFR BLD AUTO: 4 %
MCH RBC QN AUTO: 30.4 PG (ref 26.5–33)
MCHC RBC AUTO-ENTMCNC: 32.9 G/DL (ref 31.5–36.5)
MCV RBC AUTO: 92 FL (ref 78–100)
MONOCYTES # BLD AUTO: 1.2 10E3/UL (ref 0–1.3)
MONOCYTES NFR BLD AUTO: 11 %
NEUTROPHILS # BLD AUTO: 9.2 10E3/UL (ref 1.6–8.3)
NEUTROPHILS NFR BLD AUTO: 85 %
NRBC # BLD AUTO: 0 10E3/UL
NRBC BLD AUTO-RTO: 0 /100
PLATELET # BLD AUTO: 183 10E3/UL (ref 150–450)
POTASSIUM SERPL-SCNC: 4.3 MMOL/L (ref 3.4–5.3)
RBC # BLD AUTO: 4.21 10E6/UL (ref 4.4–5.9)
SODIUM SERPL-SCNC: 137 MMOL/L (ref 136–145)
TROPONIN T SERPL HS-MCNC: 12 NG/L
WBC # BLD AUTO: 11 10E3/UL (ref 4–11)

## 2023-08-12 PROCEDURE — 258N000003 HC RX IP 258 OP 636: Performed by: FAMILY MEDICINE

## 2023-08-12 PROCEDURE — 80048 BASIC METABOLIC PNL TOTAL CA: CPT | Performed by: FAMILY MEDICINE

## 2023-08-12 PROCEDURE — 71046 X-RAY EXAM CHEST 2 VIEWS: CPT

## 2023-08-12 PROCEDURE — 93005 ELECTROCARDIOGRAM TRACING: CPT | Performed by: FAMILY MEDICINE

## 2023-08-12 PROCEDURE — 36415 COLL VENOUS BLD VENIPUNCTURE: CPT | Performed by: FAMILY MEDICINE

## 2023-08-12 PROCEDURE — 85025 COMPLETE CBC W/AUTO DIFF WBC: CPT | Performed by: FAMILY MEDICINE

## 2023-08-12 PROCEDURE — 96360 HYDRATION IV INFUSION INIT: CPT

## 2023-08-12 PROCEDURE — 99285 EMERGENCY DEPT VISIT HI MDM: CPT | Mod: 25

## 2023-08-12 PROCEDURE — 84484 ASSAY OF TROPONIN QUANT: CPT | Performed by: FAMILY MEDICINE

## 2023-08-12 RX ADMIN — SODIUM CHLORIDE 500 ML: 9 INJECTION, SOLUTION INTRAVENOUS at 11:56

## 2023-08-12 ASSESSMENT — ENCOUNTER SYMPTOMS
LIGHT-HEADEDNESS: 1
NAUSEA: 1
DIZZINESS: 1
PALPITATIONS: 0
FEVER: 0
SHORTNESS OF BREATH: 0
DECREASED CONCENTRATION: 1
DIARRHEA: 0

## 2023-08-12 ASSESSMENT — ACTIVITIES OF DAILY LIVING (ADL): ADLS_ACUITY_SCORE: 35

## 2023-08-12 NOTE — DISCHARGE INSTRUCTIONS
If you have another episode of lightheadedness and low blood pressure.  Drink some water, lay down with your feet up and recheck the blood pressure in 30 minutes.  If it is improving, you do not need to come to the emergency department.    If you have an episode similar to this and develop chest pain, shortness of breath, or your heart rate is over 120, return to the emergency department.

## 2023-08-12 NOTE — ED TRIAGE NOTES
Pt with c/o feeling lightheaded/ weak and had low bp (70/ ) times 4 this am.  Pt reports doing his PT exercises when he became lightheaded and needed to sit down.  At present pt feels groggy .

## 2023-08-12 NOTE — ED PROVIDER NOTES
EMERGENCY DEPARTMENT ENCOUNTER      NAME: Kiko Tovar  AGE: 63 year old male  YOB: 1960  MRN: [id number]  EVALUATION DATE & TIME: 8/12/2023 11:21 AM    PCP: Lissette Gonzales    ED PROVIDER: Meño Graham M.D.    Chief Complaint   Patient presents with    Hypotension       FINAL IMPRESSION:  1. Near syncope    2. Hypertrophic cardiomyopathy (H)        ED COURSE & MEDICAL DECISION MAKING:    Pertinent Labs & Imaging studies independently interpreted by me. (See chart for details)  11:54 AM  Patient seen and examined, reviewed most recent nephrology note from August 3 to address his chronic kidney disease, felt to be stable at that time with no medication adjustments.  Also reviewed most recent echocardiogram from July 28, 2023 which demonstrated normal left ventricular size, ejection fraction 60% without wall motion abnormalities, mildly thickened aortic valve with mild mitral regurg and trace aortic regurg.  Differential diagnosis includes but not limited to vasovagal syncope, dehydration, electrolyte disturbance, dysrhythmia, myocardial infarction, pulmonary embolism, urinary tract infection, pneumonia, intracranial hemorrhage.  Patient presents today with lightheadedness while he was doing his exercises, also was hypotensive at that time.  Initially on arrival, vital signs are stable, orthostatics positive.  Symptoms may be from dehydration but will evaluate further etiology especially in light of patient's hypertrophic cardiomyopathy.  12:34 PM labs independently interpreted by me reassuring, elevated creatinine but stable for the patient, troponin negative, CBC is reassuring, chest x-ray negative.  Patient was given a small bolus of IV fluids and is stable for discharge.  12:47 PM discussed with cardiology who feels patient is stable for discharge, should have an outpatient Holter monitor and follow-up with his primary cardiologist.    At the conclusion of the encounter I discussed the  results of all of the tests and the disposition. The questions were answered. The patient or family acknowledged understanding and was agreeable with the care plan.     Medical Decision Making    History:  Supplemental history from: Documented in chart, if applicable  External Record(s) reviewed: Documented in chart, if applicable.    Work Up:  Chart documentation includes differential considered and any EKGs or imaging independently interpreted by provider, where specified.  In additional to work up documented, I considered the following work up: Documented in chart, if applicable.    External consultation:  Discussion of management with another provider: Documented in chart, if applicable    Complicating factors:  Care impacted by chronic illness: Heart Disease, Hyperlipidemia, Hypertension, and Mental Health  Care affected by social determinants of health: N/A    Disposition considerations: Discharge. No recommendations on prescription strength medication(s). I considered admission, but ultimately discharged patient after reassuring emergency department evaluation, conversation with the patient, and consultation with cardiology.    EKG:    Performed at: 11:34 AM  Impression: Normal EKG  Rate: 72  Rhythm: Sinus  Axis: Normal  OK Interval: 174  QRS Interval: 78  QTc Interval: 407  ST Changes: No acute ischemic changes  Comparison: Elia 2020, no change    I have independently reviewed and interpreted the EKG(s) documented above.    PROCEDURES:       MEDICATIONS GIVEN IN THE EMERGENCY:  Medications   0.9% sodium chloride BOLUS (500 mLs Intravenous $New Bag 8/12/23 2746)       NEW PRESCRIPTIONS STARTED AT TODAY'S ER VISIT  New Prescriptions    No medications on file       =================================================================    HPI    Patient information was obtained from: Patient      Kiko Tovar is a 63 year old male with a pertinent history of hypertrophic  cardiomyopathy, hypertension, coronary  atherosclerosis and ascending aortic aneurysm who presents to this ED for evaluation of lightheadedness and dizziness.    Patient reports that he was doing physical therapy exercises today (8/12) when he had gotten lightheaded and dizziness. He notes some pain when doing his stretches and does physical therapy for moderate-severe lumbar degenerative disks He then had checked his blood pressure and noticed he was hypotensive 70/57. He has a history of overactive bladder but notes that within the past few days he has had decreased urine output.He also has some mild nausea and felt groggy this morning. He has a history of hypertrophic cardiomyopathy and states that he has a wide range of blood pressures but has never run as low as today.  Also states that he noticed his heart rate was low for him in the 70s.    Patient's significant other had noted he has some leg edema at baseline that he sees nephrology for.     He denied any chest pain, palpitations, shortness of breath, diarrhea, fever, or leg swelling. He is not on lasix and has no personal history of heart failure. He takes carvedilol 25 mg twice daily for his blood pressure. He notes he does have a family history of heart failure. No other complaints at this time.      REVIEW OF SYSTEMS   Review of Systems   Constitutional:  Negative for fever.   Respiratory:  Negative for shortness of breath.    Cardiovascular:  Negative for chest pain, palpitations and leg swelling (new).        Hypotensive. Endorses bradycardia.   Gastrointestinal:  Positive for nausea (mild). Negative for diarrhea.   Genitourinary:  Positive for decreased urine volume.   Neurological:  Positive for dizziness and light-headedness.   Psychiatric/Behavioral:  Positive for decreased concentration.    All other systems reviewed and are negative.     All other systems reviewed and negative    PAST MEDICAL HISTORY:  Past Medical History:   Diagnosis Date    ASHLEY positive 12/29/2014    Ascending  "aortic aneurysm (H) 2016    3.8 cm by CTA 2019    Asymmetric septal hypertrophy (H) 2017    Mild by MRI; no further workup needed per Dr. Gardner.\"The findings indicates a focal hypertrophic cardiomyopathy in basal anteroseptal wall (1.9 cm).\"    Coronary atherosclerosis due to calcified coronary lesion 2016    Dyslipidemia, goal LDL below 100 2021    Essential hypertension 2017    GE reflux     Lyme disease     he had an iv port to treat Lyme disease wt chronic antibiotics for six months by Dr. Lilian Alexander at [facility]       PAST SURGICAL HISTORY:  Past Surgical History:   Procedure Laterality Date    IR MISCELLANEOUS PROCEDURE  2010    IR PORT PLACEMENT >5 YEARS      IR PORT REMOVAL         CURRENT MEDICATIONS:    No current facility-administered medications for this encounter.     Current Outpatient Medications   Medication    amitriptyline (ELAVIL) 50 MG tablet    aspirin (ASA) 81 MG EC tablet    atorvastatin (LIPITOR) 20 MG tablet    carvedilol (COREG) 12.5 MG tablet    carvedilol (COREG) 3.125 MG tablet    gabapentin (NEURONTIN) 100 MG capsule    gabapentin (NEURONTIN) 300 MG capsule    isosorbide mononitrate (IMDUR) 30 MG 24 hr tablet    Magnesium Chloride POWD    melatonin 1 MG TABS tablet    senna (SENOKOT) 8.6 MG tablet    UNABLE TO FIND    Vitamin D-Vitamin K (VITAMIN K2-VITAMIN D3 PO)       ALLERGIES:  Allergies   Allergen Reactions    Amlodipine      Edema in feet    Gluten Meal     Isordil [Isosorbide Nitrate] Headache    Adhesive Tape Rash       FAMILY HISTORY:  Family History   Problem Relation Age of Onset    Chronic Kidney Disease Mother     Hypertension Father     Peripheral Vascular Disease Father 82.00    Dementia Father     Other - See Comments Father 82.00         one week after a fall at home;  in     No Known Problems Sister     Cancer Brother         laryngeal and esophageal cancer    No Known " "Problems Brother     No Known Problems Brother        SOCIAL HISTORY:   Social History     Socioeconomic History    Marital status:     Number of children: 0    Years of education: 16   Tobacco Use    Smoking status: Never    Smokeless tobacco: Never   Substance and Sexual Activity    Alcohol use: Yes     Comment: Alcoholic Drinks/day: rarely   Social History Narrative    Lives alone; bachelors in [employer] from [facility]. He hikes 35 minutes four times a week in 2020.       VITALS:  /66   Pulse 70   Temp 98.7  F (37.1  C) (Temporal)   Resp 18   Ht 1.651 m (5' 5\")   Wt 68 kg (150 lb)   SpO2 97%   BMI 24.96 kg/m      PHYSICAL EXAM:  Physical Exam  Vitals and nursing note reviewed.   Constitutional:       Appearance: Normal appearance.   HENT:      Head: Normocephalic and atraumatic.      Right Ear: External ear normal.      Left Ear: External ear normal.      Nose: Nose normal.      Mouth/Throat:      Mouth: Mucous membranes are moist.   Eyes:      Extraocular Movements: Extraocular movements intact.      Conjunctiva/sclera: Conjunctivae normal.      Pupils: Pupils are equal, round, and reactive to light.   Cardiovascular:      Rate and Rhythm: Normal rate and regular rhythm.   Pulmonary:      Effort: Pulmonary effort is normal.      Breath sounds: Normal breath sounds. No wheezing or rales.   Abdominal:      General: Abdomen is flat. There is no distension.      Palpations: Abdomen is soft.      Tenderness: There is no abdominal tenderness. There is no guarding.   Musculoskeletal:         General: Normal range of motion.      Cervical back: Normal range of motion and neck supple.      Right lower leg: No edema.      Left lower leg: No edema.   Lymphadenopathy:      Cervical: No cervical adenopathy.   Skin:     General: Skin is warm and dry.   Neurological:      General: No focal deficit present.      Mental Status: He is alert and oriented to person, place, and time. Mental status is at " baseline.      Comments: No gross focal neurologic deficits   Psychiatric:         Mood and Affect: Mood normal.         Behavior: Behavior normal.         Thought Content: Thought content normal.          LAB:  All pertinent labs reviewed and interpreted.  Results for orders placed or performed during the hospital encounter of 08/12/23   Chest XR,  PA & LAT    Impression    IMPRESSION: Negative chest.   Basic metabolic panel   Result Value Ref Range    Sodium 137 136 - 145 mmol/L    Potassium 4.3 3.4 - 5.3 mmol/L    Chloride 108 (H) 98 - 107 mmol/L    Carbon Dioxide (CO2) 19 (L) 22 - 29 mmol/L    Anion Gap 10 7 - 15 mmol/L    Urea Nitrogen 25.9 (H) 8.0 - 23.0 mg/dL    Creatinine 1.48 (H) 0.67 - 1.17 mg/dL    Calcium 9.4 8.8 - 10.2 mg/dL    Glucose 131 (H) 70 - 99 mg/dL    GFR Estimate 53 (L) >60 mL/min/1.73m2   CBC with platelets and differential   Result Value Ref Range    WBC Count 11.0 4.0 - 11.0 10e3/uL    RBC Count 4.21 (L) 4.40 - 5.90 10e6/uL    Hemoglobin 12.8 (L) 13.3 - 17.7 g/dL    Hematocrit 38.9 (L) 40.0 - 53.0 %    MCV 92 78 - 100 fL    MCH 30.4 26.5 - 33.0 pg    MCHC 32.9 31.5 - 36.5 g/dL    RDW 12.6 10.0 - 15.0 %    Platelet Count 183 150 - 450 10e3/uL    % Neutrophils 85 %    % Lymphocytes 4 %    % Monocytes 11 %    % Eosinophils 0 %    % Basophils 0 %    % Immature Granulocytes 0 %    NRBCs per 100 WBC 0 <1 /100    Absolute Neutrophils 9.2 (H) 1.6 - 8.3 10e3/uL    Absolute Lymphocytes 0.5 (L) 0.8 - 5.3 10e3/uL    Absolute Monocytes 1.2 0.0 - 1.3 10e3/uL    Absolute Eosinophils 0.0 0.0 - 0.7 10e3/uL    Absolute Basophils 0.0 0.0 - 0.2 10e3/uL    Absolute Immature Granulocytes 0.0 <=0.4 10e3/uL    Absolute NRBCs 0.0 10e3/uL   Result Value Ref Range    Troponin T, High Sensitivity 12 <=22 ng/L       RADIOLOGY:  Reviewed all pertinent imaging. Please see official radiology report.  Chest XR,  PA & LAT   Final Result   IMPRESSION: Negative chest.          Nesha PRICE am serving as a scribe to  document services personally performed by Dr. Graham based on my observation and the provider's statements to me. I, Meño Graham MD attest that Nesha Beltran  is acting in a scribe capacity, has observed my performance of the services and has documented them in accordance with my direction.    Meño Graham M.D.  Emergency Medicine  [facility] EMERGENCY DEPARTMENT  [address] [place] [id number]  [phone]  Dept: [phone]       Meño Graham MD  08/12/23 7338

## 2023-08-14 ENCOUNTER — HOSPITAL ENCOUNTER (EMERGENCY)
Facility: HOSPITAL | Age: 63
Discharge: HOME | End: 2023-08-14
Attending: FAMILY MEDICINE | Admitting: FAMILY MEDICINE
Payer: COMMERCIAL

## 2023-08-14 ENCOUNTER — APPOINTMENT (OUTPATIENT)
Dept: CT IMAGING | Facility: HOSPITAL | Age: 63
End: 2023-08-14
Attending: FAMILY MEDICINE
Payer: COMMERCIAL

## 2023-08-14 VITALS
RESPIRATION RATE: 16 BRPM | WEIGHT: 150 LBS | OXYGEN SATURATION: 99 % | HEART RATE: 68 BPM | HEIGHT: 65 IN | DIASTOLIC BLOOD PRESSURE: 69 MMHG | BODY MASS INDEX: 24.99 KG/M2 | SYSTOLIC BLOOD PRESSURE: 142 MMHG | TEMPERATURE: 98.4 F

## 2023-08-14 DIAGNOSIS — C18.1 MALIGNANT NEOPLASM OF APPENDIX (H): ICD-10-CM

## 2023-08-14 DIAGNOSIS — N30.01 ACUTE CYSTITIS WITH HEMATURIA: ICD-10-CM

## 2023-08-14 LAB
ALBUMIN UR-MCNC: ABNORMAL G/DL
ANION GAP SERPL CALCULATED.3IONS-SCNC: 8 MMOL/L (ref 7–15)
APPEARANCE UR: ABNORMAL
BACTERIA #/AREA URNS HPF: ABNORMAL /HPF
BASOPHILS # BLD AUTO: 0 10E3/UL (ref 0–0.2)
BASOPHILS NFR BLD AUTO: 0 %
BILIRUB UR QL STRIP: ABNORMAL
BUN SERPL-MCNC: 23.2 MG/DL (ref 8–23)
CALCIUM SERPL-MCNC: 9 MG/DL (ref 8.8–10.2)
CHLORIDE SERPL-SCNC: 108 MMOL/L (ref 98–107)
COLOR UR AUTO: ABNORMAL
CREAT SERPL-MCNC: 1.41 MG/DL (ref 0.67–1.17)
DEPRECATED HCO3 PLAS-SCNC: 22 MMOL/L (ref 22–29)
EOSINOPHIL # BLD AUTO: 0.1 10E3/UL (ref 0–0.7)
EOSINOPHIL NFR BLD AUTO: 1 %
ERYTHROCYTE [DISTWIDTH] IN BLOOD BY AUTOMATED COUNT: 12.6 % (ref 10–15)
GFR SERPL CREATININE-BSD FRML MDRD: 56 ML/MIN/1.73M2
GLUCOSE SERPL-MCNC: 106 MG/DL (ref 70–99)
GLUCOSE UR STRIP-MCNC: ABNORMAL MG/DL
HCT VFR BLD AUTO: 40.7 % (ref 40–53)
HGB BLD-MCNC: 13 G/DL (ref 13.3–17.7)
HGB UR QL STRIP: ABNORMAL
IMM GRANULOCYTES # BLD: 0.1 10E3/UL
IMM GRANULOCYTES NFR BLD: 0 %
KETONES UR STRIP-MCNC: ABNORMAL MG/DL
LEUKOCYTE ESTERASE UR QL STRIP: ABNORMAL
LYMPHOCYTES # BLD AUTO: 0.9 10E3/UL (ref 0.8–5.3)
LYMPHOCYTES NFR BLD AUTO: 7 %
MCH RBC QN AUTO: 30.2 PG (ref 26.5–33)
MCHC RBC AUTO-ENTMCNC: 31.9 G/DL (ref 31.5–36.5)
MCV RBC AUTO: 94 FL (ref 78–100)
MONOCYTES # BLD AUTO: 1.3 10E3/UL (ref 0–1.3)
MONOCYTES NFR BLD AUTO: 10 %
MUCOUS THREADS #/AREA URNS LPF: PRESENT /LPF
NEUTROPHILS # BLD AUTO: 10.3 10E3/UL (ref 1.6–8.3)
NEUTROPHILS NFR BLD AUTO: 82 %
NITRATE UR QL: ABNORMAL
NRBC # BLD AUTO: 0 10E3/UL
NRBC BLD AUTO-RTO: 0 /100
PH UR STRIP: ABNORMAL [PH]
PLATELET # BLD AUTO: 213 10E3/UL (ref 150–450)
POTASSIUM SERPL-SCNC: 4.1 MMOL/L (ref 3.4–5.3)
RBC # BLD AUTO: 4.31 10E6/UL (ref 4.4–5.9)
RBC URINE: >182 /HPF
SODIUM SERPL-SCNC: 138 MMOL/L (ref 136–145)
SP GR UR STRIP: ABNORMAL
UROBILINOGEN UR STRIP-MCNC: ABNORMAL MG/DL
WBC # BLD AUTO: 12.6 10E3/UL (ref 4–11)
WBC CLUMPS #/AREA URNS HPF: PRESENT /HPF
WBC URINE: >182 /HPF

## 2023-08-14 PROCEDURE — 74178 CT ABD&PLV WO CNTR FLWD CNTR: CPT

## 2023-08-14 PROCEDURE — 250N000011 HC RX IP 250 OP 636: Mod: JZ | Performed by: FAMILY MEDICINE

## 2023-08-14 PROCEDURE — 36415 COLL VENOUS BLD VENIPUNCTURE: CPT | Performed by: FAMILY MEDICINE

## 2023-08-14 PROCEDURE — 85025 COMPLETE CBC W/AUTO DIFF WBC: CPT | Performed by: FAMILY MEDICINE

## 2023-08-14 PROCEDURE — 80048 BASIC METABOLIC PNL TOTAL CA: CPT | Performed by: FAMILY MEDICINE

## 2023-08-14 PROCEDURE — 96365 THER/PROPH/DIAG IV INF INIT: CPT | Mod: 59

## 2023-08-14 PROCEDURE — 87186 SC STD MICRODIL/AGAR DIL: CPT | Performed by: FAMILY MEDICINE

## 2023-08-14 PROCEDURE — 81003 URINALYSIS AUTO W/O SCOPE: CPT | Performed by: FAMILY MEDICINE

## 2023-08-14 PROCEDURE — 99285 EMERGENCY DEPT VISIT HI MDM: CPT | Mod: 25

## 2023-08-14 RX ORDER — CEFDINIR 300 MG/1
300 CAPSULE ORAL 2 TIMES DAILY
Qty: 20 CAPSULE | Refills: 0 | Status: SHIPPED | OUTPATIENT
Start: 2023-08-14 | End: 2023-10-05

## 2023-08-14 RX ORDER — CEFTRIAXONE 1 G/1
1 INJECTION, POWDER, FOR SOLUTION INTRAMUSCULAR; INTRAVENOUS ONCE
Status: COMPLETED | OUTPATIENT
Start: 2023-08-14 | End: 2023-08-14

## 2023-08-14 RX ORDER — IOPAMIDOL 755 MG/ML
75 INJECTION, SOLUTION INTRAVASCULAR ONCE
Status: COMPLETED | OUTPATIENT
Start: 2023-08-14 | End: 2023-08-14

## 2023-08-14 RX ADMIN — CEFTRIAXONE SODIUM 1 G: 1 INJECTION, POWDER, FOR SOLUTION INTRAMUSCULAR; INTRAVENOUS at 13:29

## 2023-08-14 RX ADMIN — IOPAMIDOL 75 ML: 755 INJECTION, SOLUTION INTRAVENOUS at 14:56

## 2023-08-14 ASSESSMENT — ACTIVITIES OF DAILY LIVING (ADL)
ADLS_ACUITY_SCORE: 35

## 2023-08-14 NOTE — ED PROVIDER NOTES
EMERGENCY DEPARTMENT ENCOUNTER      NAME: Kiko Tovar  AGE: 63 year old male  YOB: 1960  MRN: [id number]  EVALUATION DATE & TIME: 8/14/2023 10:49 AM    PCP: Lissette Gonzales    ED PROVIDER: Meño Graham M.D.    Chief Complaint   Patient presents with    Hematuria    Dysuria       FINAL IMPRESSION:  1. Acute cystitis with hematuria        ED COURSE & MEDICAL DECISION MAKING:    Pertinent Labs & Imaging studies independently interpreted by me. (See chart for details)  11:00 AM  Patient seen and examined, external records reviewed.  Patient was recently seen in the emergency department for syncope, negative evaluation at that time.  Presents today with hematuria.  He also has urinary frequency and urgency which may be related to hematuria but could be from infection.  Vital stable here, no evidence for sepsis.  No flank pain to suggest ureteral stone or obstruction.  Concern for possible urinary retention and clot although patient has been passing urine.  CT scan is ordered along with labs.  12:37 PM labs independently interpreted by me with stable hemoglobin for the patient, stable creatinine, urinalysis with large number of red cells and white cells as well as many bacteria concerning for possible infection, Rocephin IV is ordered.  CT scan is still pending and we will continue to monitor.  White blood cell count slightly elevated from prior 2 days ago which may be from infection or from stress demargination.  4:01 PM CT scan independently interpreted by me does not demonstrate any hydronephrosis, there is bladder wall thickening with some surrounding edema consistent with acute cystitis.  Patient will be discharged on Omnicef pending cultures, follow-up with primary care and urology as needed.  Close attention to fluid intake to prevent urinary obstruction.  Patient's urine has cleared while in the emergency department.    At the conclusion of the encounter I discussed the results of all of  the tests and the disposition. The questions were answered. The patient or family acknowledged understanding and was agreeable with the care plan.     Medical Decision Making    History:  Supplemental history from: Family Member/Significant Other  External Record(s) reviewed: Documented in chart, if applicable.    Work Up:  Chart documentation includes differential considered and any EKGs or imaging independently interpreted by provider, where specified.  In additional to work up documented, I considered the following work up: Documented in chart, if applicable.    External consultation:  Discussion of management with another provider: Documented in chart, if applicable    Complicating factors:  Care impacted by chronic illness: Heart Disease  Care affected by social determinants of health: N/A    Disposition considerations: Discharge. I prescribed additional prescription strength medication(s) as charted. I considered admission, but ultimately discharged patient with reassuring laboratory and radiographic emergency room evaluation.    MEDICATIONS GIVEN IN THE EMERGENCY:  Medications   cefTRIAXone (ROCEPHIN) 1 g vial to attach to  mL bag for ADULTS or NS 50 mL bag for PEDS (0 g Intravenous Stopped 8/14/23 6411)   iopamidol (ISOVUE-370) solution 75 mL (75 mLs Intravenous $Given 8/14/23 6928)       NEW PRESCRIPTIONS STARTED AT TODAY'S ER VISIT  Discharge Medication List as of 8/14/2023  4:30 PM        START taking these medications    Details   cefdinir (OMNICEF) 300 MG capsule Take 1 capsule (300 mg) by mouth 2 times daily, Disp-20 capsule, R-0, E-Prescribe             =================================================================    HPI    Patient information was obtained from: patient      Kiko Tovar is a 63 year old male with a pertinent history of hypertrophic cardiomyopathy who presents to this ED for evaluation of hematuria noted this morning.  Up multiple times during the night to go to bathroom.  " No flank pain or abdominal pain, does have dysuria and frequency.  No fevers or chills.  Cystoscopy July 28.  Dysuria has been going on several days.    REVIEW OF SYSTEMS   Review of Systems   All other systems reviewed and negative    PAST MEDICAL HISTORY:  Past Medical History:   Diagnosis Date    ASHLEY positive 12/29/2014    Ascending aortic aneurysm (H) 12/06/2016    3.8 cm by CTA March 12, 2019    Asymmetric septal hypertrophy (H) 01/11/2017    Mild by MRI; no further workup needed per Dr. Gardner.\"The findings indicates a focal hypertrophic cardiomyopathy in basal anteroseptal wall (1.9 cm).\"    Coronary atherosclerosis due to calcified coronary lesion 2/11/2016    Dyslipidemia, goal LDL below 100 6/2/2021    Essential hypertension 1/11/2017    GE reflux     Lyme disease 2010    he had an iv port to treat Lyme disease wth chronic antibiotics for six months by Dr. Lilian Alexander at [facility]       PAST SURGICAL HISTORY:  Past Surgical History:   Procedure Laterality Date    IR MISCELLANEOUS PROCEDURE  1/27/2010    IR PORT PLACEMENT >5 YEARS  2010    IR PORT REMOVAL  2011       CURRENT MEDICATIONS:    No current facility-administered medications for this encounter.     Current Outpatient Medications   Medication    cefdinir (OMNICEF) 300 MG capsule    amitriptyline (ELAVIL) 50 MG tablet    aspirin (ASA) 81 MG EC tablet    atorvastatin (LIPITOR) 20 MG tablet    carvedilol (COREG) 12.5 MG tablet    carvedilol (COREG) 3.125 MG tablet    gabapentin (NEURONTIN) 100 MG capsule    gabapentin (NEURONTIN) 300 MG capsule    isosorbide mononitrate (IMDUR) 30 MG 24 hr tablet    Magnesium Chloride POWD    melatonin 1 MG TABS tablet    senna (SENOKOT) 8.6 MG tablet    UNABLE TO FIND    Vitamin D-Vitamin K (VITAMIN K2-VITAMIN D3 PO)       ALLERGIES:  Allergies   Allergen Reactions    Amlodipine      Edema in feet    Gluten Meal     Isordil [Isosorbide Nitrate] Headache    Adhesive Tape Rash       FAMILY " "HISTORY:  Family History   Problem Relation Age of Onset    Chronic Kidney Disease Mother     Hypertension Father     Peripheral Vascular Disease Father 82.00    Dementia Father     Other - See Comments Father 82.00         one week after a fall at home;  in     No Known Problems Sister     Cancer Brother         laryngeal and esophageal cancer    No Known Problems Brother     No Known Problems Brother        SOCIAL HISTORY:   Social History     Socioeconomic History    Marital status:     Number of children: 0    Years of education: 16   Tobacco Use    Smoking status: Never    Smokeless tobacco: Never   Substance and Sexual Activity    Alcohol use: Yes     Comment: Alcoholic Drinks/day: rarely   Social History Narrative    Lives alone; bachelors in [employer] from [employer]. He hikes 35 minutes four times a week in .       VITALS:  BP (!) 142/69   Pulse 68   Temp 98.4  F (36.9  C) (Oral)   Resp 16   Ht 1.651 m (5' 5\")   Wt 68 kg (150 lb)   SpO2 99%   BMI 24.96 kg/m      PHYSICAL EXAM:  Physical Exam     LAB:  All pertinent labs reviewed and interpreted.  Results for orders placed or performed during the hospital encounter of 23   CT Abdomen Pelvis w/o & w Contrast    Impression    IMPRESSION:   1.   Mucosal hyperenhancement and mural thickening of the bladder with mild perivesical fat edema. Findings are compatible with acute cystitis with some degree of underlying muscular hypertrophy also possible. Consider urology consultation.  2.  Kidneys, intrarenal collecting systems, ureters and bladder otherwise unremarkable.  3.  Mild prostate enlargement.     UA with Microscopic reflex to Culture    Specimen: Urine, NOS   Result Value Ref Range    Color Urine Brown (A) Colorless, Straw, Light Yellow, Yellow    Appearance Urine Cloudy (A) Clear    Glucose Urine      Bilirubin Urine      Ketones Urine      Specific Gravity Urine      Blood Urine >1.0 mg/dL (A) Negative    pH Urine      " Protein Albumin Urine      Urobilinogen Urine      Nitrite Urine      Leukocyte Esterase Urine      Bacteria Urine Moderate (A) None Seen /HPF    WBC Clumps Urine Present (A) None Seen /HPF    Mucus Urine Present (A) None Seen /LPF    RBC Urine >182 (H) <=2 /HPF    WBC Urine >182 (H) <=5 /HPF   Basic metabolic panel   Result Value Ref Range    Sodium 138 136 - 145 mmol/L    Potassium 4.1 3.4 - 5.3 mmol/L    Chloride 108 (H) 98 - 107 mmol/L    Carbon Dioxide (CO2) 22 22 - 29 mmol/L    Anion Gap 8 7 - 15 mmol/L    Urea Nitrogen 23.2 (H) 8.0 - 23.0 mg/dL    Creatinine 1.41 (H) 0.67 - 1.17 mg/dL    Calcium 9.0 8.8 - 10.2 mg/dL    Glucose 106 (H) 70 - 99 mg/dL    GFR Estimate 56 (L) >60 mL/min/1.73m2   CBC with platelets and differential   Result Value Ref Range    WBC Count 12.6 (H) 4.0 - 11.0 10e3/uL    RBC Count 4.31 (L) 4.40 - 5.90 10e6/uL    Hemoglobin 13.0 (L) 13.3 - 17.7 g/dL    Hematocrit 40.7 40.0 - 53.0 %    MCV 94 78 - 100 fL    MCH 30.2 26.5 - 33.0 pg    MCHC 31.9 31.5 - 36.5 g/dL    RDW 12.6 10.0 - 15.0 %    Platelet Count 213 150 - 450 10e3/uL    % Neutrophils 82 %    % Lymphocytes 7 %    % Monocytes 10 %    % Eosinophils 1 %    % Basophils 0 %    % Immature Granulocytes 0 %    NRBCs per 100 WBC 0 <1 /100    Absolute Neutrophils 10.3 (H) 1.6 - 8.3 10e3/uL    Absolute Lymphocytes 0.9 0.8 - 5.3 10e3/uL    Absolute Monocytes 1.3 0.0 - 1.3 10e3/uL    Absolute Eosinophils 0.1 0.0 - 0.7 10e3/uL    Absolute Basophils 0.0 0.0 - 0.2 10e3/uL    Absolute Immature Granulocytes 0.1 <=0.4 10e3/uL    Absolute NRBCs 0.0 10e3/uL       RADIOLOGY:  Reviewed all pertinent imaging. Please see official radiology report.  CT Abdomen Pelvis w/o & w Contrast   Final Result   IMPRESSION:    1.   Mucosal hyperenhancement and mural thickening of the bladder with mild perivesical fat edema. Findings are compatible with acute cystitis with some degree of underlying muscular hypertrophy also possible. Consider urology consultation.   2.   Kidneys, intrarenal collecting systems, ureters and bladder otherwise unremarkable.   3.  Mild prostate enlargement.             Meño Graham M.D.  Emergency Medicine  [facility] EMERGENCY DEPARTMENT  [address] [place] [id number] [phone]  Dept: [phone]       Meño Graahm MD  08/14/23 7775

## 2023-08-14 NOTE — ED NOTES
Patient has returned form CT scan. States that his urine is becoming lighter in color, still having some burning with urination.  Awaiting CT results.

## 2023-08-14 NOTE — ED TRIAGE NOTES
Patient reports burning with urination and an increase in frequency of urination over the past 2 days. This morning he woke up with blood in his urine. He reports he was up most the night trying to urinate. He has seen a urologist previously for urinary frequency.

## 2023-08-15 LAB
ATRIAL RATE - MUSE: 72 BPM
BACTERIA UR CULT: ABNORMAL
DIASTOLIC BLOOD PRESSURE - MUSE: 61 MMHG
INTERPRETATION ECG - MUSE: NORMAL
P AXIS - MUSE: 56 DEGREES
PR INTERVAL - MUSE: 174 MS
QRS DURATION - MUSE: 78 MS
QT - MUSE: 372 MS
QTC - MUSE: 407 MS
R AXIS - MUSE: -7 DEGREES
SYSTOLIC BLOOD PRESSURE - MUSE: 124 MMHG
T AXIS - MUSE: 66 DEGREES
VENTRICULAR RATE- MUSE: 72 BPM

## 2023-10-05 ENCOUNTER — HOSPITAL ENCOUNTER (INPATIENT)
Facility: HOSPITAL | Age: 63
LOS: 2 days | Discharge: HOME | End: 2023-10-07
Attending: EMERGENCY MEDICINE | Admitting: INTERNAL MEDICINE
Payer: COMMERCIAL

## 2023-10-05 DIAGNOSIS — K35.30 ACUTE APPENDICITIS WITH LOCALIZED PERITONITIS, WITHOUT PERFORATION, ABSCESS, OR GANGRENE: ICD-10-CM

## 2023-10-05 DIAGNOSIS — G89.18 POST-OP PAIN: Primary | ICD-10-CM

## 2023-10-05 LAB
HOLD SPECIMEN: NORMAL

## 2023-10-05 PROCEDURE — 258N000003 HC RX IP 258 OP 636: Performed by: EMERGENCY MEDICINE

## 2023-10-05 PROCEDURE — 250N000011 HC RX IP 250 OP 636: Mod: JZ | Performed by: EMERGENCY MEDICINE

## 2023-10-05 PROCEDURE — 99223 1ST HOSP IP/OBS HIGH 75: CPT | Performed by: INTERNAL MEDICINE

## 2023-10-05 PROCEDURE — 250N000011 HC RX IP 250 OP 636: Mod: JZ | Performed by: INTERNAL MEDICINE

## 2023-10-05 PROCEDURE — 96360 HYDRATION IV INFUSION INIT: CPT

## 2023-10-05 PROCEDURE — 120N000001 HC R&B MED SURG/OB

## 2023-10-05 PROCEDURE — 250N000013 HC RX MED GY IP 250 OP 250 PS 637: Performed by: INTERNAL MEDICINE

## 2023-10-05 PROCEDURE — 99221 1ST HOSP IP/OBS SF/LOW 40: CPT | Performed by: PHYSICIAN ASSISTANT

## 2023-10-05 PROCEDURE — 99285 EMERGENCY DEPT VISIT HI MDM: CPT | Mod: 25

## 2023-10-05 PROCEDURE — 93005 ELECTROCARDIOGRAM TRACING: CPT | Performed by: INTERNAL MEDICINE

## 2023-10-05 RX ORDER — TADALAFIL 20 MG/1
20 TABLET ORAL DAILY PRN
COMMUNITY
Start: 2023-07-07

## 2023-10-05 RX ORDER — ATORVASTATIN CALCIUM 40 MG/1
40 TABLET, FILM COATED ORAL EVERY EVENING
Status: DISCONTINUED | OUTPATIENT
Start: 2023-10-05 | End: 2023-10-07 | Stop reason: HOSPADM

## 2023-10-05 RX ORDER — BUPROPION HYDROCHLORIDE 150 MG/1
150 TABLET, EXTENDED RELEASE ORAL DAILY
COMMUNITY
End: 2023-11-14

## 2023-10-05 RX ORDER — BUPROPION HYDROCHLORIDE 150 MG/1
150 TABLET, EXTENDED RELEASE ORAL DAILY
Status: DISCONTINUED | OUTPATIENT
Start: 2023-10-06 | End: 2023-10-07 | Stop reason: HOSPADM

## 2023-10-05 RX ORDER — MORPHINE SULFATE 4 MG/ML
4 INJECTION, SOLUTION INTRAMUSCULAR; INTRAVENOUS ONCE
Status: COMPLETED | OUTPATIENT
Start: 2023-10-05 | End: 2023-10-05

## 2023-10-05 RX ORDER — CARVEDILOL 12.5 MG/1
25 TABLET ORAL 2 TIMES DAILY WITH MEALS
COMMUNITY

## 2023-10-05 RX ORDER — NALOXONE HYDROCHLORIDE 0.4 MG/ML
0.4 INJECTION, SOLUTION INTRAMUSCULAR; INTRAVENOUS; SUBCUTANEOUS
Status: DISCONTINUED | OUTPATIENT
Start: 2023-10-05 | End: 2023-10-07 | Stop reason: HOSPADM

## 2023-10-05 RX ORDER — ONDANSETRON 4 MG/1
4 TABLET, ORALLY DISINTEGRATING ORAL EVERY 6 HOURS PRN
Status: DISCONTINUED | OUTPATIENT
Start: 2023-10-05 | End: 2023-10-07 | Stop reason: HOSPADM

## 2023-10-05 RX ORDER — ONDANSETRON 2 MG/ML
4 INJECTION INTRAMUSCULAR; INTRAVENOUS ONCE
Status: COMPLETED | OUTPATIENT
Start: 2023-10-05 | End: 2023-10-05

## 2023-10-05 RX ORDER — DARIDOREXANT 25 MG/1
12.5 TABLET, FILM COATED ORAL AT BEDTIME
COMMUNITY

## 2023-10-05 RX ORDER — NALOXONE HYDROCHLORIDE 0.4 MG/ML
0.2 INJECTION, SOLUTION INTRAMUSCULAR; INTRAVENOUS; SUBCUTANEOUS
Status: DISCONTINUED | OUTPATIENT
Start: 2023-10-05 | End: 2023-10-07 | Stop reason: HOSPADM

## 2023-10-05 RX ORDER — PIPERACILLIN SODIUM, TAZOBACTAM SODIUM 3; .375 G/15ML; G/15ML
3.38 INJECTION, POWDER, LYOPHILIZED, FOR SOLUTION INTRAVENOUS EVERY 8 HOURS
Status: DISCONTINUED | OUTPATIENT
Start: 2023-10-05 | End: 2023-10-07

## 2023-10-05 RX ORDER — ONDANSETRON 2 MG/ML
4 INJECTION INTRAMUSCULAR; INTRAVENOUS EVERY 6 HOURS PRN
Status: DISCONTINUED | OUTPATIENT
Start: 2023-10-05 | End: 2023-10-07 | Stop reason: HOSPADM

## 2023-10-05 RX ORDER — ATORVASTATIN CALCIUM 40 MG/1
40 TABLET, FILM COATED ORAL EVERY EVENING
COMMUNITY
Start: 2023-09-27

## 2023-10-05 RX ORDER — MORPHINE SULFATE 2 MG/ML
1 INJECTION, SOLUTION INTRAMUSCULAR; INTRAVENOUS
Status: DISCONTINUED | OUTPATIENT
Start: 2023-10-05 | End: 2023-10-06

## 2023-10-05 RX ADMIN — ONDANSETRON 4 MG: 2 INJECTION INTRAMUSCULAR; INTRAVENOUS at 20:16

## 2023-10-05 RX ADMIN — PIPERACILLIN AND TAZOBACTAM 3.38 G: 3; .375 INJECTION, POWDER, LYOPHILIZED, FOR SOLUTION INTRAVENOUS at 22:17

## 2023-10-05 RX ADMIN — MAGNESIUM 64 MG (MAGNESIUM CHLORIDE) TABLET,DELAYED RELEASE 535 MG: at 22:55

## 2023-10-05 RX ADMIN — MORPHINE SULFATE 1 MG: 2 INJECTION, SOLUTION INTRAMUSCULAR; INTRAVENOUS at 20:16

## 2023-10-05 RX ADMIN — ONDANSETRON 4 MG: 2 INJECTION INTRAMUSCULAR; INTRAVENOUS at 15:54

## 2023-10-05 RX ADMIN — ATORVASTATIN CALCIUM 40 MG: 40 TABLET, FILM COATED ORAL at 22:55

## 2023-10-05 RX ADMIN — MORPHINE SULFATE 4 MG: 4 INJECTION, SOLUTION INTRAMUSCULAR; INTRAVENOUS at 15:45

## 2023-10-05 RX ADMIN — MORPHINE SULFATE 1 MG: 2 INJECTION, SOLUTION INTRAMUSCULAR; INTRAVENOUS at 22:19

## 2023-10-05 RX ADMIN — GABAPENTIN 700 MG: 300 CAPSULE ORAL at 22:55

## 2023-10-05 RX ADMIN — SODIUM CHLORIDE 1000 ML: 9 INJECTION, SOLUTION INTRAVENOUS at 15:00

## 2023-10-05 ASSESSMENT — ACTIVITIES OF DAILY LIVING (ADL)
ADLS_ACUITY_SCORE: 35

## 2023-10-05 NOTE — ED TRIAGE NOTES
Pt presents to ED with 3 days of generalized abdominal pain. Felt this was likely constipation. Took magnesium citrate yesterday. Pain worse in RLQ.     IV antibiotic right before coming to ED, seen at clinic. Appendicitis   HX chronic kidney disease

## 2023-10-05 NOTE — ED PROVIDER NOTES
EMERGENCY DEPARTMENT ENCOUNTER      NAME: Kiko Tovar  AGE: 63 year old male  YOB: 1960  MRN: [id number]  EVALUATION DATE & TIME: No admission date for patient encounter.    PCP: Lissette Gonzales    ED PROVIDER: Gentry Talley D.O.      Chief Complaint   Patient presents with    Abdominal Pain       FINAL IMPRESSION:  1. Acute appendicitis with localized peritonitis, without perforation, abscess, or gangrene        ED COURSE & MEDICAL DECISION MAKIN:26 PM I met with the patient to gather history and to perform my initial exam. I discussed the plan for care while in the Emergency Department.  2:58 PM Spoke with General Surgery, Dr. Horton, who wants me to call the Hospitalist.  3:36 PM Spoke with the Hospitalist, Dr. Chavez, who accepts the patient for admission.          Pertinent Labs & Imaging studies reviewed. (See chart for details)    63 year old male presents to the Emergency Department for evaluation of right lower quadrant abdominal pain for the past 2 days.  Patient was seen at the emergency room, was found there to have mildly elevated white count, and CT scan consistent with appendicitis with questionable potential perforation.  He was given Zosyn at the emergency room as well.  At this time, plan is for admission for surgical intervention.  Due to the patient's underlying medical status with significant history of hypertrophic cardiomyopathy and chronic renal disease, will admit to the hospitalist service for further management.    Medical Decision Making    History:  Supplemental history from: Documented in chart, if applicable  External Record(s) reviewed: Documented in chart, if applicable.    Work Up:  Chart documentation includes differential considered and any EKGs or imaging independently interpreted by provider, where specified.  In additional to work up documented, I considered the following work up: Documented in chart, if applicable.    External  consultation:  Discussion of management with another provider: Documented in chart, if applicable    Complicating factors:  Care impacted by chronic illness: Chronic Kidney Disease, Heart Disease, and Hypertension  Care affected by social determinants of health: N/A    Disposition considerations: Admit.        At the conclusion of the encounter I discussed the results of all of the tests and the disposition. The questions were answered. The patient or family acknowledged understanding and was agreeable with the care plan.        HPI    Patient information was obtained from: Patient    Use of : N/A     Kiko Tovar is a 63 year old male with a pertinent medical history of CKD, acute kidney failure, aneurysm of ascending aorta, hypertrophic cardiomyopathy, coronary atherosclerosis, HTN, dyslipidemia, who presents to the ED for evaluation of abdominal pain.    Per Chart Review: Patient was seen at [facility] [place] on 10/05/23 for evaluation of approximately 7 days worth of abdominal pain. A/G ratio 1.3, anion gap 7, BUN 25, creatinine 1.43, eGFR 55, WBC 12.5, MCH 31.6, all other labs otherwise unremarkable. CT scan confirmed appendicitis with possible perforation with contained abscess at the tip of the appendix or just a very localized enlarged appendiceal tip. Patient was then provided Zosyn and was discharged with instructions to immediately head to the [facility] ED.    CT Abdomen Pelvis W IV Contrast had the following impression: Findings consistent with acute appendicitis. The appendix is moderately fluid filled with diffuse circumferential wall thickening. There is a segment of the distal appendix measuring 3 cm. This is either a contained perforation versus a long dilated inflamed appendix. Additionally, given the degree of dilation and wall thickening of the appendix an underlying mucocele remains in the differential. Surgical consultation is recommended.     Patient  "endorses the history above. He notes that he went to [facility] due to recent progressively worsening abdominal pain with associated constipation. He endorses taking Magnesium Citrate at approximately 11:00 PM last night for his symptoms, but he denies it providing any relief. He endorses generalized abdominal pain within the ED, but worst at the right lower quadrant. He endorses a PMH of CKD, hypertropic cardiomyopathy, and cervical spine generation. He endorses allergies to Amlodipine. He denies any surgical history. He denies ay smoking or alcohol use He denies any recent fever, or any other complications at this time.     REVIEW OF SYSTEMS  Constitutional:  Denies fever, chills, weight loss or weakness  Eyes:  No pain, discharge, redness  HENT:  Denies sore throat, ear pain, congestion  Respiratory: No SOB, wheeze or cough  Cardiovascular:  No CP, palpitations  GI:  Positive for abdominal pain. Positive for constipation. Denies nausea, vomiting, diarrhea  : Denies dysuria, hematuria  Musculoskeletal:  Denies any new muscle/joint pain, swelling or loss of function.  Skin:  Denies rash, pallor  Neurologic:  Denies headache, focal weakness or sensory changes  Lymph: Denies swollen nodes    All other systems negative unless noted in HPI.    PAST MEDICAL HISTORY:  Past Medical History:   Diagnosis Date    ASHLEY positive 12/29/2014    Ascending aortic aneurysm (H24) 12/06/2016    3.8 cm by CTA March 12, 2019    Asymmetric septal hypertrophy (H) 01/11/2017    Mild by MRI; no further workup needed per Dr. Gardner.\"The findings indicates a focal hypertrophic cardiomyopathy in basal anteroseptal wall (1.9 cm).\"    Coronary atherosclerosis due to calcified coronary lesion 2/11/2016    Dyslipidemia, goal LDL below 100 6/2/2021    Essential hypertension 1/11/2017    GE reflux     Lyme disease 2010    he had an iv port to treat Lyme disease wth chronic antibiotics for six months by Dr. Lilian Alexander at [facility]       PAST SURGICAL HISTORY:  Past Surgical History:   Procedure Laterality Date    IR MISCELLANEOUS PROCEDURE  2010    IR PORT PLACEMENT >5 YEARS  2010    IR PORT REMOVAL           CURRENT MEDICATIONS:    No current facility-administered medications for this encounter.     Current Outpatient Medications   Medication    amitriptyline (ELAVIL) 50 MG tablet    aspirin (ASA) 81 MG EC tablet    atorvastatin (LIPITOR) 20 MG tablet    carvedilol (COREG) 12.5 MG tablet    carvedilol (COREG) 3.125 MG tablet    cefdinir (OMNICEF) 300 MG capsule    gabapentin (NEURONTIN) 100 MG capsule    gabapentin (NEURONTIN) 300 MG capsule    isosorbide mononitrate (IMDUR) 30 MG 24 hr tablet    Magnesium Chloride POWD    melatonin 1 MG TABS tablet    senna (SENOKOT) 8.6 MG tablet    UNABLE TO FIND    Vitamin D-Vitamin K (VITAMIN K2-VITAMIN D3 PO)         ALLERGIES:  Allergies   Allergen Reactions    Amlodipine      Edema in feet    Gluten Meal     Isordil [Isosorbide Nitrate] Headache    Adhesive Tape Rash       FAMILY HISTORY:  Family History   Problem Relation Age of Onset    Chronic Kidney Disease Mother     Hypertension Father     Peripheral Vascular Disease Father 82.00    Dementia Father     Other - See Comments Father 82.00         one week after a fall at home;  in     No Known Problems Sister     Cancer Brother         laryngeal and esophageal cancer    No Known Problems Brother     No Known Problems Brother        SOCIAL HISTORY:  Social History     Socioeconomic History    Marital status:     Number of children: 0    Years of education: 16   Tobacco Use    Smoking status: Never    Smokeless tobacco: Never   Substance and Sexual Activity    Alcohol use: Yes     Comment: Alcoholic Drinks/day: rarely   Social History Narrative    Lives alone; bachelors in music from [employer]. He hikes 35 minutes four times a week in .       VITALS:  Patient Vitals for the past 24 hrs:   BP Temp  "Temp src Pulse Resp SpO2 Height Weight   10/05/23 1554 (!) 142/72 -- -- 65 -- 100 % -- --   10/05/23 1536 -- -- -- -- -- -- 1.651 m (5' 5\") 68 kg (150 lb)   10/05/23 1530 137/66 -- -- -- -- -- -- --   10/05/23 1529 -- -- -- 65 -- 100 % -- --   10/05/23 1432 -- -- -- -- -- -- -- 67.9 kg (149 lb 9.6 oz)   10/05/23 1430 119/76 97  F (36.1  C) Oral 65 16 99 % -- --       PHYSICAL EXAM    VITAL SIGNS: BP (!) 142/72   Pulse 65   Temp 97  F (36.1  C) (Oral)   Resp 16   Ht 1.651 m (5' 5\")   Wt 68 kg (150 lb)   SpO2 100%   BMI 24.96 kg/m      General Appearance: Well-appearing, well-nourished, no acute distress   Head:  Normocephalic, without obvious abnormality, atraumatic  Eyes:  PERRL, conjunctiva/corneas clear, EOM's intact,  ENT:  Lips, mucosa, and tongue normal, membranes are moist without pallor  Neck:  Normal ROM, symmetrical, trachea midline    Cardio:  Regular rate and rhythm, no murmur, rub or gallop, 2+ pulses symmetric in all extremities  Pulm:  Clear to auscultation bilaterally, respirations unlabored,  Abdomen:  Right lower quadrant abdominal pain with rebound and guarding.   Musculoskeletal: Full ROM, no edema, no cyanosis, good ROM of major joints  Integument:  Warm, Dry, No erythema, No rash.    Neurologic:  Alert & oriented.  No focal deficits appreciated.  Ambulatory.  Psychiatric:  Affect normal, Judgment normal, Mood normal.      LABS  Results for orders placed or performed during the hospital encounter of 10/05/23 (from the past 24 hour(s))   [place] Draw    Narrative    The following orders were created for panel order [place] Draw.  Procedure                               Abnormality         Status                     ---------                               -----------         ------                     Extra Blue Top Tube[[id number]]                              Final result               Extra Red Top Tube[[id number]]                               Final result               Extra Green Top " (Lithium...[[id number]]                      Final result               Extra Purple Top Tube[[id number]]                            Final result                 Please view results for these tests on the individual orders.   Extra Blue Top Tube   Result Value Ref Range    Hold Specimen JIC    Extra Red Top Tube   Result Value Ref Range    Hold Specimen JIC    Extra Green Top (Lithium Heparin) Tube   Result Value Ref Range    Hold Specimen JIC    Extra Purple Top Tube   Result Value Ref Range    Hold Specimen JIC          RADIOLOGY  No orders to display            MEDICATIONS GIVEN IN THE EMERGENCY:  Medications   sodium chloride 0.9% BOLUS 1,000 mL (0 mLs Intravenous Stopped 10/5/23 1547)   morphine (PF) injection 4 mg (4 mg Intravenous $Given 10/5/23 1545)   ondansetron (ZOFRAN) injection 4 mg (4 mg Intravenous $Given 10/5/23 1554)       NEW PRESCRIPTIONS STARTED AT TODAY'S ER VISIT  New Prescriptions    No medications on file        I, Mayco Keith, am serving as a scribe to document services personally performed by Gentry Talley D.O. based on my observations and the provider's statements to me.  I,   Gentry Talley D.O., attest that Mayco Keith is acting in a scribe capacity, has observed my performance of the services and has documented them in accordance with my direction.     Gentry Talley D.O.  Emergency Medicine  [facility] EMERGENCY DEPARTMENT  [facility] [facility] [address]  [phone]  Dept: [phone]       Gentry Talley,   10/05/23 3601

## 2023-10-05 NOTE — PHARMACY-ADMISSION MEDICATION HISTORY
Pharmacist Admission Medication History    Admission medication history is complete. The information provided in this note is only as accurate as the sources available at the time of the update.    Information Source(s): Patient and CareEverywhere/SureScripts via in-person    Pertinent Information: No pharmacy fill history found for Quviviq but patient states he will have family bring in own supply of Quviviq & Camzyos since we don't stock these medications in the hospital. He recently completed a 4 day coarse of prednisone 20mg daily prescribed 9/28/23.     Changes made to PTA medication list:  Added: Camzyos, Quviviq, Tadalafil, Bupropion  Deleted: amitriptyline 50mg, aspirin 81mg, cefdinir 300mg BID, imdur, melatonin  Changed: atorvastatin 20mg to 40mg, carvedilol 15.625 mg BID to 25mg BID, vitamin D 5000 units daily to 3 times weekly    Medication Affordability:  Not including over the counter (OTC) medications, was there a time in the past 3 months when you did not take your medications as prescribed because of cost?: No    Allergies reviewed with patient and updates made in EHR: yes    Medication History Completed By: Ana Maria Penny, PharmD 10/5/2023 6:46 PM    Prior to Admission medications    Medication Sig Last Dose Taking? Auth Provider Long Term End Date   atorvastatin (LIPITOR) 40 MG tablet Take 40 mg by mouth every evening 10/4/2023 at PM Yes Unknown, Entered By History No    buPROPion (WELLBUTRIN SR) 150 MG 12 hr tablet Take 150 mg by mouth daily 10/5/2023 at 1300 Yes Unknown, Entered By History Yes    Daridorexant HCl (QUVIVIQ) 25 MG TABS Take 12.5 mg by mouth at bedtime 10/4/2023 at HS Yes Unknown, Entered By History     gabapentin (NEURONTIN) 100 MG capsule Take 100 mg by mouth every evening Take along with 2 capsules of 300mg capsules for total 700mg daily 10/4/2023 at HS Yes Reported, Patient Yes    gabapentin (NEURONTIN) 300 MG capsule Take 600 mg by mouth every evening Along with 100mg  capsule for total 700mg daily 10/4/2023 at HS Yes Reported, Patient Yes    Magnesium Chloride POWD Take 0.5 teaspoonful by mouth 2 times daily 10/5/2023 at 1300 Yes Reported, Patient     mavacamten (CAMZYOS) 2.5 MG CAPS Take 2.5 mg by mouth daily 10/5/2023 at 1300 Yes Unknown, Entered By History     senna (SENOKOT) 8.6 MG tablet Take 2 tablets by mouth daily as needed for constipation Past Week at prn Yes Reported, Patient     tadalafil (CIALIS) 20 MG tablet Take 20 mg by mouth daily as needed (erectile dysfunction) Unknown at prn Yes Unknown, Entered By History Yes    UNABLE TO FIND Take 2 capsules by mouth daily as needed (constipation) MEDICATION NAME: [place] 250 (250mg) Past Month at prn Yes Reported, Patient     Vitamin D-Vitamin K (VITAMIN K2-VITAMIN D3 PO) Take 5,000 Units by mouth three times a week  at haven't started yet Yes Reported, Patient

## 2023-10-05 NOTE — H&P
Admission History and Physical   Kiko Tovar,    1960,   [id number]    [facility]   Acute appendicitis with localized peritonitis, without perforation, abscess, or gangrene [K35.30]    PCP: Lissette Gonzales,    Code status:  Full Code       Extended Emergency Contact Information  Primary Emergency Contact: JOSHUA MADISON  Mobile Phone: [phone]  Relation: Significant other  Secondary Emergency Contact: Emma Hernadez   [facility]  Mobile Phone: [phone]  Relation: Sister       Principal Problem:    Acute appendicitis with localized peritonitis, without perforation, abscess, or gangrene       ASSESSMENT AND PLAN:  Abdominal pain, CT showing inflamed appendix, possible contained perforation, surgery consulted  Hypertrophic cardiomyopathy, compensated.  Follows with [facility]  Cardiomyopathy most recent LVEF 70 to 75% last year  CKD stage III, follow BMP daily, avoid nephrotoxins  Benign essential hypertension, currently controlled  GERD, stable    Plan  Admit to cardiac monitoring  Check preop EKG  Home medication resumed as appropriate  N.p.o. and IV hydration  Begin IV antibiotics  Begin IV as needed analgesia  Follow surgical recommendations      Patient walks 4 miles a day with no exertional chest pain or shortness of breath.  Had an angiogram in  that showed minimal CAD.  In view of patient's perforation any procedure will be classed as emergency therefore he is medically optimized to proceed to surgery if needed, this is not without risk patient is okay with this      DVT PPX:  Mechanical    Barriers to discharge    Anticipated Discharge date inpatient multiple days          Chief Complaint:  Abdominal pain a few weeks    HPI:  Kiko Tovar is a 63 year old old male with right lower quadrant pain a few weeks duration with associated nausea and vomiting.  No fever or chills.  Patient has worsening symptoms and went to urgent care for evaluation.  A CT scan  "showing inflamed appendix with possible perforation.  He was told to come to this ED for evaluation by surgery.  Patient still has abdominal pain 5/10 in intensity, nonradiating, constant, no exacerbating factors.  Patient has no chest pain or shortness of breath , Exercise tolerance is unchanged he walks 4 miles a day with no issues surgery was consulted he is admitted for evaluation by surgery      Medical History  Past Medical History:   Diagnosis Date    ASHLEY positive 12/29/2014    Ascending aortic aneurysm (H24) 12/06/2016    3.8 cm by CTA March 12, 2019    Asymmetric septal hypertrophy (H) 01/11/2017    Mild by MRI; no further workup needed per Dr. Gardner.\"The findings indicates a focal hypertrophic cardiomyopathy in basal anteroseptal wall (1.9 cm).\"    Coronary atherosclerosis due to calcified coronary lesion 2/11/2016    Dyslipidemia, goal LDL below 100 6/2/2021    Essential hypertension 1/11/2017    GE reflux     Lyme disease 2010    he had an iv port to treat Lyme disease wth chronic antibiotics for six months by Dr. Lilian Alexander at [facility]          Surgical History  He  has a past surgical history that includes IR Miscellaneous Procedure (1/27/2010); Ir Port Placement >5 Years (2010); and Ir Port Removal (2011).      SOCIAL HISTORY:  Social History     Socioeconomic History    Marital status:      Spouse name: Not on file    Number of children: 0    Years of education: 16    Highest education level: Not on file   Occupational History    Not on file   Tobacco Use    Smoking status: Never    Smokeless tobacco: Never   Substance and Sexual Activity    Alcohol use: Yes     Comment: Alcoholic Drinks/day: rarely    Drug use: Not on file    Sexual activity: Not on file   Other Topics Concern    Not on file   Social History Narrative    Lives alone; [place] [place] [employer] [place] [employer] [place] [employer]. He hikes 35 minutes four times a week in 2020.     Social Determinants of Health " "    Financial Resource Strain: Not on file   Food Insecurity: Not on file   Transportation Needs: Not on file   Physical Activity: Not on file   Stress: Not on file   Social Connections: Not on file   Interpersonal Safety: Not on file   Housing Stability: Not on file       FAMILY HISTORY:  Family History   Problem Relation Age of Onset    Chronic Kidney Disease Mother     Hypertension Father     Peripheral Vascular Disease Father 82.00    Dementia Father     Other - See Comments Father 82.00         one week after a fall at home;  in     No Known Problems Sister     Cancer Brother         laryngeal and esophageal cancer    No Known Problems Brother     No Known Problems Brother          ALLERGIES:  Allergies   Allergen Reactions    Amlodipine      Edema in feet    Gluten Meal     Isordil [Isosorbide Nitrate] Headache    Adhesive Tape Rash       MEDICATIONS: Please see pharmacy note        ROS:  12 point review of systems reviewed and is negative except as stated above      PHYSICAL EXAM:  BP (!) 142/72   Pulse 65   Temp 97  F (36.1  C) (Oral)   Resp 16   Ht 1.651 m (5' 5\")   Wt 68 kg (150 lb)   SpO2 100%   BMI 24.96 kg/m      General: Alert and oriented x 3. Not in obvious distress.  HEENT: Pupils equal and reactive,ENT WNL   Neck- supple, No JVP elevation, lymphadenopathy or thyromegaly. Trachea-central.  Chest: Clear to auscultation bilaterally.  Heart: S1S2 regular. No M/R/G.  Abdomen: Soft.  Mild right lower quadrant tenderness with some guarding, ND. No organomegaly. Bowel sounds- active.  Back: No spine tenderness. No CVA tenderness.  Extremities: No leg swelling. Peripheral pulses 2+ bilaterally.  Neuro: No focal neurological deficit  Skin: no skin rashes     DIAGNOSTIC DATA: Case discussed extensively ED physician      EKG Results: Currently pending, will be personally reviewed        Advanced Care Planning       Long Chavez MD     "

## 2023-10-05 NOTE — CONSULTS
"General Surgery Consultation  Kiko Tovar MRN# [id number]   Age/Sex: 63 year old male YOB: 1960     Reason for consult: 1. Acute appendicitis with localized peritonitis, without perforation, abscess, or gangrene            Requesting physician: Dr Chavez                   Assessment and Plan:   Assessment:  Right lower quadrant abdominal pain with acute appendicitis  Focal hypertrophic cardiomyopathy    Plan:  -Reviewed results and that we are waiting on imaging.  Clinically this appears to be an acute appendicitis.  Will discuss further with Dr. Garcia and if medically cleared then okay to proceed with surgery.  Patient agrees with plan.          Chief Complaint:     Chief Complaint   Patient presents with    Abdominal Pain        History is obtained from the patient    HPI:   Kiko Tovar is a 63 year old male with history of HCM, Lyme's treated with IV antibiotics with port placement, hyperlipidemia, no abdominal surgeries who presents with increasing abdominal pain.  Pain started about a week or 2 weeks ago.  He felt that he was just constipated.  Was having small hard stools.  His pain started increasing and he has pain on this right as well as left lower quadrant radiating to the back.  He denied any fever, chills, chest pain, shortness of breath, nausea or vomiting.  Tried magnesium citrate and now has diarrhea.  Presented to the emergency room and was diagnosed with appendicitis and sent to [facility].  Denies any issues with anesthesia even though he has not had much other than port placement.  And denies any chest pain or shortness of breath with activity.          Past Medical History:     Past Medical History:   Diagnosis Date    ASHLEY positive 12/29/2014    Ascending aortic aneurysm (H24) 12/06/2016    3.8 cm by CTA March 12, 2019    Asymmetric septal hypertrophy (H) 01/11/2017    Mild by MRI; no further workup needed per Dr. Gardner.\"The findings indicates a focal hypertrophic " "cardiomyopathy in basal anteroseptal wall (1.9 cm).\"    Coronary atherosclerosis due to calcified coronary lesion 2016    Dyslipidemia, goal LDL below 100 2021    Essential hypertension 2017    GE reflux     Lyme disease     he had an iv port to treat Lyme disease wth chronic antibiotics for six months by Dr. Lilian Alexander at [facility]              Past Surgical History:     Past Surgical History:   Procedure Laterality Date    IR MISCELLANEOUS PROCEDURE  2010    IR PORT PLACEMENT >5 YEARS      IR PORT REMOVAL               Social History:    reports that he has never smoked. He has never used smokeless tobacco. He reports current alcohol use.           Family History:     Family History   Problem Relation Age of Onset    Chronic Kidney Disease Mother     Hypertension Father     Peripheral Vascular Disease Father 82.00    Dementia Father     Other - See Comments Father 82.00         one week after a fall at home;  in     No Known Problems Sister     Cancer Brother         laryngeal and esophageal cancer    No Known Problems Brother     No Known Problems Brother               Allergies:     Allergies   Allergen Reactions    Amlodipine      Edema in feet    Gluten Meal     Isordil [Isosorbide Nitrate] Headache    Adhesive Tape Rash              Medications:     Prior to Admission medications    Medication Sig Start Date End Date Taking? Authorizing Provider   amitriptyline (ELAVIL) 50 MG tablet Take 50 mg by mouth At Bedtime    Reported, Patient   aspirin (ASA) 81 MG EC tablet Take 81 mg by mouth daily    Reported, Patient   atorvastatin (LIPITOR) 20 MG tablet Take 20 mg by mouth daily    Reported, Patient   carvedilol (COREG) 12.5 MG tablet TAKE 1 TABLET BY MOUTH TWICE DAILY(TAKE WITH 3.125MG TABLET). TAKE WITH MEALS 5/10/22   Gretchen Dave MD   carvedilol (COREG) 3.125 MG tablet TAKE 1 TABLET BY MOUTH TWICE DAILY WITH MEALS 22   Gretchen Dave MD " "  cefdinir (OMNICEF) 300 MG capsule Take 1 capsule (300 mg) by mouth 2 times daily 8/14/23   Meño Graham MD   gabapentin (NEURONTIN) 100 MG capsule Take 100 mg by mouth daily Take 100 mg tab with 2 300mg tabs to total 700mg daily    Reported, Patient   gabapentin (NEURONTIN) 300 MG capsule Take 600 mg by mouth daily    Reported, Patient   isosorbide mononitrate (IMDUR) 30 MG 24 hr tablet Take 1 tablet (30 mg) by mouth daily 10/4/22   Gretchen Dave MD   Magnesium Chloride POWD 0.5 teaspoonful 2 times daily as needed    Reported, Patient   melatonin 1 MG TABS tablet Take 1 mg by mouth    Reported, Patient   senna (SENOKOT) 8.6 MG tablet Take 2 tablets by mouth daily as needed    Reported, Patient   UNABLE TO FIND Take 2 capsules by mouth daily MEDICATION NAME: Super Aloe 250 (250mg)    Reported, Patient   Vitamin D-Vitamin K (VITAMIN K2-VITAMIN D3 PO) Take 5,000 Units by mouth daily    Reported, Patient              Review of Systems:   The Review of Systems is negative other than noted in the HPI            Physical Exam:   Patient Vitals for the past 24 hrs:   BP Temp Temp src Pulse Resp SpO2 Height Weight   10/05/23 1554 (!) 142/72 -- -- 65 -- 100 % -- --   10/05/23 1536 -- -- -- -- -- -- 1.651 m (5' 5\") 68 kg (150 lb)   10/05/23 1530 137/66 -- -- -- -- -- -- --   10/05/23 1529 -- -- -- 65 -- 100 % -- --   10/05/23 1432 -- -- -- -- -- -- -- 67.9 kg (149 lb 9.6 oz)   10/05/23 1430 119/76 97  F (36.1  C) Oral 65 16 99 % -- --        No intake or output data in the 24 hours ending 10/05/23 1706   Constitutional:   awake, alert, cooperative, no apparent distress, and appears stated age       Eyes:   PERRL, conjunctiva/corneas clear, EOM's intact; no scleral edema or icterus noted        ENT:   Normocephalic, without obvious abnormality, atraumatic, Lips, mucosa, and tongue normal        Hematologic / Lymphatic:   No lymphadenopathy       Lungs:   Normal respiratory effort, no accessory muscle use "       Cardiovascular:   Regular rate and rhythm       Abdomen:   Soft tender right lower quadrant more so than left lower quadrant and with some localized rebound       Musculoskeletal:   No obvious swelling, bruising or deformity       Skin:   Skin color and texture normal for patient, no rashes or lesions              Data:         All imaging studies reviewed by me.  Reviewed reports only as images are not available.  We did request for images to be sent for viewing.    onsistent with acute appendicitis. The appendix is moderately fluid filled with diffuse circumferential wall thickening. There is a segment of the distal appendix measuring 3 cm. This is either a contained perforation versus a long dilated inflamed appendix. Additionally, given the degree of dilation and wall thickening of the appendix an underlying mucocele remains in the differential. Surgical consultation is recommended.  Narrative    For Patients: As a result of the 21st Century Cures Act, medical imaging exams and procedure reports are released immediately into your electronic medical record. You may view this report before your referring provider. If you have questions, please contact your health care provider.    EXAM: CT ABDOMEN PELVIS W  LOCATION: [facility] [place]  DATE: 10/5/2023    INDICATION: Abdominal pain, acute, nonlocalized  COMPARISON: 08/14/2023.  TECHNIQUE: CT scan of the abdomen and pelvis was performed following injection of IV contrast. Multiplanar reformats were obtained. Dose reduction techniques were used.  CONTRAST: IOPAMIDOL 300 MG/ML  ML BOTTLE: 100mL    FINDINGS:  LOWER CHEST: Normal.    HEPATOBILIARY: Small 1 cm hemangioma in the right lobe of the liver on series 2 image 43. There are a few benign hepatic cysts.    PANCREAS: Normal.    SPLEEN: Normal.    ADRENAL GLANDS: Normal.    KIDNEYS/BLADDER: No significant mass, stone, or hydronephrosis.    BOWEL: Multiple cecum seen in the right upper  quadrant. There is a fluid-filled dilated appendix in the central abdomen with moderate circumferential wall thickening. There are moderate surrounding inflammatory changes. There is question of a contained perforation versus long dilated appendix on series 4 image 45 measuring a length of 3 cm. There are likely adhesions to the adjacent bowel. No free air or drainable collection.    LYMPH NODES: Normal.    VASCULATURE: Unremarkable.    PELVIC ORGANS: Normal.    MUSCULOSKELETAL: Small fat-containing left inguinal hernia.  Exam End: 10/05/23 12:35 PM    Specimen Collected: 10/05/23 12:35 PM Last Resulted: 10/05/23  1:05 PM   Received From: [facility]  Result Received: 10/05/23  2:22 PM      Results for orders placed or performed during the hospital encounter of 10/05/23 (from the past 24 hour(s))   [place] Draw    Narrative    The following orders were created for panel order [facility] Draw.  Procedure                               Abnormality         Status                     ---------                               -----------         ------                     Extra Blue Top Tube[[id number]]                              Final result               Extra Red Top Tube[[id number]]                               Final result               Extra Green Top (Lithium...[[id number]]                      Final result               Extra Purple Top Tube[[id number]]                            Final result                 Please view results for these tests on the individual orders.   Extra Blue Top Tube   Result Value Ref Range    Hold Specimen JIC    Extra Red Top Tube   Result Value Ref Range    Hold Specimen JIC    Extra Green Top (Lithium Heparin) Tube   Result Value Ref Range    Hold Specimen JIC    Extra Purple Top Tube   Result Value Ref Range    Hold Specimen JIC         LINO Coronado  [facility]  [address]  [place] [id number]  Phone- [phone]  Fax- [phone]

## 2023-10-05 NOTE — ED NOTES
Expected Patient Referral to ED  1:34 PM    Referring Clinic/Provider:  [facility]    Reason for referral/Clinical facts:  Patient with 2 weeks of progressive right lower quadrant pain.  CT there shows inflamed appendix, possibly with a contained perforation versus more prominent segment.  Given duration of symptoms, provider is concerned about possible perforated appendicitis being more likely.  Given Zosyn.  Vital signs stable.  No beds for direct admission.    Recommendations provided:  Send to ED for further evaluation          Kt Preston MD  [facility] EMERGENCY DEPARTMENT  [address] [place] [id number] [phone]       Kt Preston MD  10/05/23 2903

## 2023-10-06 ENCOUNTER — ANESTHESIA EVENT (OUTPATIENT)
Dept: SURGERY | Facility: HOSPITAL | Age: 63
End: 2023-10-06
Payer: COMMERCIAL

## 2023-10-06 ENCOUNTER — ANESTHESIA (OUTPATIENT)
Dept: SURGERY | Facility: HOSPITAL | Age: 63
End: 2023-10-06
Payer: COMMERCIAL

## 2023-10-06 LAB
ANION GAP SERPL CALCULATED.3IONS-SCNC: 10 MMOL/L (ref 7–15)
ATRIAL RATE - MUSE: 60 BPM
BUN SERPL-MCNC: 22.5 MG/DL (ref 8–23)
CALCIUM SERPL-MCNC: 8.1 MG/DL (ref 8.8–10.2)
CHLORIDE SERPL-SCNC: 105 MMOL/L (ref 98–107)
CREAT SERPL-MCNC: 1.45 MG/DL (ref 0.67–1.17)
DEPRECATED HCO3 PLAS-SCNC: 22 MMOL/L (ref 22–29)
DIASTOLIC BLOOD PRESSURE - MUSE: NORMAL MMHG
EGFRCR SERPLBLD CKD-EPI 2021: 54 ML/MIN/1.73M2
ERYTHROCYTE [DISTWIDTH] IN BLOOD BY AUTOMATED COUNT: 12.5 % (ref 10–15)
GLUCOSE BLDC GLUCOMTR-MCNC: 74 MG/DL (ref 70–99)
GLUCOSE BLDC GLUCOMTR-MCNC: 97 MG/DL (ref 70–99)
GLUCOSE SERPL-MCNC: 85 MG/DL (ref 70–99)
HCT VFR BLD AUTO: 37.6 % (ref 40–53)
HGB BLD-MCNC: 12.2 G/DL (ref 13.3–17.7)
INTERPRETATION ECG - MUSE: NORMAL
MCH RBC QN AUTO: 30 PG (ref 26.5–33)
MCHC RBC AUTO-ENTMCNC: 32.4 G/DL (ref 31.5–36.5)
MCV RBC AUTO: 92 FL (ref 78–100)
P AXIS - MUSE: 38 DEGREES
PLATELET # BLD AUTO: 229 10E3/UL (ref 150–450)
POTASSIUM SERPL-SCNC: 4.5 MMOL/L (ref 3.4–5.3)
PR INTERVAL - MUSE: 164 MS
QRS DURATION - MUSE: 74 MS
QT - MUSE: 406 MS
QTC - MUSE: 406 MS
R AXIS - MUSE: -25 DEGREES
RBC # BLD AUTO: 4.07 10E6/UL (ref 4.4–5.9)
SODIUM SERPL-SCNC: 137 MMOL/L (ref 135–145)
SYSTOLIC BLOOD PRESSURE - MUSE: NORMAL MMHG
T AXIS - MUSE: 26 DEGREES
VENTRICULAR RATE- MUSE: 60 BPM
WBC # BLD AUTO: 13.3 10E3/UL (ref 4–11)

## 2023-10-06 PROCEDURE — 99233 SBSQ HOSP IP/OBS HIGH 50: CPT | Performed by: INTERNAL MEDICINE

## 2023-10-06 PROCEDURE — 88304 TISSUE EXAM BY PATHOLOGIST: CPT | Mod: TC | Performed by: SURGERY

## 2023-10-06 PROCEDURE — 250N000009 HC RX 250: Performed by: ANESTHESIOLOGY

## 2023-10-06 PROCEDURE — 250N000011 HC RX IP 250 OP 636: Performed by: INTERNAL MEDICINE

## 2023-10-06 PROCEDURE — 258N000003 HC RX IP 258 OP 636: Performed by: INTERNAL MEDICINE

## 2023-10-06 PROCEDURE — 370N000017 HC ANESTHESIA TECHNICAL FEE, PER MIN: Performed by: SURGERY

## 2023-10-06 PROCEDURE — 272N000001 HC OR GENERAL SUPPLY STERILE: Performed by: SURGERY

## 2023-10-06 PROCEDURE — 250N000013 HC RX MED GY IP 250 OP 250 PS 637: Performed by: SURGERY

## 2023-10-06 PROCEDURE — 999N000141 HC STATISTIC PRE-PROCEDURE NURSING ASSESSMENT: Performed by: SURGERY

## 2023-10-06 PROCEDURE — 0DTJ4ZZ RESECTION OF APPENDIX, PERCUTANEOUS ENDOSCOPIC APPROACH: ICD-10-PCS | Performed by: SURGERY

## 2023-10-06 PROCEDURE — 120N000001 HC R&B MED SURG/OB

## 2023-10-06 PROCEDURE — 250N000011 HC RX IP 250 OP 636: Performed by: STUDENT IN AN ORGANIZED HEALTH CARE EDUCATION/TRAINING PROGRAM

## 2023-10-06 PROCEDURE — 250N000013 HC RX MED GY IP 250 OP 250 PS 637: Performed by: INTERNAL MEDICINE

## 2023-10-06 PROCEDURE — 258N000001 HC RX 258: Performed by: SURGERY

## 2023-10-06 PROCEDURE — 44970 LAPAROSCOPY APPENDECTOMY: CPT | Performed by: SURGERY

## 2023-10-06 PROCEDURE — 36415 COLL VENOUS BLD VENIPUNCTURE: CPT | Performed by: INTERNAL MEDICINE

## 2023-10-06 PROCEDURE — 80048 BASIC METABOLIC PNL TOTAL CA: CPT | Performed by: INTERNAL MEDICINE

## 2023-10-06 PROCEDURE — 250N000011 HC RX IP 250 OP 636: Performed by: ANESTHESIOLOGY

## 2023-10-06 PROCEDURE — 250N000011 HC RX IP 250 OP 636: Mod: JZ | Performed by: SURGERY

## 2023-10-06 PROCEDURE — 250N000011 HC RX IP 250 OP 636: Performed by: SURGERY

## 2023-10-06 PROCEDURE — 250N000025 HC SEVOFLURANE, PER MIN: Performed by: SURGERY

## 2023-10-06 PROCEDURE — 258N000003 HC RX IP 258 OP 636: Performed by: ANESTHESIOLOGY

## 2023-10-06 PROCEDURE — C9113 INJ PANTOPRAZOLE SODIUM, VIA: HCPCS | Mod: JZ | Performed by: INTERNAL MEDICINE

## 2023-10-06 PROCEDURE — 88304 TISSUE EXAM BY PATHOLOGIST: CPT | Mod: 26 | Performed by: PATHOLOGY

## 2023-10-06 PROCEDURE — 710N000009 HC RECOVERY PHASE 1, LEVEL 1, PER MIN: Performed by: SURGERY

## 2023-10-06 PROCEDURE — 85027 COMPLETE CBC AUTOMATED: CPT | Performed by: INTERNAL MEDICINE

## 2023-10-06 PROCEDURE — 250N000013 HC RX MED GY IP 250 OP 250 PS 637: Performed by: ANESTHESIOLOGY

## 2023-10-06 PROCEDURE — 360N000076 HC SURGERY LEVEL 3, PER MIN: Performed by: SURGERY

## 2023-10-06 RX ORDER — ONDANSETRON 4 MG/1
4 TABLET, ORALLY DISINTEGRATING ORAL EVERY 30 MIN PRN
Status: CANCELLED | OUTPATIENT
Start: 2023-10-06

## 2023-10-06 RX ORDER — HYDROMORPHONE HYDROCHLORIDE 1 MG/ML
0.4 INJECTION, SOLUTION INTRAMUSCULAR; INTRAVENOUS; SUBCUTANEOUS EVERY 5 MIN PRN
Status: DISCONTINUED | OUTPATIENT
Start: 2023-10-06 | End: 2023-10-06 | Stop reason: HOSPADM

## 2023-10-06 RX ORDER — AMOXICILLIN 250 MG
1 CAPSULE ORAL 2 TIMES DAILY
Status: DISCONTINUED | OUTPATIENT
Start: 2023-10-06 | End: 2023-10-07 | Stop reason: HOSPADM

## 2023-10-06 RX ORDER — ONDANSETRON 2 MG/ML
4 INJECTION INTRAMUSCULAR; INTRAVENOUS EVERY 30 MIN PRN
Status: DISCONTINUED | OUTPATIENT
Start: 2023-10-06 | End: 2023-10-06 | Stop reason: HOSPADM

## 2023-10-06 RX ORDER — HYDROCODONE BITARTRATE AND ACETAMINOPHEN 5; 325 MG/1; MG/1
1 TABLET ORAL EVERY 4 HOURS PRN
Qty: 10 TABLET | Refills: 0 | Status: SHIPPED | OUTPATIENT
Start: 2023-10-06 | End: 2023-10-07

## 2023-10-06 RX ORDER — ACETAMINOPHEN 325 MG/1
650 TABLET ORAL EVERY 4 HOURS PRN
Status: DISCONTINUED | OUTPATIENT
Start: 2023-10-09 | End: 2023-10-07 | Stop reason: HOSPADM

## 2023-10-06 RX ORDER — ACETAMINOPHEN 325 MG/1
975 TABLET ORAL ONCE
Status: COMPLETED | OUTPATIENT
Start: 2023-10-06 | End: 2023-10-06

## 2023-10-06 RX ORDER — SODIUM CHLORIDE 9 MG/ML
INJECTION, SOLUTION INTRAVENOUS CONTINUOUS
Status: DISCONTINUED | OUTPATIENT
Start: 2023-10-06 | End: 2023-10-06

## 2023-10-06 RX ORDER — LIDOCAINE 40 MG/G
CREAM TOPICAL
Status: DISCONTINUED | OUTPATIENT
Start: 2023-10-06 | End: 2023-10-06 | Stop reason: HOSPADM

## 2023-10-06 RX ORDER — CARVEDILOL 12.5 MG/1
25 TABLET ORAL 2 TIMES DAILY WITH MEALS
Status: DISCONTINUED | OUTPATIENT
Start: 2023-10-06 | End: 2023-10-07 | Stop reason: HOSPADM

## 2023-10-06 RX ORDER — ONDANSETRON 4 MG/1
4 TABLET, ORALLY DISINTEGRATING ORAL EVERY 30 MIN PRN
Status: DISCONTINUED | OUTPATIENT
Start: 2023-10-06 | End: 2023-10-06 | Stop reason: HOSPADM

## 2023-10-06 RX ORDER — CEFAZOLIN SODIUM/WATER 2 G/20 ML
2 SYRINGE (ML) INTRAVENOUS
Status: DISCONTINUED | OUTPATIENT
Start: 2023-10-06 | End: 2023-10-06 | Stop reason: HOSPADM

## 2023-10-06 RX ORDER — ACETAMINOPHEN 325 MG/1
975 TABLET ORAL EVERY 8 HOURS
Status: DISCONTINUED | OUTPATIENT
Start: 2023-10-06 | End: 2023-10-07 | Stop reason: HOSPADM

## 2023-10-06 RX ORDER — HYDRALAZINE HYDROCHLORIDE 20 MG/ML
10 INJECTION INTRAMUSCULAR; INTRAVENOUS EVERY 6 HOURS PRN
Status: DISCONTINUED | OUTPATIENT
Start: 2023-10-06 | End: 2023-10-07 | Stop reason: HOSPADM

## 2023-10-06 RX ORDER — ONDANSETRON 2 MG/ML
INJECTION INTRAMUSCULAR; INTRAVENOUS PRN
Status: DISCONTINUED | OUTPATIENT
Start: 2023-10-06 | End: 2023-10-06

## 2023-10-06 RX ORDER — LIDOCAINE 40 MG/G
CREAM TOPICAL
Status: DISCONTINUED | OUTPATIENT
Start: 2023-10-06 | End: 2023-10-07 | Stop reason: HOSPADM

## 2023-10-06 RX ORDER — HYDROMORPHONE HYDROCHLORIDE 1 MG/ML
0.2 INJECTION, SOLUTION INTRAMUSCULAR; INTRAVENOUS; SUBCUTANEOUS EVERY 5 MIN PRN
Status: DISCONTINUED | OUTPATIENT
Start: 2023-10-06 | End: 2023-10-06 | Stop reason: HOSPADM

## 2023-10-06 RX ORDER — ONDANSETRON 2 MG/ML
4 INJECTION INTRAMUSCULAR; INTRAVENOUS EVERY 6 HOURS PRN
Status: DISCONTINUED | OUTPATIENT
Start: 2023-10-06 | End: 2023-10-06

## 2023-10-06 RX ORDER — FENTANYL CITRATE 50 UG/ML
25 INJECTION, SOLUTION INTRAMUSCULAR; INTRAVENOUS EVERY 5 MIN PRN
Status: DISCONTINUED | OUTPATIENT
Start: 2023-10-06 | End: 2023-10-06 | Stop reason: HOSPADM

## 2023-10-06 RX ORDER — BISACODYL 10 MG
10 SUPPOSITORY, RECTAL RECTAL DAILY PRN
Status: DISCONTINUED | OUTPATIENT
Start: 2023-10-06 | End: 2023-10-07 | Stop reason: HOSPADM

## 2023-10-06 RX ORDER — CARVEDILOL 12.5 MG/1
12.5 TABLET ORAL 2 TIMES DAILY WITH MEALS
Status: DISCONTINUED | OUTPATIENT
Start: 2023-10-06 | End: 2023-10-06

## 2023-10-06 RX ORDER — OXYCODONE HYDROCHLORIDE 5 MG/1
10 TABLET ORAL EVERY 4 HOURS PRN
Status: DISCONTINUED | OUTPATIENT
Start: 2023-10-06 | End: 2023-10-07 | Stop reason: HOSPADM

## 2023-10-06 RX ORDER — ONDANSETRON 4 MG/1
4 TABLET, ORALLY DISINTEGRATING ORAL EVERY 6 HOURS PRN
Status: DISCONTINUED | OUTPATIENT
Start: 2023-10-06 | End: 2023-10-06

## 2023-10-06 RX ORDER — PROPOFOL 10 MG/ML
INJECTION, EMULSION INTRAVENOUS PRN
Status: DISCONTINUED | OUTPATIENT
Start: 2023-10-06 | End: 2023-10-06

## 2023-10-06 RX ORDER — HYDROMORPHONE HCL IN WATER/PF 6 MG/30 ML
0.4 PATIENT CONTROLLED ANALGESIA SYRINGE INTRAVENOUS
Status: DISCONTINUED | OUTPATIENT
Start: 2023-10-06 | End: 2023-10-07

## 2023-10-06 RX ORDER — SODIUM CHLORIDE, SODIUM LACTATE, POTASSIUM CHLORIDE, CALCIUM CHLORIDE 600; 310; 30; 20 MG/100ML; MG/100ML; MG/100ML; MG/100ML
INJECTION, SOLUTION INTRAVENOUS CONTINUOUS
Status: DISCONTINUED | OUTPATIENT
Start: 2023-10-06 | End: 2023-10-06 | Stop reason: HOSPADM

## 2023-10-06 RX ORDER — FENTANYL CITRATE 50 UG/ML
50 INJECTION, SOLUTION INTRAMUSCULAR; INTRAVENOUS EVERY 5 MIN PRN
Status: DISCONTINUED | OUTPATIENT
Start: 2023-10-06 | End: 2023-10-06 | Stop reason: HOSPADM

## 2023-10-06 RX ORDER — BUPIVACAINE HYDROCHLORIDE 2.5 MG/ML
INJECTION, SOLUTION EPIDURAL; INFILTRATION; INTRACAUDAL PRN
Status: DISCONTINUED | OUTPATIENT
Start: 2023-10-06 | End: 2023-10-06 | Stop reason: HOSPADM

## 2023-10-06 RX ORDER — HYDROMORPHONE HYDROCHLORIDE 1 MG/ML
0.3 INJECTION, SOLUTION INTRAMUSCULAR; INTRAVENOUS; SUBCUTANEOUS EVERY 4 HOURS PRN
Status: DISCONTINUED | OUTPATIENT
Start: 2023-10-06 | End: 2023-10-07

## 2023-10-06 RX ORDER — FENTANYL CITRATE 50 UG/ML
INJECTION, SOLUTION INTRAMUSCULAR; INTRAVENOUS PRN
Status: DISCONTINUED | OUTPATIENT
Start: 2023-10-06 | End: 2023-10-06

## 2023-10-06 RX ORDER — PROCHLORPERAZINE MALEATE 10 MG
10 TABLET ORAL EVERY 6 HOURS PRN
Status: DISCONTINUED | OUTPATIENT
Start: 2023-10-06 | End: 2023-10-07 | Stop reason: HOSPADM

## 2023-10-06 RX ORDER — ENOXAPARIN SODIUM 100 MG/ML
40 INJECTION SUBCUTANEOUS EVERY 24 HOURS
Status: DISCONTINUED | OUTPATIENT
Start: 2023-10-07 | End: 2023-10-07 | Stop reason: HOSPADM

## 2023-10-06 RX ORDER — FENTANYL CITRATE 50 UG/ML
25 INJECTION, SOLUTION INTRAMUSCULAR; INTRAVENOUS
Status: DISPENSED | OUTPATIENT
Start: 2023-10-06 | End: 2023-10-06

## 2023-10-06 RX ORDER — POLYETHYLENE GLYCOL 3350 17 G/17G
17 POWDER, FOR SOLUTION ORAL DAILY
Status: DISCONTINUED | OUTPATIENT
Start: 2023-10-07 | End: 2023-10-07 | Stop reason: HOSPADM

## 2023-10-06 RX ORDER — ONDANSETRON 2 MG/ML
4 INJECTION INTRAMUSCULAR; INTRAVENOUS EVERY 30 MIN PRN
Status: CANCELLED | OUTPATIENT
Start: 2023-10-06

## 2023-10-06 RX ORDER — CEFAZOLIN SODIUM/WATER 2 G/20 ML
2 SYRINGE (ML) INTRAVENOUS SEE ADMIN INSTRUCTIONS
Status: DISCONTINUED | OUTPATIENT
Start: 2023-10-06 | End: 2023-10-06 | Stop reason: HOSPADM

## 2023-10-06 RX ORDER — OXYCODONE HYDROCHLORIDE 5 MG/1
10 TABLET ORAL
Status: DISCONTINUED | OUTPATIENT
Start: 2023-10-06 | End: 2023-10-06 | Stop reason: HOSPADM

## 2023-10-06 RX ORDER — OXYCODONE HYDROCHLORIDE 5 MG/1
5 TABLET ORAL EVERY 4 HOURS PRN
Status: DISCONTINUED | OUTPATIENT
Start: 2023-10-06 | End: 2023-10-07 | Stop reason: HOSPADM

## 2023-10-06 RX ORDER — OXYCODONE HYDROCHLORIDE 5 MG/1
5 TABLET ORAL
Status: DISCONTINUED | OUTPATIENT
Start: 2023-10-06 | End: 2023-10-06 | Stop reason: HOSPADM

## 2023-10-06 RX ORDER — HYDROMORPHONE HCL IN WATER/PF 6 MG/30 ML
0.2 PATIENT CONTROLLED ANALGESIA SYRINGE INTRAVENOUS
Status: DISCONTINUED | OUTPATIENT
Start: 2023-10-06 | End: 2023-10-07

## 2023-10-06 RX ADMIN — MAGNESIUM 64 MG (MAGNESIUM CHLORIDE) TABLET,DELAYED RELEASE 535 MG: at 10:45

## 2023-10-06 RX ADMIN — MORPHINE SULFATE 1 MG: 2 INJECTION, SOLUTION INTRAMUSCULAR; INTRAVENOUS at 06:16

## 2023-10-06 RX ADMIN — HYDROMORPHONE HYDROCHLORIDE 0.4 MG: 0.2 INJECTION, SOLUTION INTRAMUSCULAR; INTRAVENOUS; SUBCUTANEOUS at 18:42

## 2023-10-06 RX ADMIN — MORPHINE SULFATE 1 MG: 2 INJECTION, SOLUTION INTRAMUSCULAR; INTRAVENOUS at 04:10

## 2023-10-06 RX ADMIN — HYDROMORPHONE HYDROCHLORIDE 0.5 MG: 1 INJECTION, SOLUTION INTRAMUSCULAR; INTRAVENOUS; SUBCUTANEOUS at 16:02

## 2023-10-06 RX ADMIN — PROCHLORPERAZINE EDISYLATE 10 MG: 5 INJECTION INTRAMUSCULAR; INTRAVENOUS at 19:21

## 2023-10-06 RX ADMIN — FENTANYL CITRATE 100 MCG: 50 INJECTION INTRAMUSCULAR; INTRAVENOUS at 15:32

## 2023-10-06 RX ADMIN — ONDANSETRON 4 MG: 2 INJECTION INTRAMUSCULAR; INTRAVENOUS at 16:10

## 2023-10-06 RX ADMIN — PANTOPRAZOLE SODIUM 40 MG: 40 INJECTION, POWDER, FOR SOLUTION INTRAVENOUS at 10:43

## 2023-10-06 RX ADMIN — SODIUM CHLORIDE: 9 INJECTION, SOLUTION INTRAVENOUS at 10:41

## 2023-10-06 RX ADMIN — HYDROMORPHONE HYDROCHLORIDE 0.3 MG: 1 INJECTION, SOLUTION INTRAMUSCULAR; INTRAVENOUS; SUBCUTANEOUS at 10:35

## 2023-10-06 RX ADMIN — ROCURONIUM BROMIDE 20 MG: 50 INJECTION, SOLUTION INTRAVENOUS at 16:00

## 2023-10-06 RX ADMIN — ONDANSETRON 4 MG: 2 INJECTION INTRAMUSCULAR; INTRAVENOUS at 17:34

## 2023-10-06 RX ADMIN — ACETAMINOPHEN 975 MG: 325 TABLET ORAL at 13:09

## 2023-10-06 RX ADMIN — GABAPENTIN 700 MG: 300 CAPSULE ORAL at 21:28

## 2023-10-06 RX ADMIN — MIDAZOLAM 2 MG: 1 INJECTION INTRAMUSCULAR; INTRAVENOUS at 15:26

## 2023-10-06 RX ADMIN — SODIUM CHLORIDE: 9 INJECTION, SOLUTION INTRAVENOUS at 20:01

## 2023-10-06 RX ADMIN — MORPHINE SULFATE 1 MG: 2 INJECTION, SOLUTION INTRAMUSCULAR; INTRAVENOUS at 00:52

## 2023-10-06 RX ADMIN — FENTANYL CITRATE 25 MCG: 50 INJECTION, SOLUTION INTRAMUSCULAR; INTRAVENOUS at 14:02

## 2023-10-06 RX ADMIN — PIPERACILLIN AND TAZOBACTAM 3.38 G: 3; .375 INJECTION, POWDER, LYOPHILIZED, FOR SOLUTION INTRAVENOUS at 04:10

## 2023-10-06 RX ADMIN — Medication 2 G: at 15:41

## 2023-10-06 RX ADMIN — CARVEDILOL 12.5 MG: 12.5 TABLET, FILM COATED ORAL at 10:45

## 2023-10-06 RX ADMIN — MAGNESIUM 64 MG (MAGNESIUM CHLORIDE) TABLET,DELAYED RELEASE 535 MG: at 21:28

## 2023-10-06 RX ADMIN — PIPERACILLIN AND TAZOBACTAM 3.38 G: 3; .375 INJECTION, POWDER, LYOPHILIZED, FOR SOLUTION INTRAVENOUS at 12:48

## 2023-10-06 RX ADMIN — CARVEDILOL 25 MG: 12.5 TABLET, FILM COATED ORAL at 18:36

## 2023-10-06 RX ADMIN — SODIUM CHLORIDE, POTASSIUM CHLORIDE, SODIUM LACTATE AND CALCIUM CHLORIDE: 600; 310; 30; 20 INJECTION, SOLUTION INTRAVENOUS at 15:27

## 2023-10-06 RX ADMIN — MORPHINE SULFATE 1 MG: 2 INJECTION, SOLUTION INTRAMUSCULAR; INTRAVENOUS at 08:20

## 2023-10-06 RX ADMIN — PIPERACILLIN AND TAZOBACTAM 3.38 G: 3; .375 INJECTION, POWDER, LYOPHILIZED, FOR SOLUTION INTRAVENOUS at 20:21

## 2023-10-06 RX ADMIN — PROPOFOL 60 MG: 10 INJECTION, EMULSION INTRAVENOUS at 15:54

## 2023-10-06 RX ADMIN — PROPOFOL 140 MG: 10 INJECTION, EMULSION INTRAVENOUS at 15:32

## 2023-10-06 RX ADMIN — BUPROPION HYDROCHLORIDE 150 MG: 150 TABLET, FILM COATED, EXTENDED RELEASE ORAL at 10:45

## 2023-10-06 RX ADMIN — SUGAMMADEX 300 MG: 100 INJECTION, SOLUTION INTRAVENOUS at 16:17

## 2023-10-06 RX ADMIN — ACETAMINOPHEN 975 MG: 325 TABLET ORAL at 19:59

## 2023-10-06 RX ADMIN — ATORVASTATIN CALCIUM 40 MG: 40 TABLET, FILM COATED ORAL at 21:29

## 2023-10-06 RX ADMIN — SENNOSIDES AND DOCUSATE SODIUM 1 TABLET: 50; 8.6 TABLET ORAL at 21:29

## 2023-10-06 ASSESSMENT — ACTIVITIES OF DAILY LIVING (ADL)
ADLS_ACUITY_SCORE: 35
DEPENDENT_IADLS:: INDEPENDENT
ADLS_ACUITY_SCORE: 35

## 2023-10-06 NOTE — ANESTHESIA PREPROCEDURE EVALUATION
"Anesthesia Pre-Procedure Evaluation    Patient: Kiko Tovar   MRN: [id number] : 1960        Procedure : Procedure(s):  APPENDECTOMY, LAPAROSCOPIC          Past Medical History:   Diagnosis Date    ASHLEY positive 2014    Ascending aortic aneurysm (H24) 2016    3.8 cm by CTA 2019    Asymmetric septal hypertrophy (H) 2017    Mild by MRI; no further workup needed per Dr. Gardner.\"The findings indicates a focal hypertrophic cardiomyopathy in basal anteroseptal wall (1.9 cm).\"    Coronary atherosclerosis due to calcified coronary lesion 2016    Dyslipidemia, goal LDL below 100 2021    Essential hypertension 2017    GE reflux     Lyme disease     he had an iv port to treat Lyme disease wth chronic antibiotics for six months by Dr. Liilan Alexander at [facility]      Past Surgical History:   Procedure Laterality Date    IR MISCELLANEOUS PROCEDURE  2010    IR PORT PLACEMENT >5 YEARS  2010    IR PORT REMOVAL        Allergies   Allergen Reactions    Amlodipine      Edema in feet    Gluten Meal     Isordil [Isosorbide Nitrate] Headache    Losartan      Other Reaction(s): Hypertension      Social History     Tobacco Use    Smoking status: Never    Smokeless tobacco: Never   Substance Use Topics    Alcohol use: Yes     Comment: Alcoholic Drinks/day: rarely      Wt Readings from Last 1 Encounters:   10/05/23 67.6 kg (149 lb 0.5 oz)        Anesthesia Evaluation            ROS/MED HX  ENT/Pulmonary:  - neg pulmonary ROS     Neurologic:  - neg neurologic ROS     Cardiovascular:     (+) Dyslipidemia hypertension- -  CAD -  - -      CHF etiology: HCM                          Previous cardiac testing   Echo: Date: 2023 Results:  Final Impressions   1. Echocardiogram performed per Hypertrophic Cardiomyopathy three month mavacamten protocol.   2. Normal left ventricular chamber size, no regional wall motion abnormalities, calculated 3-D volumetric " ejection fraction 60%.   3. No dynamic left ventricular outflow tract obstruction at rest, with Valsalva maneuver, or hqtci-vm-[id number] maneuver.   4. Normal right ventricular chamber size, normal systolic function, estimated right ventricular systolic pressure 32 mmHg (right atrial pressure of 5 mmHg).   5. No  significant valvular heart disease.   6. Mildly enlarged sinus of Valsalva diameter of 42 mm, upper limit of normal for age, sex and BSA is 41 mm.   7. Compared to the report of 06/30/2023 no significant change has occurred.     Stress Test:  Date: Results:    ECG Reviewed:  Date: Results:    Cath:  Date: Results:      METS/Exercise Tolerance:     Hematologic:  - neg hematologic  ROS     Musculoskeletal:       GI/Hepatic:     (+) GERD,        appendicitis,           Renal/Genitourinary:  - neg Renal ROS     Endo:  - neg endo ROS     Psychiatric/Substance Use:       Infectious Disease:       Malignancy:       Other:            Physical Exam    Airway        Mallampati: I   TM distance: > 3 FB   Neck ROM: full   Mouth opening: > 3 cm    Respiratory Devices and Support         Dental       (+) Minor Abnormalities - some fillings, tiny chips      Cardiovascular          Rhythm and rate: regular and normal     Pulmonary           breath sounds clear to auscultation           OUTSIDE LABS:  CBC:   Lab Results   Component Value Date    WBC 13.3 (H) 10/06/2023    WBC 12.6 (H) 08/14/2023    HGB 12.2 (L) 10/06/2023    HGB 13.0 (L) 08/14/2023    HCT 37.6 (L) 10/06/2023    HCT 40.7 08/14/2023     10/06/2023     08/14/2023     BMP:   Lab Results   Component Value Date     10/06/2023     08/14/2023    POTASSIUM 4.5 10/06/2023    POTASSIUM 4.1 08/14/2023    CHLORIDE 105 10/06/2023    CHLORIDE 108 (H) 08/14/2023    CO2 22 10/06/2023    CO2 22 08/14/2023    BUN 22.5 10/06/2023    BUN 23.2 (H) 08/14/2023    CR 1.45 (H) 10/06/2023    CR 1.41 (H) 08/14/2023    GLC 85 10/06/2023     (H) 08/14/2023      COAGS: No results found for: PTT, INR, FIBR  POC: No results found for: BGM, HCG, HCGS  HEPATIC:   Lab Results   Component Value Date    ALBUMIN 4.3 07/10/2020    ALBUMIN 4.3 07/10/2020    PROTTOTAL 8.0 12/01/2019    ALT 32 12/01/2019    AST 24 12/01/2019    ALKPHOS 130 12/01/2019    BILITOTAL 0.6 12/01/2019     OTHER:   Lab Results   Component Value Date    BLAIR 8.1 (L) 10/06/2023    PHOS 3.3 07/10/2020       Anesthesia Plan    ASA Status:  2, emergent    NPO Status:  NPO Appropriate    Anesthesia Type: General.     - Airway: ETT   Induction: Propofol.           Consents    Anesthesia Plan(s) and associated risks, benefits, and realistic alternatives discussed. Questions answered and patient/representative(s) expressed understanding.     - Discussed:     - Discussed with:  Patient      - Extended Intubation/Ventilatory Support Discussed: No.      - Patient is DNR/DNI Status: No     Use of blood products discussed: No .     Postoperative Care       PONV prophylaxis: Ondansetron (or other 5HT-3), Dexamethasone or Solumedrol, Background Propofol Infusion     Comments:                Vicente Goncalves MD

## 2023-10-06 NOTE — PROGRESS NOTES
[facility]    PROGRESS NOTE - Hospitalist Service    Assessment and Plan  63 year old male with a pertinent medical history of CKD, acute kidney failure, aneurysm of ascending aorta, hypertrophic cardiomyopathy, coronary atherosclerosis, HTN, dyslipidemia, who presents to the ED for evaluation of abdominal pain.  Found to have acute appendicitis    Acute appendicitis  - Patient presents with abdominal pain with elevated white blood cells  - CT abdomen show acute appendicitis  - Surgical consult, appreciate  - Plan for surgery today   - Continue IV Zosyn and IV fluid  - Keep patient n.p.o.    History of coronary artery disease and cardiomyopathy  - Denies any chest pain or shortness of breath  - Resume home medications   - Continue to monitor on telemetry    Abdominal pain  -Secondary to acute appendicitis  -Change morphine to Dilaudid because of renal function    Chronic kidney disease  -Stage II-III  -Stable creatinine  -Continue IV hydration      50 MINUTES SPENT BY ME on the date of service doing chart review, history, exam, documentation & further activities per the note    Principal Problem:    Acute appendicitis with localized peritonitis, without perforation, abscess, or gangrene      VTE prophylaxis:  Pneumatic Compression Devices  DIET: Orders Placed This Encounter      NPO for Medical/Clinical Reasons Except for: Meds      Disposition/Barriers to discharge: Surgery, IV antibiotic  Code Status: Full Code    Subjective:  Kiko is feeling slightly better today, still has mild abdominal pain.  No nausea or vomiting.    PHYSICAL EXAM  Vitals:    10/05/23 1432 10/05/23 1536 10/05/23 2032   Weight: 67.9 kg (149 lb 9.6 oz) 68 kg (150 lb) 67.6 kg (149 lb 0.5 oz)     B/P:133/76 T:97.9 P:68 R:18     Intake/Output Summary (Last 24 hours) at 10/6/2023 1120  Last data filed at 10/6/2023 0024  Gross per 24 hour   Intake 120 ml   Output --   Net 120 ml      Body mass index is 24.8  kg/m .    Constitutional: awake, alert, cooperative, no apparent distress, and appears stated age  Respiratory: No increased work of breathing, good air exchange, clear to auscultation bilaterally, no crackles or wheezing  Cardiovascular: Normal apical impulse, regular rate and rhythm, normal S1 and S2, no S3 or S4, and no murmur noted  GI: No scars, normal bowel sounds, soft, non-distended, positive tenderness on right lower quadrant.    No masses palpated, no hepatosplenomegally  Skin: no bruising or bleeding and normal skin color, texture, turgor  Musculoskeletal: There is no redness, warmth, or swelling of the joints.  Full range of motion noted.  no lower extremity pitting edema present  Neurologic: Awake, alert, oriented to name, place and time.  Cranial nerves II-XII are grossly intact.  Motor is 5 out of 5 bilaterally.   Sensory is intact.    Neuropsychiatric: Appropriate with examiner      PERTINENT LABS/IMAGING:    I have personally reviewed the following data over the past 24 hrs:    13.3 (H)  \   12.2 (L)   / 229     137 105 22.5 /  74   4.5 22 1.45 (H) \       Imaging results reviewed over the past 24 hrs:   No results found for this or any previous visit (from the past 24 hour(s)).    Discussed with patient, family, surgery, nursing staff and discharge planner    Cole Barajas MD  [facility] Medicine Service  [phone]

## 2023-10-06 NOTE — PLAN OF CARE
"Goal Outcome Evaluation:    Pt A/O x4. Reports pain 6-8/10 in abdomen, PRN morphine given x3. Pt NPO since midnight. Tele NSR. Zosyn currently infusing. SBA to bathroom. Plan for appendectomy sometime today. VSS.    /73 (BP Location: Left arm)   Pulse 67   Temp 98  F (36.7  C) (Oral)   Resp 18   Ht 1.651 m (5' 5\")   Wt 67.6 kg (149 lb 0.5 oz)   SpO2 96%   BMI 24.80 kg/m          "

## 2023-10-06 NOTE — ANESTHESIA PROCEDURE NOTES
Airway       Patient location during procedure: OR       Procedure Start/Stop Times: 10/6/2023 3:35 PM  Staff -        Anesthesiologist:  Vicente Goncalves MD       CRNA: Karli Fontana APRN CRNA       Performed By: CRNA  Consent for Airway        Urgency: elective  Indications and Patient Condition       Indications for airway management: cheryl-procedural       Induction type:intravenous       Mask difficulty assessment: 1 - vent by mask    Final Airway Details       Final airway type: endotracheal airway       Successful airway: Oral and ETT - single  Endotracheal Airway Details        ETT size (mm): 7.5       Cuffed: yes       Successful intubation technique: direct laryngoscopy       DL Blade Type: [place] 2       Grade View of Cords: 1       Adjucts: stylet and tooth guard       Position: Right       Measured from: lips       Secured at (cm): 23       Bite block used: None    Post intubation assessment        Placement verified by: capnometry, equal breath sounds and chest rise        Number of attempts at approach: 1       Number of other approaches attempted: 0       Secured with: silk tape       Ease of procedure: easy       Dentition: Intact and Unchanged       Dental guard used and removed. Dental Guard Type: Proguard Red.    Medication(s) Administered   Medication Administration Time: 10/6/2023 3:35 PM

## 2023-10-06 NOTE — ANESTHESIA POSTPROCEDURE EVALUATION
Patient: Kiko Tovar    Procedure: Procedure(s):  APPENDECTOMY, LAPAROSCOPIC       Anesthesia Type:  General    Note:  Disposition: Inpatient   Postop Pain Control: Uneventful            Sign Out: Well controlled pain   PONV: No   Neuro/Psych: Uneventful            Sign Out: Acceptable/Baseline neuro status   Airway/Respiratory: Uneventful            Sign Out: Acceptable/Baseline resp. status   CV/Hemodynamics: Uneventful            Sign Out: Acceptable CV status; No obvious hypovolemia; No obvious fluid overload   Other NRE: NONE   DID A NON-ROUTINE EVENT OCCUR? No           Last vitals:  Vitals Value Taken Time   /81 10/06/23 1700   Temp 36.7  C (98.1  F) 10/06/23 1700   Pulse 62 10/06/23 1709   Resp 12 10/06/23 1709   SpO2 97 % 10/06/23 1709   Vitals shown include unvalidated device data.    Electronically Signed By: Ricky Lopez MD  October 6, 2023  5:10 PM

## 2023-10-06 NOTE — DISCHARGE INSTRUCTIONS
From your Surgeon:    Follow up: It is our practice to have all patients follow up with us 2 weeks after their surgery to ensure they are recovering well.  For straightforward laparoscopic procedures, this can be done either in clinic as a scheduled follow up appointment, or over the phone.  If you would like a scheduled follow up appointment in clinic, please call us at [phone] to schedule an appointment at your convenience.  If you would prefer to follow up with us by phone please let us know so that we may contact you 2-3 weeks following your procedure.  Most post op appointments are scheduled in the [facility] which is run by our Physician Assistants or Nurse Practitioner. If you are doing well no follow up needed. Our nurses will call next week to see how you are doing.   While you are in the hospital we will try to schedule you for your post op appointment prior to discharge however if this does not occur you can always call to schedule.   If you have any questions or concerns you can always call us. During office hours our nurses will triage and speak to you about concerns. If you call after hours you will reach our answering service who will page the doctor or [facility] on call.     Contact  -[facility]                   Dr. Mayco Hortno                   [address] [address]                   Phone- [phone]                   Fax- [phone]                       Diet: Regular diet. Patients can have difficulty with constipation following surgery,due in part to the administration of narcotic medications.  If you are suffering with constipation, you should avoid foods such as hard cheeses or red meat.  Things to help avoid constipation are eating foods high in fiber,drink plenty of fluids, and be active as much as you can. If needed you can take over the counter medications for constipation such  as laxatives or bulking psyllium products.     Activity: You should continue to be active at home, including ambulating frequently.  If possible try to limit the amount of time spent in bed.    Restrictions: You should not lift greater than 15 pounds for 2 weeks, and will want toavoid strenuous physical activity for 1-2 weeks.  You should limit your physical activity if it causes you discomfort; however, this should resolve within 1-2 weeks.   Walking does not count as strenuous physical activity.You are safe to walk up and down stairs.  Following 2 weeks you may resume all normal physical activity.    Wound / drain care: You have glue over your wounds that have been sutured. The glue will fall off over next 1-2 weeks.       It is normal to have a small rim of red present around the incisions. This should not, however,extend beyond 1/4 inch from the incision.  If your incisions become increasingly tender, red, or draining, please contact us.       Bathing: You may shower after 24 hours from surgery.  It is ok to get your incisionswet, but avoid rubbing them.  Avoid soaking in bath tubs, or swimming in lakes, pools, or streams for 2 weeks following surgery.

## 2023-10-06 NOTE — OP NOTE
General Surgery Operative Note    Date of Surgery: 10/6/2023     Surgeon: Mayco Horton MD    Assistant: Gabriel Colin PA-C her assistance was required for visualization during the case    Preoperative Diagnosis: Acute appendicitis    Postoperative Diagnosis: Acute appendicitis    Procedure: Laparoscopic appendectomy    EBL: 15 cc    Findings: Severely inflamed tip appendicitis with no clear perforation    Indications:  Patient is a 63-year-old man presented emergency department several days of abdominal pain.  His work-up was consistent with acute appendicitis.  After discussion of the risks and benefits of laparoscopic appendectomy, the patient consented to the procedure.    Details of operation:  Patient was brought to the operating room placed on the table in supine position.  General anesthesia was induced without incident.  The patient was prepped and draped in usual sterile fashion.  A timeout for safety was performed.    I began with a left upper quadrant Veress needle entry the abdomen was insufflated without incident.  A 5 mm optical port is placed the site.  There is no injury with entry.  I placed an additional 12 mm port in the left lower quadrant and another 5 mm port in the suprapubic area.  The patient's cecum was very redundant and up in the right upper quadrant as seen on the CT scan.  The appendix was actually right in the mid abdomen and was plastered down to the underlying retroperitoneum.  The tip of the appendix was very inflamed and quite firm.  I used mainly blunt dissection to peel it off of the underlying tissue.  I also pulled ligament of Treves off bluntly.  I then made a window between the base of the appendix and the mesoappendix.  The mesoappendix was divided with the LigaSure device.  The appendix base was stapled with a 45 mm blue load Endo MINH stapler.  There was a small nodule at the base and so consequently I scooted down towards the cecum more to include this nodule in the  specimen.  There was still plenty of room for the terminal ileum.  I did require second firing of the stapler to complete this.  The appendix was placed in Endo Catch bag and removed from the abdomen.  Hemostasis was assured.  12 mm port site fascia was closed with an 0 Vicryl on a suture passer device.  Pneumoperitoneum was released and the ports were removed.  Skin was closed with 4-0 Monocryl and Dermabond was used as a dressing.  The patient tolerated procedure well.    Mayco Horton MD  General Surgeon  [facility]  Surgery [address] [address]?  Office: [phone]

## 2023-10-06 NOTE — CONSULTS
Care Management Initial Consult    General Information  Assessment completed with: Patient,    Type of CM/SW Visit: Initial Assessment    Primary Care Provider verified and updated as needed: Yes   Readmission within the last 30 days: no previous admission in last 30 days      Reason for Consult: discharge planning  Advance Care Planning:            Communication Assessment  Patient's communication style: spoken language (English or Bilingual)             Cognitive  Cognitive/Neuro/Behavioral: WDL                      Living Environment:   People in home: significant other     Current living Arrangements: house      Able to return to prior arrangements: yes       Family/Social Support:  Care provided by: self  Provides care for: no one  Marital Status: Lives with Significant Other  Significant Other          Description of Support System: Supportive, Involved         Current Resources:   Patient receiving home care services: No     Community Resources: None  Equipment currently used at home:    Supplies currently used at home: None    Employment/Financial:  Employment Status: self-employed        Financial Concerns: No concerns identified   Referral to Financial Worker: No       Does the patient's insurance plan have a 3 day qualifying hospital stay waiver?  No    Lifestyle & Psychosocial Needs:  Social Determinants of Health     Food Insecurity: Not on file   Depression: Not at risk (12/7/2021)    PHQ-2     PHQ-2 Score: 0   Housing Stability: Not on file   Tobacco Use: Low Risk  (10/5/2023)    Patient History     Smoking Tobacco Use: Never     Smokeless Tobacco Use: Never     Passive Exposure: Not on file   Financial Resource Strain: Not on file   Alcohol Use: Not on file   Transportation Needs: Not on file   Physical Activity: Not on file   Interpersonal Safety: Not on file   Stress: Not on file   Social Connections: Not on file       Functional Status:  Prior to admission patient needed assistance:   Dependent  ADLs:: Independent  Dependent IADLs:: Independent       Mental Health Status:  Mental Health Status: No Current Concerns       Chemical Dependency Status:  Chemical Dependency Status: No Current Concerns             Values/Beliefs:  Spiritual, Cultural Beliefs, Judaism Practices, Values that affect care: no               Additional Information:  CM met with patient in room to discuss discharge planning and complete initial assessment.     Patient lives in a house with s/o, Fani. Patient is independent with ADL/IADLs at baseline. Patient reports no DME or home care services. Patient is self-employed and drives. Anticipate discharge home, s/o Fani to transport.    CM will continue to monitor progression of care, review team recommendations and provide discharge planning assist as needed.       TONI WooW

## 2023-10-06 NOTE — ED NOTES
"[facility] ED Handoff Report    ED Chief Complaint: appendecitis    ED Diagnosis:  (K35.30) Acute appendicitis with localized peritonitis, without perforation, abscess, or gangrene  Comment: NA  Plan: surgery       PMH:    Past Medical History:   Diagnosis Date    ASHLEY positive 12/29/2014    Ascending aortic aneurysm (H24) 12/06/2016    3.8 cm by CTA March 12, 2019    Asymmetric septal hypertrophy (H) 01/11/2017    Mild by MRI; no further workup needed per Dr. Gardner.\"The findings indicates a focal hypertrophic cardiomyopathy in basal anteroseptal wall (1.9 cm).\"    Coronary atherosclerosis due to calcified coronary lesion 2/11/2016    Dyslipidemia, goal LDL below 100 6/2/2021    Essential hypertension 1/11/2017    GE reflux     Lyme disease 2010    he had an iv port to treat Lyme disease wth chronic antibiotics for six months by Dr. Lilian Alexander at [facility]        Code Status:  Full Code     Falls Risk: No Band: Not applicable    Current Living Situation/Residence: lives with a significant other     Elimination Status: Continent: Yes     Activity Level: independent    Patients Preferred Language:  English     Needed: No    Vital Signs:  BP (!) 151/74   Pulse 61   Temp 97  F (36.1  C) (Oral)   Resp 29   Ht 1.651 m (5' 5\")   Wt 68 kg (150 lb)   SpO2 100%   BMI 24.96 kg/m       Cardiac Rhythm: NSR    Pain Score: 6/10    Is the Patient Confused:  No    Last Food or Drink: 10/05/23 at 1100am    Focused Assessment:  Lower quadrent pain, morphine given x1- zofran x 1 for nausea. Alert oriented and pleasant.     Tests Performed: Done: Labs    Treatments Provided:  NA    Family Dynamics/Concerns: No    Family Updated On Visitor Policy: Yes    Plan of Care Communicated to Family: Yes    Who Was Updated about Plan of Care: spouse     Belongings Checklist Done and Signed by Patient: Yes    Medications sent with patient: yes- wife has them     Covid: asymptomatic , " NA    Additional Information: LORIE    RN: Chapis Mcmanus RN   10/5/2023 7:21 PM

## 2023-10-06 NOTE — ANESTHESIA CARE TRANSFER NOTE
Patient: Kiko Tovar    Procedure: Procedure(s):  APPENDECTOMY, LAPAROSCOPIC       Diagnosis: Acute appendicitis with localized peritonitis, without perforation, abscess, or gangrene [K35.30]  Diagnosis Additional Information: No value filed.    Anesthesia Type:   General     Note:    Oropharynx: oropharynx clear of all foreign objects and spontaneously breathing  Level of Consciousness: awake  Oxygen Supplementation: room air    Independent Airway: airway patency satisfactory and stable  Dentition: dentition unchanged  Vital Signs Stable: post-procedure vital signs reviewed and stable    Patient transferred to: PACU    Handoff Report: Identifed the Patient, Identified the Reponsible Provider, Reviewed the pertinent medical history, Discussed the surgical course, Reviewed Intra-OP anesthesia mangement and issues during anesthesia, Set expectations for post-procedure period and Allowed opportunity for questions and acknowledgement of understanding    Vitals:  Vitals Value Taken Time   /82    Temp 97.4    Pulse 71 10/06/23 1641   Resp 17 10/06/23 1641   SpO2 100 % 10/06/23 1641   Vitals shown include unvalidated device data.    Electronically Signed By: NINO Doan CRNA  October 6, 2023  4:43 PM

## 2023-10-07 VITALS
TEMPERATURE: 97.4 F | HEART RATE: 52 BPM | BODY MASS INDEX: 25.64 KG/M2 | DIASTOLIC BLOOD PRESSURE: 70 MMHG | HEIGHT: 65 IN | OXYGEN SATURATION: 97 % | WEIGHT: 153.88 LBS | RESPIRATION RATE: 18 BRPM | SYSTOLIC BLOOD PRESSURE: 143 MMHG

## 2023-10-07 LAB
ALBUMIN SERPL BCG-MCNC: 3.4 G/DL (ref 3.5–5.2)
ALP SERPL-CCNC: 96 U/L (ref 40–129)
ALT SERPL W P-5'-P-CCNC: 18 U/L (ref 0–70)
ANION GAP SERPL CALCULATED.3IONS-SCNC: 9 MMOL/L (ref 7–15)
AST SERPL W P-5'-P-CCNC: 22 U/L (ref 0–45)
BILIRUB SERPL-MCNC: 0.5 MG/DL
BUN SERPL-MCNC: 20.7 MG/DL (ref 8–23)
CALCIUM SERPL-MCNC: 8.7 MG/DL (ref 8.8–10.2)
CHLORIDE SERPL-SCNC: 103 MMOL/L (ref 98–107)
CREAT SERPL-MCNC: 1.4 MG/DL (ref 0.67–1.17)
DEPRECATED HCO3 PLAS-SCNC: 22 MMOL/L (ref 22–29)
EGFRCR SERPLBLD CKD-EPI 2021: 56 ML/MIN/1.73M2
ERYTHROCYTE [DISTWIDTH] IN BLOOD BY AUTOMATED COUNT: 12.2 % (ref 10–15)
GLUCOSE SERPL-MCNC: 157 MG/DL (ref 70–99)
HCT VFR BLD AUTO: 37.3 % (ref 40–53)
HGB BLD-MCNC: 12.5 G/DL (ref 13.3–17.7)
MCH RBC QN AUTO: 30.2 PG (ref 26.5–33)
MCHC RBC AUTO-ENTMCNC: 33.5 G/DL (ref 31.5–36.5)
MCV RBC AUTO: 90 FL (ref 78–100)
PLATELET # BLD AUTO: 226 10E3/UL (ref 150–450)
POTASSIUM SERPL-SCNC: 4.5 MMOL/L (ref 3.4–5.3)
PROT SERPL-MCNC: 6.5 G/DL (ref 6.4–8.3)
RBC # BLD AUTO: 4.14 10E6/UL (ref 4.4–5.9)
SODIUM SERPL-SCNC: 134 MMOL/L (ref 135–145)
WBC # BLD AUTO: 11 10E3/UL (ref 4–11)

## 2023-10-07 PROCEDURE — 99239 HOSP IP/OBS DSCHRG MGMT >30: CPT | Performed by: INTERNAL MEDICINE

## 2023-10-07 PROCEDURE — C9113 INJ PANTOPRAZOLE SODIUM, VIA: HCPCS | Mod: JZ | Performed by: INTERNAL MEDICINE

## 2023-10-07 PROCEDURE — 80053 COMPREHEN METABOLIC PANEL: CPT | Performed by: INTERNAL MEDICINE

## 2023-10-07 PROCEDURE — 36415 COLL VENOUS BLD VENIPUNCTURE: CPT | Performed by: INTERNAL MEDICINE

## 2023-10-07 PROCEDURE — 85027 COMPLETE CBC AUTOMATED: CPT | Performed by: INTERNAL MEDICINE

## 2023-10-07 PROCEDURE — 250N000013 HC RX MED GY IP 250 OP 250 PS 637: Performed by: INTERNAL MEDICINE

## 2023-10-07 PROCEDURE — 250N000011 HC RX IP 250 OP 636: Mod: JZ | Performed by: INTERNAL MEDICINE

## 2023-10-07 PROCEDURE — 250N000013 HC RX MED GY IP 250 OP 250 PS 637: Performed by: SURGERY

## 2023-10-07 RX ORDER — PANTOPRAZOLE SODIUM 40 MG/1
40 TABLET, DELAYED RELEASE ORAL
Status: DISCONTINUED | OUTPATIENT
Start: 2023-10-07 | End: 2023-10-07 | Stop reason: HOSPADM

## 2023-10-07 RX ORDER — HYDROCODONE BITARTRATE AND ACETAMINOPHEN 5; 325 MG/1; MG/1
1 TABLET ORAL EVERY 6 HOURS PRN
Qty: 10 TABLET | Refills: 0 | Status: SHIPPED | OUTPATIENT
Start: 2023-10-07 | End: 2023-10-10

## 2023-10-07 RX ORDER — PANTOPRAZOLE SODIUM 40 MG/1
40 TABLET, DELAYED RELEASE ORAL
Status: DISCONTINUED | OUTPATIENT
Start: 2023-10-07 | End: 2023-10-07

## 2023-10-07 RX ADMIN — PIPERACILLIN AND TAZOBACTAM 3.38 G: 3; .375 INJECTION, POWDER, LYOPHILIZED, FOR SOLUTION INTRAVENOUS at 03:12

## 2023-10-07 RX ADMIN — ACETAMINOPHEN 975 MG: 325 TABLET ORAL at 03:11

## 2023-10-07 RX ADMIN — SENNOSIDES AND DOCUSATE SODIUM 1 TABLET: 50; 8.6 TABLET ORAL at 08:53

## 2023-10-07 RX ADMIN — CARVEDILOL 25 MG: 12.5 TABLET, FILM COATED ORAL at 08:53

## 2023-10-07 RX ADMIN — PANTOPRAZOLE SODIUM 40 MG: 40 TABLET, DELAYED RELEASE ORAL at 09:38

## 2023-10-07 RX ADMIN — BUPROPION HYDROCHLORIDE 150 MG: 150 TABLET, FILM COATED, EXTENDED RELEASE ORAL at 08:53

## 2023-10-07 RX ADMIN — MAGNESIUM 64 MG (MAGNESIUM CHLORIDE) TABLET,DELAYED RELEASE 535 MG: at 08:53

## 2023-10-07 RX ADMIN — ACETAMINOPHEN 975 MG: 325 TABLET ORAL at 09:38

## 2023-10-07 RX ADMIN — POLYETHYLENE GLYCOL 3350 17 G: 17 POWDER, FOR SOLUTION ORAL at 08:53

## 2023-10-07 ASSESSMENT — ACTIVITIES OF DAILY LIVING (ADL)
ADLS_ACUITY_SCORE: 35

## 2023-10-07 NOTE — DISCHARGE SUMMARY
"[facility]  Hospitalist Discharge Summary      Date of Admission:  10/5/2023  Date of Discharge:  10/7/2023  Discharging Provider: Cole Barajas MD  Discharge Service: Hospitalist Service    Discharge Diagnoses   Acute appendicitis with local peritonitis s/p lap appendectomy on 10/6/2023  Nausea and vomiting, resolved  Abdominal pain, improved  History of coronary artery disease with cardiomyopathy  Chronic kidney disease stage III      Clinically Significant Risk Factors     # Overweight: Estimated body mass index is 25.61 kg/m  as calculated from the following:    Height as of this encounter: 1.651 m (5' 5\").    Weight as of this encounter: 69.8 kg (153 lb 14.1 oz).       Follow-ups Needed After Discharge   Follow-up Appointments     Follow-up and recommended labs and tests       Follow up with primary care provider, Lissette Gonzales, within 7 days for   hospital follow- up.  No follow up labs or test are needed.    Follow up with surgery as scheduled            Unresulted Labs Ordered in the Past 30 Days of this Admission       No orders found from 9/5/2023 to 10/6/2023.        These results will be followed up by PCP    Discharge Disposition   Discharged to home  Condition at discharge: Stable    Hospital Course   63 year old male with a pertinent medical history of CKD, acute kidney failure, aneurysm of ascending aorta, hypertrophic cardiomyopathy, coronary atherosclerosis, HTN, dyslipidemia, who presents to the ED for evaluation of abdominal pain.  Found to have acute appendicitis, s/p surgery on 10/6/2023     Acute appendicitis with local peritonitis  - Patient presents with abdominal pain with elevated white blood cells  - CT abdomen show acute appendicitis  - Surgical consult, appreciate  - S/P lap appendectomy on 10/6/2023  -Patient developed nausea postprocedure, improved today  - D/Continue IV Zosyn and IV fluid as recommended by surgery  -Advance diet to regular and patient " tolerated before discharge.    History of coronary artery disease and cardiomyopathy  - Denies any chest pain or shortness of breath  - Resume home medications   - D/Continue monitor on telemetry     Abdominal pain  - Secondary to acute appendicitis  - Change morphine to Dilaudid because of renal function     Chronic kidney disease  - Stage II-III  - Stable creatinine  - D/Continue IV hydration as we advancing oral diet.    Nausea  -Post procedure  -Zofran as needed  -Improved today and patient tolerated oral diet    Leukocytosis  - Second to acute appendicitis  - Improved post procedure  - Patient started on Zosyn on admission  - No need for further antibiotics as per surgery    Discussed with patient, family, surgery, nursing staff and discharge planner.       Consultations This Hospital Stay   PHARMACY IP CONSULT  PHARMACY IP CONSULT  CARE MANAGEMENT / SOCIAL WORK IP CONSULT    Code Status   Full Code    Time Spent on this Encounter   I, Cole Barajas MD, personally saw the patient today and spent greater than 30 minutes discharging this patient.       Cole Barajas MD  [address] [address]  Phone: [phone]  Fax: [phone]  ______________________________________________________________________    Physical Exam   Vital Signs: Temp: 97.4  F (36.3  C) Temp src: Oral BP: (!) 143/70 (RN notified) Pulse: 52   Resp: 18 SpO2: 97 % O2 Device: None (Room air) Oxygen Delivery: 10 LPM  Weight: 153 lbs 14.1 oz  Constitutional: awake, alert, cooperative, no apparent distress, and appears stated age  Respiratory: No increased work of breathing, good air exchange, clear to auscultation bilaterally, no crackles or wheezing  Cardiovascular: Normal apical impulse, regular rate and rhythm, normal S1 and S2, no S3 or S4, and no murmur noted  GI: No scars, normal bowel sounds, soft, non-distended, positive tenderness on right lower quadrant.    No masses palpated,  no hepatosplenomegally  Skin: no bruising or bleeding and normal skin color, texture, turgor  Musculoskeletal: There is no redness, warmth, or swelling of the joints.  Full range of motion noted.  no lower extremity pitting edema present  Neurologic: Awake, alert, oriented to name, place and time.  Cranial nerves II-XII are grossly intact.  Motor is 5 out of 5 bilaterally.   Sensory is intact.    Neuropsychiatric: Appropriate with examiner       Primary Care Physician   Lissette Gonzales    Discharge Orders      Reason for your hospital stay    Acute appendicitis S/P lap appendectomy     Follow-up and recommended labs and tests     Follow up with primary care provider, Lissette Gonzales, within 7 days for hospital follow- up.  No follow up labs or test are needed.    Follow up with surgery as scheduled     Activity    Your activity upon discharge: activity as tolerated     Diet    Follow this diet upon discharge: Orders Placed This Encounter      Regular Diet Adult       Significant Results and Procedures   Most Recent 3 CBC's:  Recent Labs   Lab Test 10/07/23  0822 10/06/23  0709 08/14/23  1129   WBC 11.0 13.3* 12.6*   HGB 12.5* 12.2* 13.0*   MCV 90 92 94    229 213     Most Recent 3 BMP's:  Recent Labs   Lab Test 10/07/23  0822 10/06/23  1654 10/06/23  1234 10/06/23  0709 08/14/23  1129   *  --   --  137 138   POTASSIUM 4.5  --   --  4.5 4.1   CHLORIDE 103  --   --  105 108*   CO2 22  --   --  22 22   BUN 20.7  --   --  22.5 23.2*   CR 1.40*  --   --  1.45* 1.41*   ANIONGAP 9  --   --  10 8   BLAIR 8.7*  --   --  8.1* 9.0   * 97 74 85 106*   ,   Results for orders placed or performed during the hospital encounter of 08/14/23   CT Abdomen Pelvis w/o & w Contrast    Narrative    EXAM: CT ABDOMEN PELVIS W/O and W CONTRAST  LOCATION: [facility]  DATE: 8/14/2023    INDICATION: Hematuria. Dysuria.  COMPARISON: 06/05/2018 renal and bladder ultrasound  TECHNIQUE: Hematuria protocol  CT scan of the abdomen and pelvis was performed before and after injection of IV contrast. Multiplanar reformats were obtained. Dose reduction techniques were used.  CONTRAST: isovue 370 75ml     FINDINGS:   LOWER CHEST: Visualized lungs are clear. No pleural effusion. Heart size normal with no pericardial effusion.     HEPATOBILIARY: A 1 cm benign cavernous hemangioma right hepatic lobe and several small subcentimeter benign hepatic cysts require no follow-up. Liver is otherwise normal.  No calcified gallstones or bile duct dilatation.     PANCREAS: Normal.    SPLEEN: Spleen size normal.    ADRENAL GLANDS: Normal.    RIGHT KIDNEY AND URETER: Several small benign renal cysts. No follow up is needed. Kidney and ureter are otherwise normal.    LEFT KIDNEY AND URETER: Kidney and ureter are normal.    BLADDER: Mucosal hyperenhancement and mural thickening of the bladder (up to 14 mm radial thickness) with mild perivesical fat edema.    BOWEL: No bowel obstruction or inflammatory change. No free air. No free fluid.    LYMPH NODES: No lymphadenopathy.    VASCULATURE: Normal caliber abdominal aorta.      PELVIC ORGANS: Mild prostate enlargement.    MUSCULOSKELETAL: Unremarkable.      Impression    IMPRESSION:   1.   Mucosal hyperenhancement and mural thickening of the bladder with mild perivesical fat edema. Findings are compatible with acute cystitis with some degree of underlying muscular hypertrophy also possible. Consider urology consultation.  2.  Kidneys, intrarenal collecting systems, ureters and bladder otherwise unremarkable.  3.  Mild prostate enlargement.         Discharge Medications   Current Discharge Medication List        START taking these medications    Details   HYDROcodone-acetaminophen (NORCO) 5-325 MG tablet Take 1 tablet by mouth every 4 hours as needed for severe pain  Qty: 10 tablet, Refills: 0    Associated Diagnoses: Post-op pain           CONTINUE these medications which have NOT CHANGED     Details   atorvastatin (LIPITOR) 40 MG tablet Take 40 mg by mouth every evening      buPROPion (WELLBUTRIN SR) 150 MG 12 hr tablet Take 150 mg by mouth daily      carvedilol (COREG) 12.5 MG tablet Take 25 mg by mouth 2 times daily (with meals)      Daridorexant HCl (QUVIVIQ) 25 MG TABS Take 12.5 mg by mouth at bedtime      !! gabapentin (NEURONTIN) 100 MG capsule Take 100 mg by mouth every evening Take along with 2 capsules of 300mg capsules for total 700mg daily      !! gabapentin (NEURONTIN) 300 MG capsule Take 600 mg by mouth every evening Along with 100mg capsule for total 700mg daily      Magnesium Chloride POWD Take 0.5 teaspoonful by mouth 2 times daily      mavacamten (CAMZYOS) 2.5 MG CAPS Take 2.5 mg by mouth daily      senna (SENOKOT) 8.6 MG tablet Take 2 tablets by mouth daily as needed for constipation      tadalafil (CIALIS) 20 MG tablet Take 20 mg by mouth daily as needed (erectile dysfunction)      UNABLE TO FIND Take 2 capsules by mouth daily as needed (constipation) MEDICATION NAME: [place] 250 (250mg)      Vitamin D-Vitamin K (VITAMIN K2-VITAMIN D3 PO) Take 5,000 Units by mouth three times a week       !! - Potential duplicate medications found. Please discuss with provider.        Allergies   Allergies   Allergen Reactions    Amlodipine      Edema in feet    Gluten Meal     Isordil [Isosorbide Nitrate] Headache    Losartan      Other Reaction(s): Hypertension

## 2023-10-07 NOTE — PLAN OF CARE
"  Problem: Pain Acute  Goal: Optimal Pain Control and Function  10/7/2023 0406 by Allyn Sánchez, RN  Outcome: Progressing   Goal Outcome Evaluation:    POD #1 Laparoscopic Appendectomy  Minimal pain 2 out of 10  noted at the lap site- scheduled Tylenol given  Passing gas- \"minimal gas\" per patient.  SBP improved 120's.  Continue IV antiviotics  BS- 148                        "

## 2023-10-07 NOTE — PROGRESS NOTES
General Surgery Progress Note    Subjective:   Patient up walking around.  Tolerated a small dinner and a larger breakfast this morning without nausea.  Overall his pain has improved compared to before surgery.    Vitals:    10/06/23 2100 10/06/23 2300 10/07/23 0309 10/07/23 0800   BP: (!) 177/78 127/70 127/62 (!) 143/70   BP Location: Left arm Right arm Left arm Left arm   Patient Position:   Supine    Pulse:  66 65 52   Resp:  18 18 18   Temp:  97.4  F (36.3  C) 98.2  F (36.8  C) 97.4  F (36.3  C)   TempSrc:  Oral Oral Oral   SpO2:  93% 94% 97%   Weight:       Height:           10/06 0700 - 10/07 0659  In: 1040 [P.O.:440; I.V.:600]  Out: -     Physical Exam:  General: NAD, pleasant  ABD: Laparoscopic incision site x3 covered with glue with the left lower quadrant incision having a bit of ecchymosis around it.    Recent Labs   Lab 10/07/23  0822   WBC 11.0   HGB 12.5*   HCT 37.3*          Recent Labs   Lab 10/07/23  0822   *   CO2 22   BUN 20.7   ALBUMIN 3.4*   ALKPHOS 96   ALT 18   AST 22       Assessment:   63-year-old male who underwent a laparoscopic appendectomy for acute uncomplicated appendicitis yesterday    Plan:   Okay for discharge home later today  He does not need any further antibiotics  Diet as tolerated  Surgical discharge instructions placed in AVS    Genna Miles DO  General Surgeon  [facility]  Surgery [facility] - [address]  [address] [place] [id number]  Office: [phone]  Employed by - [employer]

## 2023-10-07 NOTE — PROGRESS NOTES
Care Management Discharge Note    Discharge Date: 10/07/2023       Discharge Disposition: Home    Discharge Services: None    Discharge DME: None    Discharge Transportation:  significant other Fani to transport    Private pay costs discussed: Not applicable    Does the patient's insurance plan have a 3 day qualifying hospital stay waiver?  No    PAS Confirmation Code:  (NA)  Patient/family educated on Medicare website which has current facility and service quality ratings:  (NA)    Education Provided on the Discharge Plan: Yes  Persons Notified of Discharge Plans: pt, nursing  Patient/Family in Agreement with the Plan:      Handoff Referral Completed: Yes    Additional Information:  Pt to discharge home with significant other. No CM needs identified.     LAWRENCE Hammonds

## 2023-10-07 NOTE — PLAN OF CARE
"  Problem: Plan of Care - These are the overarching goals to be used throughout the patient stay.    Goal: Absence of Hospital-Acquired Illness or Injury  Outcome: Met  Intervention: Prevent Skin Injury  Recent Flowsheet Documentation  Taken 10/7/2023 0938 by Nicole Delgadillo RN  Body Position: position changed independently  Taken 10/7/2023 0900 by Nicole Delgadillo RN  Body Position: position changed independently  Intervention: Prevent and Manage VTE (Venous Thromboembolism) Risk  Recent Flowsheet Documentation  Taken 10/7/2023 0900 by Nicole Delgadillo RN  VTE Prevention/Management: SCDs (sequential compression devices) on     Problem: Pain Acute  Goal: Optimal Pain Control and Function  Outcome: Met     Pt reported abdominal pain 2/10 managed with tylenol.     Problem: Hypertension Acute  Goal: Blood Pressure Within Desired Range  Outcome: Met    BP (!) 143/70 (BP Location: Left arm)   Pulse 52   Temp 97.4  F (36.3  C) (Oral)   Resp 18   Ht 1.651 m (5' 5\")   Wt 69.8 kg (153 lb 14.1 oz)   SpO2 97%   BMI 25.61 kg/m        Problem: Nausea and Vomiting  Goal: Nausea and Vomiting Relief  Outcome: Met    Denies nausea     Pt discharge home all paper work completed and all belongings sent with pt.   "

## 2023-10-07 NOTE — PLAN OF CARE
Problem: Pain Acute  Goal: Optimal Pain Control and Function  Outcome: Progressing     Problem: Hypertension Acute  Goal: Blood Pressure Within Desired Range  Outcome: Progressing     Problem: Nausea and Vomiting  Goal: Nausea and Vomiting Relief  Outcome: Progressing   Goal Outcome Evaluation:    Patient's incisional pain is down to 2 after receiving a dilaudid dose from previous shift.   Also nausea has resolved after the dose of compazine.   A little bit anxious at the beginning of the shift but calmed down later on.  SBP- 170's Dr. Barajas notified, ordered Hydralazine for SBP- 180's. IVF discontinued.  BS- hypoactive. Not passing gas yet

## 2023-10-09 ENCOUNTER — PATIENT OUTREACH (OUTPATIENT)
Dept: CARE COORDINATION | Facility: CLINIC | Age: 63
End: 2023-10-09
Payer: COMMERCIAL

## 2023-10-09 ENCOUNTER — NURSE TRIAGE (OUTPATIENT)
Dept: NURSING | Facility: CLINIC | Age: 63
End: 2023-10-09
Payer: COMMERCIAL

## 2023-10-09 NOTE — PROGRESS NOTES
Clinic Care Coordination Contact  Community Health Worker Initial Outreach     Called Pt, Pt reported having abdominal pain, watery bowel movement.  Requested to talk with nurse.  CHW transferred call to triage line to assist Pt.      Clinic Care Coordination Contact  [facility]: Post-Discharge Note  SITUATION                                                      Admission:    Admission Date: 10/05/23   Reason for Admission: Acute appendicitis with local peritonitis s/p lap appendectomy on 10/6/2023  Nausea and vomiting, resolved  Abdominal pain, improved  History of coronary artery disease with cardiomyopathy  Chronic kidney disease stage III  Discharge:   Discharge Date: 10/07/23  Discharge Diagnosis: Acute appendicitis with local peritonitis s/p lap appendectomy on 10/6/2023  Nausea and vomiting, resolved  Abdominal pain, improved  History of coronary artery disease with cardiomyopathy  Chronic kidney disease stage III    BACKGROUND                                                      Per hospital discharge summary and inpatient provider notes:    63 year old male with a pertinent medical history of CKD, acute kidney failure, aneurysm of ascending aorta, hypertrophic cardiomyopathy, coronary atherosclerosis, HTN, dyslipidemia, who presents to the ED for evaluation of abdominal pain.  Found to have acute appendicitis, s/p surgery on 10/6/2023       ASSESSMENT           Discharge Assessment  How are you doing now that you are home?: Pt stated that he is not feeling well  How are your symptoms? (Red Flag symptoms escalate to triage hotline per guidelines): Unchanged  Do you feel your condition is stable enough to be safe at home until your provider visit?: No (see comment)  Does the patient have their discharge instructions? : Yes  Does the patient have questions regarding their discharge instructions? : No  Were you started on any new medications or were there changes to any of your previous medications? :  Yes  Does the patient have all of their medications?: Yes  Do you have questions regarding any of your medications? : No  Do you have all of your needed medical supplies or equipment (DME)?  (i.e. oxygen tank, CPAP, cane, etc.): Yes  Discharge follow-up appointment scheduled within 14 calendar days? : Yes  Discharge Follow Up Appointment Date: 10/23/23  Discharge Follow Up Appointment Scheduled with?: Specialty Care Provider    Post-op (CHW CTA Only)  If the patient had a surgery or procedure, do they have any questions for a nurse?: Yes (see comment)         PLAN                                                      Outpatient Plan:      ollow-ups Needed After Discharge  Follow-up Appointments     Follow-up and recommended labs and tests       Follow up with primary care provider, Lissette Gonzales, within 7 days for   hospital follow- up.  No follow up labs or test are needed.    Follow up with surgery as scheduled         Future Appointments   Date Time Provider Department Center   10/23/2023  2:30 PM [facility]       For any urgent concerns, please contact our 24 hour nurse triage line: [phone]     MARIA R Hutchison  [phone]  [facility]

## 2023-10-09 NOTE — TELEPHONE ENCOUNTER
"Pt connected to FNA via Clinic Care Coordinator after follow up for post-op concerns.    S/p lap appendectomy 10/6/23, discharged 10/7  Having discomfort when trying to get up from a laying down position.  Says it's some of the same discomfort he had prior to surgery  Having watery stools- some are slightly more formed like diarrhea  Estimates about 10 per day  Gets \"flashes\" of abdominal pain that are severe (8/10), but brief and go away on their own  Pain at baseline is a 3  Hasn't taken any narcotic pain medication for the last 24 hours because he feels it was making it hard for him to sleep  Hasn't had anything else for pain  Has been eating soft foods and drinking fluids  Feels nauseous, but denies vomiting  Denies fever    Dispo: callback by PCP/surgeon today. Will route message to surgery team to follow up with patient.     Liz Stevens, RN, BSN  [facility]   Triage Nurse Advisor    Reason for Disposition   Caller has NON-URGENT question and triager unable to answer question    Additional Information   Negative: MILD TO MODERATE post-op pain (e.g., pain scale 1-7) that is not controlled with pain medications   Negative: Patient wants to be seen   Negative: SEVERE post-op pain (e.g., excruciating, pain scale 8-10) that is not controlled with pain medications   Negative: Headache and after spinal (epidural) anesthesia and not severe   Negative: Fever present > 3 days (72 hours)   Negative: Fever > 100.4 F (38.0 C)   Negative: Caller has URGENT question and triager unable to answer question   Negative: SEVERE headache and after spinal (epidural) anesthesia   Negative: Vomiting and persists > 4 hours   Negative: Vomiting and abdomen looks much more swollen than usual   Negative: Drinking very little and dehydration suspected (e.g., no urine > 12 hours, very dry mouth, very lightheaded)   Negative: Patient sounds very sick or weak to the triager   Negative: Sounds like a serious complication to the " triager   Negative: Bright red, wide-spread, sunburn-like rash   Negative: Chest pain   Negative: Difficulty breathing   Negative: Acting confused (e.g., disoriented, slurred speech) or excessively sleepy   Negative: Surgical incision symptoms and questions   Negative: Pain or burning with passing urine (urination) and male   Negative: Pain or burning with passing urine (urination) and female   Negative: Constipation   Negative: New or worsening leg (calf, thigh) pain   Negative: New or worsening leg swelling   Negative: Dizziness is severe, or persists > 24 hours after surgery   Negative: Symptoms arising from use of a urinary catheter (Ball or Coude)   Negative: Cast problems or questions   Negative: Medication question   Negative: Sounds like a life-threatening emergency to the triager    Protocols used: Post-Op Symptoms and [id number]

## 2023-10-10 NOTE — TELEPHONE ENCOUNTER
I spoke with Kiko. He is s/p laparoscopic appendectomy on 10/6/23. He states that he was feeling Ok for the first few days after surgery and was able to manage his pain. He reports having watery stools prior to his surgery but admits to using Mag citrate for constipation. He has continued to struggle with multiple, watery stools despite not using it again. He states that there has been a change to soft stools this morning. He still has abdominal pain requiring Hydrocodone at night to sleep. His other complaints of pain are with movements such as getting up and down from a chair. He denies any fever or chills. He is able to walk up to 3 miles per day without any discomfort. Discussed diet and bowel regimen with him and reviewed signs and symptoms of infection that would prompt him to call our clinic or go to the ED. He will follow up at his scheduled post-op appointment in 2 weeks.

## 2023-10-13 ENCOUNTER — TELEPHONE (OUTPATIENT)
Dept: SURGERY | Facility: CLINIC | Age: 63
End: 2023-10-13
Payer: COMMERCIAL

## 2023-10-13 NOTE — TELEPHONE ENCOUNTER
"Spoke to Kiko. He had a laparoscopic appendectomy 10/6 by Dr. Horton. He was doing fairly well until last night he says he had \"flashes of pain\" that were a 10/10. The pain would come and go but was so bad he almost went to the ER, then the pain slowly went away. Last bowel movement was yesterday. No fevers or chills. He says this pain was worse than his appendicitis. Today he has some pain in the area but is much better. He was not doing anything strenuous activities prior to the pain coming on. Incisions are all healing well. Will check with the PA's on this.  "

## 2023-10-17 ENCOUNTER — TELEPHONE (OUTPATIENT)
Dept: SURGERY | Facility: CLINIC | Age: 63
End: 2023-10-17
Payer: COMMERCIAL

## 2023-10-17 NOTE — TELEPHONE ENCOUNTER
Dr. Garcia placed future lab orders, called pt to schedule lab appt, to have labs done. Informed pt labs need to be done before appt. On the 24th October.    Jennyfer Kimble CMA  She/Her      [facility]  Surgery Clinic [facility]  [facility] Clinic [address] [address]    Office: [phone]  Fax: [phone]

## 2023-10-18 ENCOUNTER — LAB (OUTPATIENT)
Dept: LAB | Facility: CLINIC | Age: 63
End: 2023-10-18
Payer: COMMERCIAL

## 2023-10-18 DIAGNOSIS — C18.1 APPENDIX CARCINOMA (H): ICD-10-CM

## 2023-10-18 LAB
ALBUMIN SERPL BCG-MCNC: 4.2 G/DL (ref 3.5–5.2)
ALP SERPL-CCNC: 98 U/L (ref 40–129)
ALT SERPL W P-5'-P-CCNC: 51 U/L (ref 0–70)
ANION GAP SERPL CALCULATED.3IONS-SCNC: 10 MMOL/L (ref 7–15)
AST SERPL W P-5'-P-CCNC: 37 U/L (ref 0–45)
BILIRUB SERPL-MCNC: 0.5 MG/DL
BUN SERPL-MCNC: 19.4 MG/DL (ref 8–23)
CALCIUM SERPL-MCNC: 9.5 MG/DL (ref 8.8–10.2)
CANCER AG125 SERPL-ACNC: 14 U/ML
CEA SERPL-MCNC: 2.9 NG/ML
CHLORIDE SERPL-SCNC: 105 MMOL/L (ref 98–107)
CREAT SERPL-MCNC: 1.48 MG/DL (ref 0.67–1.17)
DEPRECATED HCO3 PLAS-SCNC: 24 MMOL/L (ref 22–29)
EGFRCR SERPLBLD CKD-EPI 2021: 53 ML/MIN/1.73M2
ERYTHROCYTE [DISTWIDTH] IN BLOOD BY AUTOMATED COUNT: 12.6 % (ref 10–15)
GLUCOSE SERPL-MCNC: 88 MG/DL (ref 70–99)
HCT VFR BLD AUTO: 41.1 % (ref 40–53)
HGB BLD-MCNC: 13.2 G/DL (ref 13.3–17.7)
MCH RBC QN AUTO: 30.2 PG (ref 26.5–33)
MCHC RBC AUTO-ENTMCNC: 32.1 G/DL (ref 31.5–36.5)
MCV RBC AUTO: 94 FL (ref 78–100)
PLATELET # BLD AUTO: 260 10E3/UL (ref 150–450)
POTASSIUM SERPL-SCNC: 4.5 MMOL/L (ref 3.4–5.3)
PROT SERPL-MCNC: 6.9 G/DL (ref 6.4–8.3)
RBC # BLD AUTO: 4.37 10E6/UL (ref 4.4–5.9)
SODIUM SERPL-SCNC: 139 MMOL/L (ref 135–145)
WBC # BLD AUTO: 5.7 10E3/UL (ref 4–11)

## 2023-10-18 PROCEDURE — 36415 COLL VENOUS BLD VENIPUNCTURE: CPT

## 2023-10-18 PROCEDURE — 80053 COMPREHEN METABOLIC PANEL: CPT

## 2023-10-18 PROCEDURE — 86304 IMMUNOASSAY TUMOR CA 125: CPT

## 2023-10-18 PROCEDURE — 82378 CARCINOEMBRYONIC ANTIGEN: CPT

## 2023-10-18 PROCEDURE — 99000 SPECIMEN HANDLING OFFICE-LAB: CPT

## 2023-10-18 PROCEDURE — 86301 IMMUNOASSAY TUMOR CA 19-9: CPT | Mod: 90

## 2023-10-18 PROCEDURE — 85027 COMPLETE CBC AUTOMATED: CPT

## 2023-10-19 PROBLEM — N17.9 ACUTE RENAL FAILURE (H): Chronic | Status: ACTIVE | Noted: 2020-07-02

## 2023-10-19 PROBLEM — Z87.440 HISTORY OF URINARY TRACT INFECTION: Status: ACTIVE | Noted: 2018-06-19

## 2023-10-19 PROBLEM — N18.31 STAGE 3A CHRONIC KIDNEY DISEASE (H): Status: ACTIVE | Noted: 2023-01-23

## 2023-10-20 LAB — CANCER AG19-9 SERPL IA-ACNC: 26 U/ML

## 2023-10-23 NOTE — PROGRESS NOTES
"[facility] - Surgical Oncology    NEW CONSULTATION  Oct 24, 2023    Reason for Visit:    Kiko Tovar is a 63 year old male who presents with appendiceal adenocarcinoma.  He was referred by Dr Horton.    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  Since surgery has been recovering.  Has had intermittent bloating/RLQ pain that has been improving.    Last colonoscopy: do not have report, however he thinks about 5 years ago, he says it was a poor prep    A full 14-point review of systems was performed, and the pertinent positives and negatives were mentioned in the history of present illness.    Past Medical History  Past Medical History:   Diagnosis Date    ASHLEY positive 12/29/2014    Ascending aortic aneurysm (H24) 12/06/2016    3.8 cm by CTA March 12, 2019    Asymmetric septal hypertrophy (H) 01/11/2017    Mild by MRI; no further workup needed per Dr. Gardner.\"The findings indicates a focal hypertrophic cardiomyopathy in basal anteroseptal wall (1.9 cm).\"    Coronary atherosclerosis due to calcified coronary lesion 2/11/2016    Dyslipidemia, goal LDL below 100 6/2/2021    Essential hypertension 1/11/2017    GE reflux     Lyme disease 2010    he had an iv port to treat Lyme disease wth chronic antibiotics for six months by Dr. Lilian [facility]      Notable for:  Hypertrophic cardiomyopathy- no LVOT obstruction on most recent ECHO  CKD baseline Cr 1.4  CAD     Past Surgical History  Past Surgical History:   Procedure Laterality Date    IR MISCELLANEOUS PROCEDURE  1/27/2010    IR PORT PLACEMENT >5 YEARS  2010    IR PORT REMOVAL  2011    LAPAROSCOPIC " APPENDECTOMY N/A 10/6/2023    Procedure: APPENDECTOMY, LAPAROSCOPIC;  Surgeon: Mayco Horton MD;  Location: [facility] Main OR       Social History  Social History     Socioeconomic History    Marital status:      Spouse name: Not on file    Number of children: 0    Years of education: 16    Highest education level: Not on file   Occupational History    Not on file   Tobacco Use    Smoking status: Never    Smokeless tobacco: Never   Substance and Sexual Activity    Alcohol use: Yes     Comment: Alcoholic Drinks/day: rarely    Drug use: Not on file    Sexual activity: Not on file   Other Topics Concern    Not on file   Social History Narrative    Lives alone; bachelors in [employer] from [employer]. He hikes 35 minutes four times a week in 2020.     Social Determinants of Health     Financial Resource Strain: Not on file   Food Insecurity: Not on file   Transportation Needs: Not on file   Physical Activity: Not on file   Stress: Not on file   Social Connections: Not on file   Interpersonal Safety: Not on file   Housing Stability: Not on file       Family History  Family History   Problem Relation Age of Onset    Chronic Kidney Disease Mother     Hypertension Father     Peripheral Vascular Disease Father 82.00    Dementia Father     Other - See Comments Father 82.00         one week after a fall at home;  in     No Known Problems Sister     Cancer Brother         laryngeal and esophageal cancer    No Known Problems Brother     No Known Problems Brother      Brother- esophageal cancer, agent orange expsoure  Brother- prostate cancer    Physical Exam:    /78 (BP Location: Right arm, Patient Position: Sitting, Cuff Size: Adult Small)   Wt 68 kg (150 lb)   BMI 24.96 kg/m     Physical Exam  General: Awake, alert, NAD  Head: NCAT  Pulmonary: NLB  Cardiac: RRR  Abdomen: Soft, nt/nd, incisions healing well  Extremities: wWP    Pertinent Work-Up/Findings:    Labs from 10/18 Reviewed    Path:    RESECTED VERMIFORM APPENDIX, LAPAROSCOPIC APPENDECTOMY, WITH NEOPLASTIC LESION:        -  SEE FULL SYNOPTIC REPORT BELOW        -  SUMMARY OF SYNOPSIS:              -  INVASIVE HIGH-GRADE ADENOCARCINOMA, PREDOMINANTLY MUCINOUS                    BUT WITH FOCAL SIGNET RING CELL MORPHOLOGY, ARISING WITHIN                    A LOW-GRADE APPENDICEAL MUCINOUS NEOPLASM (LAMN)              -INVASIVE CARCINOMA IS IN MID-APPENDIX              -APPENDICEAL MUCINOUS NEOPLASM (LAMN) INVOLVES                    ESSENTIALLY ALL OF APPENDIX, AND DEMONSTRATES                    FOCAL FEATURES OF SESSILE SERRATED LESION WITH DYSPLASIA              -  INVASIVE TUMOR IS PREDOMINANTLY MODERATELY DIFFERENTIATED                      (GRADE 2 OF 3); SIGNET RING CELL FOCI ARE GRADE 3              -  INVASIVE TUMOR IS APPROXIMATELY 2 CM IN GREATEST DIMENSION,                       DETERMINED FROM GLASS SLIDES              -  TUMOR INVADES THROUGH MUSCULARIS PROPRIA INTO SUBSEROSA                      AND MESOAPPENDIX, BUT DOES NOT EXTEND TO SEROSAL SURFACE                      (SEE COMMENT)              -  NEGATIVE FOR TUMOR DEPOSITS, LYMPHOVASCULAR INVASION, OR                      PERINEURAL INVASION              -  ALL MARGINS NEGATIVE FOR INVASIVE CARCINOMA; APPENDICEAL                      MUCINOUS NEOPLASM (LAMN) IS PRESENT AT PROXIMAL SURGICAL MARGIN              -  NO LYMPH NODES SUBMITTED              -  SEVERE ACUTE APPENDICITIS ALSO PRESENT              -  INVASIVE CARCINOMA IS STAGE pT3 AND pN NOT ASSIGNED    Assessment/Counseling/Plan:    Kiko Tovar is a 63 year old male with T3NxMx appendiceal adenocarcinoma.      Today we discussed the natural history and treatment of appendiceal neoplasms.  His tumor markers are all wnl.  We do not have gabriel staging.  With the rarity of this disease, I have asked for a second review of his pathology specimen.  I have also ordered a CT TAP to complete his staging.  He will also need a  colonoscopy to evaluate for any synchronous lesions, which we will arrange.  Pending the above studies, if his diagnosis remains the same and there is no evidence of extra-peritoneal disease, I would recommend moving forward with a diagnostic laparoscopy +/- right hemicolectomy depending on findings.  I have asked him to also meet with his cardiologist as well given his recent ECHO and history of HOCM, in preparation for any elective surgical intervention.  Will plan to discuss at MDT pending his staging studies.    Plan:  CT TAP  Colonoscopy  Path review  Review optimization with his cardiologist   Will call him after MDT with further plans    Estrada Garcia MD

## 2023-10-24 ENCOUNTER — PREP FOR PROCEDURE (OUTPATIENT)
Dept: SURGERY | Facility: CLINIC | Age: 63
End: 2023-10-24

## 2023-10-24 ENCOUNTER — OFFICE VISIT (OUTPATIENT)
Dept: SURGERY | Facility: CLINIC | Age: 63
End: 2023-10-24
Payer: COMMERCIAL

## 2023-10-24 ENCOUNTER — TELEPHONE (OUTPATIENT)
Dept: SURGERY | Facility: CLINIC | Age: 63
End: 2023-10-24

## 2023-10-24 VITALS — SYSTOLIC BLOOD PRESSURE: 124 MMHG | BODY MASS INDEX: 24.96 KG/M2 | DIASTOLIC BLOOD PRESSURE: 78 MMHG | WEIGHT: 150 LBS

## 2023-10-24 DIAGNOSIS — C18.1 MALIGNANT NEOPLASM OF APPENDIX (H): Primary | ICD-10-CM

## 2023-10-24 DIAGNOSIS — C18.1 ADENOCARCINOMA OF APPENDIX (H): Primary | ICD-10-CM

## 2023-10-24 PROCEDURE — 99214 OFFICE O/P EST MOD 30 MIN: CPT | Performed by: SURGERY

## 2023-10-24 RX ORDER — LIDOCAINE 40 MG/G
CREAM TOPICAL
Status: CANCELLED | OUTPATIENT
Start: 2023-10-24

## 2023-10-24 NOTE — LETTER
"    10/24/2023         RE: Kiko Tovar  [address] [facility] [address]        Dear Colleague,    Thank you for referring your patient, Kiko Tovar, to the [facility] [place]. Please see a copy of my visit note below.    [facility] - Surgical Oncology    NEW CONSULTATION  Oct 24, 2023    Reason for Visit:    Kiko Tovar is a 63 year old male who presents with appendiceal adenocarcinoma.  He was referred by Dr Horton.    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  Since surgery has been recovering.  Has had intermittent bloating/RLQ pain that has been improving.    Last colonoscopy: do not have report, however he thinks about 5 years ago, he says it was a poor prep    A full 14-point review of systems was performed, and the pertinent positives and negatives were mentioned in the history of present illness.    Past Medical History  Past Medical History:   Diagnosis Date     ASHLEY positive 12/29/2014     Ascending aortic aneurysm (H24) 12/06/2016    3.8 cm by CTA March 12, 2019     Asymmetric septal hypertrophy (H) 01/11/2017    Mild by MRI; no further workup needed per Dr. Gardner.\"The findings indicates a focal hypertrophic cardiomyopathy in basal anteroseptal wall (1.9 cm).\"     Coronary atherosclerosis due to calcified coronary lesion 2/11/2016     Dyslipidemia, goal LDL below 100 6/2/2021     Essential hypertension 1/11/2017     GE reflux      Lyme disease 2010    he had an iv port to treat Lyme disease [facility] chronic antibiotics for six months by Dr. Lilian Alexander at [facility]      Notable " for:  Hypertrophic cardiomyopathy- no LVOT obstruction on most recent ECHO  CKD baseline Cr 1.4  CAD     Past Surgical History  Past Surgical History:   Procedure Laterality Date     IR MISCELLANEOUS PROCEDURE  2010     IR PORT PLACEMENT >5 YEARS  2010     IR PORT REMOVAL       LAPAROSCOPIC APPENDECTOMY N/A 10/6/2023    Procedure: APPENDECTOMY, LAPAROSCOPIC;  Surgeon: Mayco Horton MD;  Location: [facility] Main OR       Social History  Social History     Socioeconomic History     Marital status:      Spouse name: Not on file     Number of children: 0     Years of education: 16     Highest education level: Not on file   Occupational History     Not on file   Tobacco Use     Smoking status: Never     Smokeless tobacco: Never   Substance and Sexual Activity     Alcohol use: Yes     Comment: Alcoholic Drinks/day: rarely     Drug use: Not on file     Sexual activity: Not on file   Other Topics Concern     Not on file   Social History Narrative    Lives alone; bachelors in music from [employer]. He hikes 35 minutes four times a week in 2020.     Social Determinants of Health     Financial Resource Strain: Not on file   Food Insecurity: Not on file   Transportation Needs: Not on file   Physical Activity: Not on file   Stress: Not on file   Social Connections: Not on file   Interpersonal Safety: Not on file   Housing Stability: Not on file       Family History  Family History   Problem Relation Age of Onset     Chronic Kidney Disease Mother      Hypertension Father      Peripheral Vascular Disease Father 82.00     Dementia Father      Other - See Comments Father 82.00         one week after a fall at home;  in      No Known Problems Sister      Cancer Brother         laryngeal and esophageal cancer     No Known Problems Brother      No Known Problems Brother      Brother- esophageal cancer, agent orange expsoure  Brother- prostate cancer    Physical Exam:    /78 (BP Location:  Right arm, Patient Position: Sitting, Cuff Size: Adult Small)   Wt 68 kg (150 lb)   BMI 24.96 kg/m     Physical Exam  General: Awake, alert, NAD  Head: NCAT  Pulmonary: NLB  Cardiac: RRR  Abdomen: Soft, nt/nd, incisions healing well  Extremities: wWP    Pertinent Work-Up/Findings:    Labs from 10/18 Reviewed    Path:   RESECTED VERMIFORM APPENDIX, LAPAROSCOPIC APPENDECTOMY, WITH NEOPLASTIC LESION:        -  SEE FULL SYNOPTIC REPORT BELOW        -  SUMMARY OF SYNOPSIS:              -  INVASIVE HIGH-GRADE ADENOCARCINOMA, PREDOMINANTLY MUCINOUS                    BUT WITH FOCAL SIGNET RING CELL MORPHOLOGY, ARISING WITHIN                    A LOW-GRADE APPENDICEAL MUCINOUS NEOPLASM (LAMN)              -INVASIVE CARCINOMA IS IN MID-APPENDIX              -APPENDICEAL MUCINOUS NEOPLASM (LAMN) INVOLVES                    ESSENTIALLY ALL OF APPENDIX, AND DEMONSTRATES                    FOCAL FEATURES OF SESSILE SERRATED LESION WITH DYSPLASIA              -  INVASIVE TUMOR IS PREDOMINANTLY MODERATELY DIFFERENTIATED                      (GRADE 2 OF 3); SIGNET RING CELL FOCI ARE GRADE 3              -  INVASIVE TUMOR IS APPROXIMATELY 2 CM IN GREATEST DIMENSION,                       DETERMINED FROM GLASS SLIDES              -  TUMOR INVADES THROUGH MUSCULARIS PROPRIA INTO SUBSEROSA                      AND MESOAPPENDIX, BUT DOES NOT EXTEND TO SEROSAL SURFACE                      (SEE COMMENT)              -  NEGATIVE FOR TUMOR DEPOSITS, LYMPHOVASCULAR INVASION, OR                      PERINEURAL INVASION              -  ALL MARGINS NEGATIVE FOR INVASIVE CARCINOMA; APPENDICEAL                      MUCINOUS NEOPLASM (LAMN) IS PRESENT AT PROXIMAL SURGICAL MARGIN              -  NO LYMPH NODES SUBMITTED              -  SEVERE ACUTE APPENDICITIS ALSO PRESENT              -  INVASIVE CARCINOMA IS STAGE pT3 AND pN NOT ASSIGNED    Assessment/Counseling/Plan:    Kiko HUE Tovar is a 63 year old male with T3NxMx appendiceal  adenocarcinoma.      Today we discussed the natural history and treatment of appendiceal neoplasms.  His tumor markers are all wnl.  We do not have gabriel staging.  With the rarity of this disease, I have asked for a second review of his pathology specimen.  I have also ordered a CT TAP to complete his staging.  He will also need a colonoscopy to evaluate for any synchronous lesions, which we will arrange.  Pending the above studies, if his diagnosis remains the same and there is no evidence of extra-peritoneal disease, I would recommend moving forward with a diagnostic laparoscopy +/- right hemicolectomy depending on findings.  I have asked him to also meet with his cardiologist as well given his recent ECHO and history of HOCM, in preparation for any elective surgical intervention.  Will plan to discuss at MDT pending his staging studies.    Plan:  CT TAP  Colonoscopy  Path review  Review optimization with his cardiologist   Will call him after MDT with further plans    Estrada Garcia MD       Again, thank you for allowing me to participate in the care of your patient.        Sincerely,        Estrada Garcia MD

## 2023-10-25 ENCOUNTER — HOSPITAL ENCOUNTER (OUTPATIENT)
Dept: CT IMAGING | Facility: HOSPITAL | Age: 63
Discharge: HOME | End: 2023-10-25
Attending: SURGERY | Admitting: SURGERY
Payer: COMMERCIAL

## 2023-10-25 DIAGNOSIS — C18.1 APPENDIX CARCINOMA (H): ICD-10-CM

## 2023-10-25 PROCEDURE — 74177 CT ABD & PELVIS W/CONTRAST: CPT

## 2023-10-25 PROCEDURE — 250N000011 HC RX IP 250 OP 636: Mod: JZ | Performed by: SURGERY

## 2023-10-25 RX ORDER — IOPAMIDOL 755 MG/ML
90 INJECTION, SOLUTION INTRAVASCULAR ONCE
Status: COMPLETED | OUTPATIENT
Start: 2023-10-25 | End: 2023-10-25

## 2023-10-25 RX ADMIN — IOPAMIDOL 90 ML: 755 INJECTION, SOLUTION INTRAVENOUS at 09:35

## 2023-10-26 DIAGNOSIS — C18.1 MALIGNANT NEOPLASM OF APPENDIX (H): Primary | ICD-10-CM

## 2023-10-30 PROCEDURE — 88342 IMHCHEM/IMCYTCHM 1ST ANTB: CPT | Mod: 26 | Performed by: PATHOLOGY

## 2023-10-30 PROCEDURE — 88342 IMHCHEM/IMCYTCHM 1ST ANTB: CPT | Mod: TC | Performed by: SURGERY

## 2023-10-30 PROCEDURE — 88341 IMHCHEM/IMCYTCHM EA ADD ANTB: CPT | Mod: 26 | Performed by: PATHOLOGY

## 2023-11-01 LAB
PATH REPORT.ADDENDUM SPEC: ABNORMAL
PATH REPORT.COMMENTS IMP SPEC: ABNORMAL
PATH REPORT.COMMENTS IMP SPEC: YES
PATH REPORT.FINAL DX SPEC: ABNORMAL
PATH REPORT.GROSS SPEC: ABNORMAL
PATH REPORT.MICROSCOPIC SPEC OTHER STN: ABNORMAL
PATH REPORT.RELEVANT HX SPEC: ABNORMAL
PATH REPORT.RELEVANT HX SPEC: ABNORMAL

## 2023-11-02 NOTE — TELEPHONE ENCOUNTER
I talked with Kiko this morning and offered a date of 11.06.23 for the colonoscopy. He stated he needs to work on a couple of things and will call me back. I made sure I gave him my direct telephone number. I will await is return call.

## 2023-11-03 ENCOUNTER — HOSPITAL ENCOUNTER (OUTPATIENT)
Facility: AMBULATORY SURGERY CENTER | Age: 63
End: 2023-11-03
Attending: SURGERY
Payer: COMMERCIAL

## 2023-11-03 ENCOUNTER — TELEPHONE (OUTPATIENT)
Dept: SURGERY | Facility: CLINIC | Age: 63
End: 2023-11-03
Payer: COMMERCIAL

## 2023-11-03 DIAGNOSIS — C18.1 MALIGNANT NEOPLASM OF APPENDIX (H): Primary | ICD-10-CM

## 2023-11-03 PROCEDURE — 99207 PR NO BILLABLE SERVICE THIS VISIT: CPT | Performed by: SURGERY

## 2023-11-03 NOTE — TELEPHONE ENCOUNTER
Kiko returned my call and he's now scheduled for the colonoscopy on 11.07.23. We went over details and I let him know I will send the confirmation letter/colonoscopy prep instructions to his [facility]. He's in agreement with the plan.

## 2023-11-03 NOTE — TELEPHONE ENCOUNTER
Called with results of MDTB and path review    Plan for colonoscopy next week    RTC the following week for final surgical planning/discussion    Estrada Garcia MD

## 2023-11-07 ENCOUNTER — TELEPHONE (OUTPATIENT)
Dept: SURGERY | Facility: CLINIC | Age: 63
End: 2023-11-07
Payer: COMMERCIAL

## 2023-11-07 NOTE — TELEPHONE ENCOUNTER
Patient has a follow up appt with Dr Garcia on 11/14 to discuss pathology results and surgical plan. He had a colonoscopy scheduled but it has been canceled because he got c-diff that was diagnosed by his PCP. He did a stool test. He would like to know if Dr Garcia still wants to see him to discuss his diagnosis. Please call patient back at [phone]

## 2023-11-07 NOTE — TELEPHONE ENCOUNTER
I called Kiko to confirm keeping his scheduled appointment next week per Dr. Garcia. He is in agreement to this plan.

## 2023-11-08 ENCOUNTER — TELEPHONE (OUTPATIENT)
Dept: SURGERY | Facility: CLINIC | Age: 63
End: 2023-11-08
Payer: COMMERCIAL

## 2023-11-08 NOTE — TELEPHONE ENCOUNTER
I spoke with Kiko this afternoon. We were waiting to find out when we can reschedule his colonoscopy due to cancelling for 11.07.23 due to a c-diff diagnosis. He's been on antibiotics since 11.03.23. I let him know, per [facility] protocol, he needs to be finished with his antibiotics and then see his PCP to make sure he's cleared to move forward with the colonoscopy. I relayed this information to Kiko. He's in agreement with the plan. He's going to call me once he's able to reschedule.

## 2023-11-14 ENCOUNTER — OFFICE VISIT (OUTPATIENT)
Dept: SURGERY | Facility: CLINIC | Age: 63
End: 2023-11-14
Payer: COMMERCIAL

## 2023-11-14 VITALS
WEIGHT: 149.9 LBS | SYSTOLIC BLOOD PRESSURE: 116 MMHG | HEIGHT: 65 IN | DIASTOLIC BLOOD PRESSURE: 70 MMHG | BODY MASS INDEX: 24.97 KG/M2

## 2023-11-14 DIAGNOSIS — C18.1 ADENOCARCINOMA OF APPENDIX (H): Primary | ICD-10-CM

## 2023-11-14 PROCEDURE — 99214 OFFICE O/P EST MOD 30 MIN: CPT | Performed by: SURGERY

## 2023-11-14 RX ORDER — METRONIDAZOLE 500 MG/1
500 TABLET ORAL
COMMUNITY
Start: 2023-11-03

## 2023-11-14 RX ORDER — BUPROPION HYDROCHLORIDE 200 MG/1
TABLET, EXTENDED RELEASE ORAL
COMMUNITY
Start: 2023-11-07

## 2023-11-14 NOTE — PROGRESS NOTES
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    10/30/23: Path review    Adenocarcinoma, mucinous, moderately differentiated:   -Arising from serrated polyp with high grade cytologic dysplasia   -Tumor invades through appendix wall to periappendiceal adipose tissue   -Proximal resection margin positive for serrated polyp but not invasive carcinoma   -No involvement of presumed radial/mesoappendiceal margin (See Comment)   -No lymph nodes identified; also no apparent lymphovascular invasions   -AJCC/TNM stage: pT3  (See Comment)  Underlying acute appendicitis with apical disruption  (See Comment)    Colonoscopy cancelled due to c diff     HPI:    Since his last visit, he has developed c diff colitis.  ~5 loose BM/day.  No f/c.  Does have some associated cramping.    /70   Ht 1.651 m (5' 5\")   Wt 68 kg (149 lb 14.4 oz)   BMI 24.94 kg/m       PE:  Abdomen soft, nt/nd    ASSESSMENT:    Kiko Tovar is a 63 year old male with pT3Nx mucinous appendiceal adenocarcinoma.    PLAN:    Today we discussed the treatment plan moving forward, now with path reviewed and staging completed, aside from colonoscopy.  He currently has no evidence of extra or intra-peritoneal metastases.  Tumor markers are wnl.  We unfortunately have not been able to obtain a colonoscopy due to recent c diff diagnosis.  Given his history of HOCM, he would ideally undergo only one general anesthetic.  As such, I would recommend a diagnostic laparoscopy with laparoscopic right hemicolectomy planned under " the same anesthetic.  If I were to encounter widespread peritoneal disease, I would close and meet with him to discuss the extent of cytoreduction needed.  If small volume disease I would plan for cytoreduction and right hemicolectomy to follow laparoscopy.  He currently has a plan to meet for a second opinion this week.  He will call with his plans to keep care here or transfer care.  I will work on colonoscopy depending on that call.  He is also completing his course of antibiotics for c diff, if he is having issues getting this cleared he will also reach out to clinic.    Estrada Garcia MD

## 2023-11-14 NOTE — LETTER
"    11/14/2023         RE: Kiko Tovar  [address] [place] [id number]        Dear Colleague,    Thank you for referring your patient, Kiko Tovar, to the [facility] [place]. Please see a copy of my visit note below.    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    10/30/23: Path review    Adenocarcinoma, mucinous, moderately differentiated:   -Arising from serrated polyp with high grade cytologic dysplasia   -Tumor invades through appendix wall to periappendiceal adipose tissue   -Proximal resection margin positive for serrated polyp but not invasive carcinoma   -No involvement of presumed radial/mesoappendiceal margin (See Comment)   -No lymph nodes identified; also no apparent lymphovascular invasions   -AJCC/TNM stage: pT3  (See Comment)  Underlying acute appendicitis with apical disruption  (See Comment)    Colonoscopy cancelled due to c diff     HPI:    Since his last visit, he has developed c diff colitis.  ~5 loose BM/day.  No f/c.  Does have some associated cramping.    /70   Ht 1.651 m (5' 5\")   Wt 68 kg (149 lb 14.4 oz)   BMI 24.94 kg/m       PE:  Abdomen soft, nt/nd    ASSESSMENT:    Kiko Tovar is a 63 year old male with pT3Nx mucinous appendiceal adenocarcinoma.    PLAN:    Today we discussed the treatment plan moving forward, now with path reviewed and staging completed, aside from colonoscopy.  He currently has no evidence of extra or intra-peritoneal metastases.  Tumor markers are wnl.  " We unfortunately have not been able to obtain a colonoscopy due to recent c diff diagnosis.  Given his history of HOCM, he would ideally undergo only one general anesthetic.  As such, I would recommend a diagnostic laparoscopy with laparoscopic right hemicolectomy planned under the same anesthetic.  If I were to encounter widespread peritoneal disease, I would close and meet with him to discuss the extent of cytoreduction needed.  If small volume disease I would plan for cytoreduction and right hemicolectomy to follow laparoscopy.  He currently has a plan to meet for a second opinion this week.  He will call with his plans to keep care here or transfer care.  I will work on colonoscopy depending on that call.  He is also completing his course of antibiotics for c diff, if he is having issues getting this cleared he will also reach out to clinic.    Estrada Garcia MD       Again, thank you for allowing me to participate in the care of your patient.        Sincerely,        Estrada Garcia MD

## 2023-12-05 LAB
PATH REPORT.ADDENDUM SPEC: ABNORMAL
PATH REPORT.COMMENTS IMP SPEC: ABNORMAL
PATH REPORT.COMMENTS IMP SPEC: YES
PATH REPORT.FINAL DX SPEC: ABNORMAL
PATH REPORT.GROSS SPEC: ABNORMAL
PATH REPORT.MICROSCOPIC SPEC OTHER STN: ABNORMAL
PATH REPORT.RELEVANT HX SPEC: ABNORMAL
PATHOLOGY SYNOPTIC REPORT: ABNORMAL
PHOTO IMAGE: ABNORMAL

## 2023-12-10 ENCOUNTER — HEALTH MAINTENANCE LETTER (OUTPATIENT)
Age: 63
End: 2023-12-10

## 2024-07-07 ENCOUNTER — HEALTH MAINTENANCE LETTER (OUTPATIENT)
Age: 64
End: 2024-07-07

## 2024-10-01 ENCOUNTER — HOSPITAL ENCOUNTER (OUTPATIENT)
Dept: BONE DENSITY | Facility: HOSPITAL | Age: 64
Discharge: HOME | End: 2024-10-01
Admitting: INTERNAL MEDICINE
Payer: COMMERCIAL

## 2024-10-01 DIAGNOSIS — M25.569 KNEE PAIN: ICD-10-CM

## 2024-10-01 DIAGNOSIS — M25.519 SHOULDER PAIN: ICD-10-CM

## 2024-10-01 PROCEDURE — 77080 DXA BONE DENSITY AXIAL: CPT

## 2024-10-11 ENCOUNTER — LAB REQUISITION (OUTPATIENT)
Dept: LAB | Facility: HOSPITAL | Age: 64
End: 2024-10-11
Payer: COMMERCIAL

## 2024-10-11 DIAGNOSIS — M85.9 DISORDER OF BONE DENSITY AND STRUCTURE, UNSPECIFIED: ICD-10-CM

## 2024-10-11 LAB
ALBUMIN SERPL BCG-MCNC: 4.1 G/DL (ref 3.5–5.2)
ALP SERPL-CCNC: 109 U/L (ref 40–150)
ALT SERPL W P-5'-P-CCNC: 37 U/L (ref 0–70)
ANION GAP SERPL CALCULATED.3IONS-SCNC: 8 MMOL/L (ref 7–15)
AST SERPL W P-5'-P-CCNC: 34 U/L (ref 0–45)
BASOPHILS # BLD AUTO: 0 10E3/UL (ref 0–0.2)
BASOPHILS NFR BLD AUTO: 1 %
BILIRUB SERPL-MCNC: 0.4 MG/DL
BUN SERPL-MCNC: 25.8 MG/DL (ref 8–23)
CALCIUM SERPL-MCNC: 9.3 MG/DL (ref 8.8–10.4)
CHLORIDE SERPL-SCNC: 103 MMOL/L (ref 98–107)
CREAT SERPL-MCNC: 1.6 MG/DL (ref 0.67–1.17)
CRP SERPL-MCNC: <3 MG/L
EGFRCR SERPLBLD CKD-EPI 2021: 48 ML/MIN/1.73M2
EOSINOPHIL # BLD AUTO: 0.4 10E3/UL (ref 0–0.7)
EOSINOPHIL NFR BLD AUTO: 7 %
ERYTHROCYTE [DISTWIDTH] IN BLOOD BY AUTOMATED COUNT: 12.7 % (ref 10–15)
ERYTHROCYTE [SEDIMENTATION RATE] IN BLOOD BY WESTERGREN METHOD: 9 MM/HR (ref 0–20)
GLUCOSE SERPL-MCNC: 87 MG/DL (ref 70–99)
HCO3 SERPL-SCNC: 28 MMOL/L (ref 22–29)
HCT VFR BLD AUTO: 42.5 % (ref 40–53)
HGB BLD-MCNC: 13.9 G/DL (ref 13.3–17.7)
IMM GRANULOCYTES # BLD: 0 10E3/UL
IMM GRANULOCYTES NFR BLD: 0 %
LYMPHOCYTES # BLD AUTO: 0.9 10E3/UL (ref 0.8–5.3)
LYMPHOCYTES NFR BLD AUTO: 17 %
MAGNESIUM SERPL-MCNC: 2.2 MG/DL (ref 1.7–2.3)
MCH RBC QN AUTO: 30.1 PG (ref 26.5–33)
MCHC RBC AUTO-ENTMCNC: 32.7 G/DL (ref 31.5–36.5)
MCV RBC AUTO: 92 FL (ref 78–100)
MONOCYTES # BLD AUTO: 0.7 10E3/UL (ref 0–1.3)
MONOCYTES NFR BLD AUTO: 14 %
NEUTROPHILS # BLD AUTO: 3.2 10E3/UL (ref 1.6–8.3)
NEUTROPHILS NFR BLD AUTO: 62 %
NRBC # BLD AUTO: 0 10E3/UL
NRBC BLD AUTO-RTO: 0 /100
PHOSPHATE SERPL-MCNC: 3.5 MG/DL (ref 2.5–4.5)
PLATELET # BLD AUTO: 216 10E3/UL (ref 150–450)
POTASSIUM SERPL-SCNC: 4.8 MMOL/L (ref 3.4–5.3)
PROT SERPL-MCNC: 7.1 G/DL (ref 6.4–8.3)
RBC # BLD AUTO: 4.62 10E6/UL (ref 4.4–5.9)
SODIUM SERPL-SCNC: 139 MMOL/L (ref 135–145)
WBC # BLD AUTO: 5.2 10E3/UL (ref 4–11)

## 2024-10-11 PROCEDURE — 86140 C-REACTIVE PROTEIN: CPT | Mod: ORL | Performed by: INTERNAL MEDICINE

## 2024-10-11 PROCEDURE — 86038 ANTINUCLEAR ANTIBODIES: CPT | Mod: ORL | Performed by: INTERNAL MEDICINE

## 2024-10-11 PROCEDURE — 83970 ASSAY OF PARATHORMONE: CPT | Mod: ORL | Performed by: INTERNAL MEDICINE

## 2024-10-11 PROCEDURE — 82306 VITAMIN D 25 HYDROXY: CPT | Mod: ORL | Performed by: INTERNAL MEDICINE

## 2024-10-11 PROCEDURE — 84100 ASSAY OF PHOSPHORUS: CPT | Mod: ORL | Performed by: INTERNAL MEDICINE

## 2024-10-11 PROCEDURE — 80053 COMPREHEN METABOLIC PANEL: CPT | Mod: ORL | Performed by: INTERNAL MEDICINE

## 2024-10-11 PROCEDURE — 36415 COLL VENOUS BLD VENIPUNCTURE: CPT | Mod: ORL | Performed by: INTERNAL MEDICINE

## 2024-10-11 PROCEDURE — 85025 COMPLETE CBC W/AUTO DIFF WBC: CPT | Mod: ORL | Performed by: INTERNAL MEDICINE

## 2024-10-11 PROCEDURE — 83735 ASSAY OF MAGNESIUM: CPT | Mod: ORL | Performed by: INTERNAL MEDICINE

## 2024-10-11 PROCEDURE — 85652 RBC SED RATE AUTOMATED: CPT | Mod: ORL | Performed by: INTERNAL MEDICINE

## 2024-10-12 LAB
PTH-INTACT SERPL-MCNC: 66 PG/ML (ref 15–65)
VIT D+METAB SERPL-MCNC: 53 NG/ML (ref 20–50)

## 2024-10-14 LAB — ANA SER QL IF: NEGATIVE

## 2024-10-15 ENCOUNTER — LAB REQUISITION (OUTPATIENT)
Dept: LAB | Facility: HOSPITAL | Age: 64
End: 2024-10-15
Payer: COMMERCIAL

## 2024-10-15 LAB
CALCIUM 24H UR-MRATE: 0.08 G/SPEC (ref 0.1–0.3)
CALCIUM UR-MCNC: 3.5 MG/DL
COLLECT DURATION TIME UR: 24 H
COLLECT DURATION TIME UR: 24 H
CREAT 24H UR-MRATE: 1.37 G/SPEC (ref 0.98–2.2)
CREAT UR-MCNC: 56.5 MG/DL
SPECIMEN VOL UR: 2425 ML
SPECIMEN VOL UR: 2425 ML

## 2024-10-15 PROCEDURE — 82340 ASSAY OF CALCIUM IN URINE: CPT | Mod: ORL | Performed by: INTERNAL MEDICINE

## 2024-10-15 PROCEDURE — 82570 ASSAY OF URINE CREATININE: CPT | Mod: ORL | Performed by: INTERNAL MEDICINE

## 2024-12-03 LAB
ALBUMIN UR-MCNC: NEGATIVE MG/DL
APPEARANCE UR: CLEAR
BILIRUB UR QL STRIP: NEGATIVE
COLOR UR AUTO: COLORLESS
GLUCOSE UR STRIP-MCNC: NEGATIVE MG/DL
HGB UR QL STRIP: NEGATIVE
KETONES UR STRIP-MCNC: NEGATIVE MG/DL
LEUKOCYTE ESTERASE UR QL STRIP: NEGATIVE
NITRATE UR QL: NEGATIVE
PH UR STRIP: 5.5 [PH] (ref 5–7)
RBC URINE: <1 /HPF
SP GR UR STRIP: 1.01 (ref 1–1.03)
UROBILINOGEN UR STRIP-MCNC: <2 MG/DL
WBC URINE: 0 /HPF

## 2024-12-03 PROCEDURE — 85018 HEMOGLOBIN: CPT | Performed by: EMERGENCY MEDICINE

## 2024-12-03 PROCEDURE — 36415 COLL VENOUS BLD VENIPUNCTURE: CPT | Performed by: EMERGENCY MEDICINE

## 2024-12-03 PROCEDURE — 83690 ASSAY OF LIPASE: CPT | Performed by: EMERGENCY MEDICINE

## 2024-12-03 PROCEDURE — 99284 EMERGENCY DEPT VISIT MOD MDM: CPT | Mod: 25

## 2024-12-03 PROCEDURE — 81001 URINALYSIS AUTO W/SCOPE: CPT | Performed by: EMERGENCY MEDICINE

## 2024-12-03 PROCEDURE — 85049 AUTOMATED PLATELET COUNT: CPT | Performed by: EMERGENCY MEDICINE

## 2024-12-03 PROCEDURE — 80053 COMPREHEN METABOLIC PANEL: CPT | Performed by: EMERGENCY MEDICINE

## 2024-12-03 PROCEDURE — 81001 URINALYSIS AUTO W/SCOPE: CPT | Performed by: STUDENT IN AN ORGANIZED HEALTH CARE EDUCATION/TRAINING PROGRAM

## 2024-12-03 PROCEDURE — 82040 ASSAY OF SERUM ALBUMIN: CPT | Performed by: EMERGENCY MEDICINE

## 2024-12-03 ASSESSMENT — COLUMBIA-SUICIDE SEVERITY RATING SCALE - C-SSRS
6. HAVE YOU EVER DONE ANYTHING, STARTED TO DO ANYTHING, OR PREPARED TO DO ANYTHING TO END YOUR LIFE?: NO
2. HAVE YOU ACTUALLY HAD ANY THOUGHTS OF KILLING YOURSELF IN THE PAST MONTH?: NO
1. IN THE PAST MONTH, HAVE YOU WISHED YOU WERE DEAD OR WISHED YOU COULD GO TO SLEEP AND NOT WAKE UP?: NO

## 2024-12-04 ENCOUNTER — APPOINTMENT (OUTPATIENT)
Dept: ULTRASOUND IMAGING | Facility: HOSPITAL | Age: 64
End: 2024-12-04
Attending: EMERGENCY MEDICINE
Payer: COMMERCIAL

## 2024-12-04 ENCOUNTER — HOSPITAL ENCOUNTER (EMERGENCY)
Facility: HOSPITAL | Age: 64
Discharge: HOME | End: 2024-12-04
Attending: EMERGENCY MEDICINE
Payer: COMMERCIAL

## 2024-12-04 ENCOUNTER — APPOINTMENT (OUTPATIENT)
Dept: CT IMAGING | Facility: HOSPITAL | Age: 64
End: 2024-12-04
Attending: EMERGENCY MEDICINE
Payer: COMMERCIAL

## 2024-12-04 VITALS
HEART RATE: 58 BPM | RESPIRATION RATE: 18 BRPM | SYSTOLIC BLOOD PRESSURE: 163 MMHG | DIASTOLIC BLOOD PRESSURE: 77 MMHG | WEIGHT: 145.6 LBS | HEIGHT: 65 IN | BODY MASS INDEX: 24.26 KG/M2 | OXYGEN SATURATION: 98 % | TEMPERATURE: 97.8 F

## 2024-12-04 DIAGNOSIS — K82.8 THICKENING OF WALL OF GALLBLADDER: ICD-10-CM

## 2024-12-04 DIAGNOSIS — R10.10 UPPER ABDOMINAL PAIN: ICD-10-CM

## 2024-12-04 LAB
ALBUMIN SERPL BCG-MCNC: 4.2 G/DL (ref 3.5–5.2)
ALP SERPL-CCNC: 104 U/L (ref 40–150)
ALT SERPL W P-5'-P-CCNC: 34 U/L (ref 0–70)
ANION GAP SERPL CALCULATED.3IONS-SCNC: 9 MMOL/L (ref 7–15)
AST SERPL W P-5'-P-CCNC: 34 U/L (ref 0–45)
BILIRUB SERPL-MCNC: 0.3 MG/DL
BUN SERPL-MCNC: 25.7 MG/DL (ref 8–23)
CALCIUM SERPL-MCNC: 9.1 MG/DL (ref 8.8–10.4)
CHLORIDE SERPL-SCNC: 105 MMOL/L (ref 98–107)
CREAT SERPL-MCNC: 1.38 MG/DL (ref 0.67–1.17)
EGFRCR SERPLBLD CKD-EPI 2021: 57 ML/MIN/1.73M2
ERYTHROCYTE [DISTWIDTH] IN BLOOD BY AUTOMATED COUNT: 12.4 % (ref 10–15)
GLUCOSE SERPL-MCNC: 89 MG/DL (ref 70–99)
HCO3 SERPL-SCNC: 26 MMOL/L (ref 22–29)
HCT VFR BLD AUTO: 41.4 % (ref 40–53)
HGB BLD-MCNC: 13.6 G/DL (ref 13.3–17.7)
LIPASE SERPL-CCNC: 65 U/L (ref 13–60)
MCH RBC QN AUTO: 30.5 PG (ref 26.5–33)
MCHC RBC AUTO-ENTMCNC: 32.9 G/DL (ref 31.5–36.5)
MCV RBC AUTO: 93 FL (ref 78–100)
PLATELET # BLD AUTO: 233 10E3/UL (ref 150–450)
POTASSIUM SERPL-SCNC: 4.6 MMOL/L (ref 3.4–5.3)
PROT SERPL-MCNC: 7.1 G/DL (ref 6.4–8.3)
RBC # BLD AUTO: 4.46 10E6/UL (ref 4.4–5.9)
SODIUM SERPL-SCNC: 140 MMOL/L (ref 135–145)
WBC # BLD AUTO: 6.5 10E3/UL (ref 4–11)

## 2024-12-04 PROCEDURE — 76705 ECHO EXAM OF ABDOMEN: CPT

## 2024-12-04 PROCEDURE — 74176 CT ABD & PELVIS W/O CONTRAST: CPT

## 2024-12-04 RX ORDER — IOPAMIDOL 755 MG/ML
71 INJECTION, SOLUTION INTRAVASCULAR ONCE
Status: COMPLETED | OUTPATIENT
Start: 2024-12-04 | End: 2024-12-04

## 2024-12-04 ASSESSMENT — ACTIVITIES OF DAILY LIVING (ADL)
ADLS_ACUITY_SCORE: 46
ADLS_ACUITY_SCORE: 46

## 2024-12-04 NOTE — ED PROVIDER NOTES
EMERGENCY DEPARTMENT ENCOUNTER      NAME: Kiko Tovar  AGE: 64 year old male  YOB: 1960  EVALUATION DATE & TIME: No admission date for patient encounter.    ED PROVIDER: Liz Michelle MD    Chief Complaint   Patient presents with    Abdominal Pain    Flank Pain       FINAL IMPRESSION  1. Upper abdominal pain    2. Thickening of wall of gallbladder        MEDICAL DECISION MAKING   Kiko Tovar is a 64 year old male who presents for evaluation of abdominal and flank pain.  Records reviewed.  Patient has a history of hypertension, hyperlipidemia, appendiceal cancer, status post colectomy.  He follows with oncology at the [facility].  Also follows with cardiology at [facility].  Most recent echo on 9/19/2024 with EF of 60%.  Today, patient presents with complaints of upper abdominal pain that began this afternoon and has been waxing and waning.  He has had some looser bowel movements but no diarrhea, melena, bloody stools.  Also denies associated fever, nausea, vomiting, urinary symptoms.  No history of acid reflux, kidney stones, kidney infections.    Considered broad differential COVID not limited to hepatobiliary disease, pancreatitis, gastritis, gastroenteritis, peptic ulcer disease, GERD, obstruction, perforation, progression of malignancy, cystitis, pyonephritis.  Lower suspicion for AAA/dissection or atypical ACS.  Discussed options for workup and management with patient.  We have agreed on plan for labs, UA, CT abdomen/pelvis.  I did explain to the patient that if CT shows evidence of pathology on the gallbladder, may also need to proceed with ultrasound.  Patient declined offer for analgesic/antiemetic but will update me if he changes his mind.    CMP with creatinine of 1.38, appears actually improved from baseline most recent.  No other electrolyte derangement, acidosis, or evidence of hepatobiliary disease.  Lipase minimally elevated 65, does not meet criteria for acute pancreatitis.  CBC  reassuring. No evidence of leukocytosis to suggest systemic infectious/inflammatory process. No acute anemia. PLTs wnl. UA without evidence of infection. No hematuria to suggest nephrolithiasis/ureterolithiasis.  CT abdomen/pelvis showed gallbladder distention with mild haziness and pericholecystic fat and free fluid in the area of the gallbladder liver.  No other acute intra-abdominal process to explain symptoms.    I updated patient and his wife with results of CT scan and recommended that we proceed with ultrasound of the right upper quadrant to get a better evaluation of the gallbladder area.  He remained comfortable here continues to decline offer for analgesic.    Ultrasound gallbladder showed borderline wall thickening/irregularity with small volume perihepatic free fluid.  With this, I do have some concern for gallbladder etiology of symptoms and believe patient would meet criteria and potentially benefit from a HIDA scan and/or EGD/more evaluation by GI team.  I rechecked the patient and updated he and his wife again with these results.  He reports that he is eventually feeling back to his normal and we had a relatively long discussion about options for further workup and management.  I offered admission for further testing and we also discussed my speaking with [facility].  He reports that he actually has an appointment with the GI team at [facility] in a couple of weeks and would prefer to stay in the system.  Given he is now feeling back to normal without any interventions here and has an otherwise reassuring workup, is tolerating p.o., and ambulatory, I do believe this is reasonable.  For now, we agreed on plan for continued conservative management.  I recommended that he reach out to his GI team at [facility] today to let them know that he was seen here and see if they might be able to expedite his appointment.  Also placed a referral for him to be seen by [facility] if this helps expedite workup.  He states  he will return here if he has any worsening symptoms.     At the end of the encounter, we reviewed the results, potential diagnoses, as well as return precautions and recommendations for follow up. I instructed Mr. Tovar to return to the emergency department immediately if he develops any new or worsening symptoms and provided additional verbal discharge instructions. Mr. Tovar expressed understanding and agreement with this plan of care, his questions were answered, and he was discharged in stable condition.      Considerations in Medical Decision Making  History:  Obtained supplemental history: Supplemental history obtained?: Documented in chart and Family Member/Significant Other  Reviewed external records: External records reviewed?: Documented in chart  Care impacted by chronic illness: Chronic Kidney Disease, cancer, GERD, HTN, HLD    Work Up:  In additional to work up documented, I considered the following work up: Documented in chart, if applicable.  Chart documentation includes differential considered and any EKGs or imaging independently interpreted by provider, where specified.    External consultation:  Discussion of management with another provider: Documented in chart, if applicable    Disposition considerations: Discharge. I discussed a prescription for PPI, zofran, but deferred after shared decision making discussion.. I recommended admission, but the patient declined.    MIPS Documentation: Not Applicable       ED COURSE  12:08 AM I met with the patient, obtained a history, and performed my initial exam.  3:08 AM I updated the patient on imaging and laboratory results.   4:59 AM I rechecked the patient.  5:02 AM I rechecked the patient.       MEDICATIONS GIVEN IN THE ED  Medications   sodium chloride 0.9% BOLUS 1,000 mL (1,000 mLs Intravenous Not Given 12/4/24 0317)   iopamidol (ISOVUE-370) solution 71 mL (71 mLs Intravenous Not Given 12/4/24 0257)       NEW PRESCRIPTIONS STARTED AT TODAY'S  "VISIT  Discharge Medication List as of 12/4/2024  5:21 AM             =================================================================    HPI:    Use of : N/A      Kiko Tovar is a 64 year old male who presents with abdominal and flank pain.     The patient reports he has been having upper abdominal and left sided abdominal pain that radiates to his back that started this afternoon. He endorses pressure increases the pain. He states he had a looser bowel movement than normal yesterday. He endorses at baseline he has multiple bowel movements per day and is unsure if he has had one today. He notes having an appendectomy about 1 year ago and it was found he had appendiceal cancer. He endorses having 22 cm of his large intestine and 11 cm of his small intestine removed. He denies having a history of kidney stones, new foods, recent travel, nausea, vomiting, urinary symptoms, difficulty breathing, or chest pain.      RELEVANT HISTORY, MEDICATIONS, & ALLERGIES   Past medical history, surgical history, family history, medications, and allergies reviewed and pertinent noted in HPI.    REVIEW OF SYSTEMS:  A complete review of systems was performed with pertinent positives and negatives noted in the HPI.     PHYSICAL EXAM:    Vitals: BP (!) 163/77   Pulse 58   Temp 97.8  F (36.6  C) (Oral)   Resp 18   Ht 1.651 m (5' 5\")   Wt 66 kg (145 lb 9.6 oz)   SpO2 98%   BMI 24.23 kg/m     General: Alert and interactive, comfortable appearing.  HENT: Atraumatic. Full AROM of neck. MMM.  Cardiovascular: Regular rate and rhythm.   Chest/Pulmonary: Normal work of breathing. Speaking in complete sentences. Lungs CTAB. No chest wall tenderness or deformities.  Abdomen: Soft, nondistended. Mild TTP epigastric and RUQ without guarding or rebound. No CVA tenderness.   Extremities: Normal AROM of all major joints.  Skin: Warm and dry. Normal skin color.   Neuro: Speech clear. CNs grossly intact. Moves all extremities " spontaneously.   Psych: Normal affect/mood, cooperative, memory appropriate.      LAB  Labs Ordered and Resulted from Time of ED Arrival to Time of ED Departure   COMPREHENSIVE METABOLIC PANEL - Abnormal       Result Value    Sodium 140      Potassium 4.6      Carbon Dioxide (CO2) 26      Anion Gap 9      Urea Nitrogen 25.7 (*)     Creatinine 1.38 (*)     GFR Estimate 57 (*)     Calcium 9.1      Chloride 105      Glucose 89      Alkaline Phosphatase 104      AST 34      ALT 34      Protein Total 7.1      Albumin 4.2      Bilirubin Total 0.3     LIPASE - Abnormal    Lipase 65 (*)    ROUTINE UA WITH MICROSCOPIC REFLEX TO CULTURE - Normal    Color Urine Colorless      Appearance Urine Clear      Glucose Urine Negative      Bilirubin Urine Negative      Ketones Urine Negative      Specific Gravity Urine 1.010      Blood Urine Negative      pH Urine 5.5      Protein Albumin Urine Negative      Urobilinogen Urine <2.0      Nitrite Urine Negative      Leukocyte Esterase Urine Negative      RBC Urine <1      WBC Urine 0     CBC WITH PLATELETS - Normal    WBC Count 6.5      RBC Count 4.46      Hemoglobin 13.6      Hematocrit 41.4      MCV 93      MCH 30.5      MCHC 32.9      RDW 12.4      Platelet Count 233         RADIOLOGY  US Abdomen Limited   Final Result   IMPRESSION:   1.  No cholelithiasis, but the gallbladder is distended with irregularly thickened wall and there is small volume perihepatic free fluid. These findings are equivocal for cholecystitis and depending on level of clinical suspicion, nuclear medicine HIDA    scan may be warranted. The differential diagnosis includes acute acalculous cholecystitis, chronic cholecystitis, or gallbladder wall thickening from other causes such as fluid overload.            CT Abdomen Pelvis w/o Contrast   Final Result   IMPRESSION:    1. The gallbladder is borderline distended and there is mild haziness in the pericholecystic fat in addition to a tiny amount of perihepatic free  fluid and a very small amount of free fluid in the pelvis. These findings are not diagnostic of acute    cholecystitis, but acute cholecystitis is a possibility. If there is a clinical concern for acute cholecystitis, a HIDA scan or right upper quadrant ultrasound would be useful for further evaluation.   2. No other cause of acute pain identified in the abdomen or pelvis.             I, Luis Alberto Marquez, am serving as a scribe to document services personally performed by Dr. Liz Michelle based on my observation and the provider's statements to me. I, Liz Michelle MD attest that Luis Alberto Marquez is acting in a scribe capacity, has observed my performance of the services and has documented them in accordance with my direction.    Liz Michelle M.D.  Emergency Medicine  [facility] EMERGENCY DEPARTMENT  [address] [place] [id number]  [phone]  Dept: [phone]     Liz Michelle MD  12/04/24 0735

## 2024-12-04 NOTE — DISCHARGE INSTRUCTIONS
You were seen in the Emergency Department today for upper abdominal pain.    Like we talked about, the wall of your gallbladder looked a bit thick today and there was a bit of fluid around it.  It is possible that your symptoms are related to a problem with your gallbladder so I do think that is important you be seen by the GI doctors either here or at [facility].  It is also possible that your symptoms are related to an ulcer or inflammation of the wall of your stomach.  Since you have an appointment with the GI doctors at [facility] that is coming out, I recommend that you call them to see if they might want to move this sooner.  I did also put in referral for you to be seen by a GI doctor up here in case that helps facilitate things.    For now, I would recommend they continue to take all of your medications as prescribed, drink plenty of fluids, and eat foods that are gentle on your stomach.      Please return to the ER if you experience uncontrolled pain, fever, inability to keep fluids down, and/or for any other new or concerning symptoms, otherwise please follow up with your primary doctor and specialist at [facility] for recheck.     Thank you for choosing [facility]. It was a pleasure taking care of you today!  - Dr. Liz Michelle

## 2024-12-04 NOTE — ED TRIAGE NOTES
Pt arrives to triage with complaint upper L sided abdominal pain that began at noon today. Pt was seated in a chair when the pain began. Pain has worsened through the day. BM yesterday. Denies urinary symptoms. Hx CKD, colon resection.      Triage Assessment (Adult)       Row Name 12/03/24 9999          Triage Assessment    Airway WDL WDL        Respiratory WDL    Respiratory WDL WDL        Skin Circulation/Temperature WDL    Skin Circulation/Temperature WDL WDL        Cardiac WDL    Cardiac WDL WDL        Peripheral/Neurovascular WDL    Peripheral Neurovascular WDL WDL        Cognitive/Neuro/Behavioral WDL    Cognitive/Neuro/Behavioral WDL WDL

## 2025-06-01 ENCOUNTER — HEALTH MAINTENANCE LETTER (OUTPATIENT)
Age: 65
End: 2025-06-01

## (undated) DEVICE — SUCTION MANIFOLD NEPTUNE 2 SYS 1 PORT 702-025-000

## (undated) DEVICE — NDL INSUFFLATION 13GA 120MM C2201

## (undated) DEVICE — DECANTER VIAL 2006S

## (undated) DEVICE — CUSTOM PACK LAP CHOLE SBA5BLCHEA

## (undated) DEVICE — ENDO TROCAR FIRST ENTRY KII FIOS Z-THRD 12X100MM CTF73

## (undated) DEVICE — GLOVE BIOGEL PI INDICATOR 8.0 LF 41680

## (undated) DEVICE — SU DERMABOND ADVANCED .7ML DNX12

## (undated) DEVICE — ESU LIGASURE MARYLAND LAPAROSCOPIC SLR/DVDR 5MMX37CM LF1937

## (undated) DEVICE — SU VICRYL+ 0 27 UR6 VLT VCP603H

## (undated) DEVICE — ENDO TROCAR SLEEVE KII Z-THREADED 05X100MM CTS02

## (undated) DEVICE — BANDAGE ADH LF 1X3 ABN3100A

## (undated) DEVICE — DRSG STERI STRIP 1/2X4" R1547

## (undated) DEVICE — GLOVE BIOGEL PI ULTRATOUCH G SZ 7.5 42175

## (undated) DEVICE — Device

## (undated) DEVICE — SOL WATER IRRIG 1000ML BOTTLE 2F7114

## (undated) DEVICE — PREP CHLORAPREP 26ML TINTED HI-LITE ORANGE 930815

## (undated) DEVICE — ENDO POUCH UNIV RETRIEVAL SYSTEM INZII 10MM CD001

## (undated) DEVICE — TUBING SMOKE EVAC PNEUMOCLEAR HIGH FLOW 0620050250

## (undated) DEVICE — ENDO TROCAR FIRST ENTRY KII FIOS Z-THRD 05X100MM CTF03

## (undated) DEVICE — PLATE GROUNDING ADULT W/CORD 9165L

## (undated) DEVICE — SOL NACL 0.9% IRRIG 1000ML BOTTLE 2F7124

## (undated) DEVICE — SUTURE MONOCRYL+ 4-0 PS-2 27IN MCP426H

## (undated) DEVICE — STPL ENDO LINEAR CUT ARTICULATING 45MM ATS45

## (undated) DEVICE — STPL ENDO RELOAD 45X3.5MM 6R45B

## (undated) RX ORDER — FENTANYL CITRATE 50 UG/ML
INJECTION, SOLUTION INTRAMUSCULAR; INTRAVENOUS
Status: DISPENSED
Start: 2023-10-06

## (undated) RX ORDER — DEXAMETHASONE SODIUM PHOSPHATE 10 MG/ML
INJECTION, SOLUTION INTRAMUSCULAR; INTRAVENOUS
Status: DISPENSED
Start: 2023-10-06

## (undated) RX ORDER — BUPIVACAINE HYDROCHLORIDE 2.5 MG/ML
INJECTION, SOLUTION INFILTRATION; PERINEURAL
Status: DISPENSED
Start: 2023-10-06